# Patient Record
Sex: FEMALE | Race: WHITE | NOT HISPANIC OR LATINO | Employment: OTHER | URBAN - NONMETROPOLITAN AREA
[De-identification: names, ages, dates, MRNs, and addresses within clinical notes are randomized per-mention and may not be internally consistent; named-entity substitution may affect disease eponyms.]

---

## 2022-12-19 ENCOUNTER — DOCUMENTATION (OUTPATIENT)
Dept: FAMILY MEDICINE CLINIC | Facility: CLINIC | Age: 87
End: 2022-12-19

## 2022-12-19 PROBLEM — E83.42 HYPOMAGNESEMIA: Status: ACTIVE | Noted: 2022-12-19

## 2022-12-19 PROBLEM — F41.9 ANXIETY DISORDER: Status: ACTIVE | Noted: 2022-12-19

## 2022-12-19 PROBLEM — D51.0 VITAMIN B12 DEFICIENCY ANEMIA DUE TO INTRINSIC FACTOR DEFICIENCY: Status: ACTIVE | Noted: 2022-12-19

## 2022-12-19 PROBLEM — R14.3 FLATULENCE: Status: ACTIVE | Noted: 2022-12-19

## 2022-12-19 PROBLEM — E11.8 TYPE 2 DIABETES MELLITUS WITH UNSPECIFIED COMPLICATIONS: Status: ACTIVE | Noted: 2022-12-19

## 2022-12-19 PROBLEM — I10 ESSENTIAL (PRIMARY) HYPERTENSION: Status: ACTIVE | Noted: 2022-12-19

## 2022-12-19 PROBLEM — K59.00 CONSTIPATION, UNSPECIFIED: Status: ACTIVE | Noted: 2022-12-19

## 2022-12-19 PROBLEM — G47.00 INSOMNIA, UNSPECIFIED: Status: ACTIVE | Noted: 2022-12-19

## 2022-12-19 PROBLEM — J31.0 CHRONIC RHINITIS: Status: ACTIVE | Noted: 2022-12-19

## 2022-12-19 PROBLEM — E03.8 OTHER SPECIFIED HYPOTHYROIDISM: Status: ACTIVE | Noted: 2022-12-19

## 2022-12-19 PROBLEM — K26.9 DUODENAL ULCER, UNSP AS ACUTE OR CHRONIC, W/O HEMOR OR PERF: Status: ACTIVE | Noted: 2022-12-19

## 2022-12-19 PROBLEM — I21.A9 OTHER MYOCARDIAL INFARCTION TYPE: Status: ACTIVE | Noted: 2022-12-19

## 2022-12-19 PROBLEM — I48.91 UNSPECIFIED ATRIAL FIBRILLATION: Status: ACTIVE | Noted: 2022-12-19

## 2022-12-19 PROBLEM — I20.8 OTHER FORMS OF ANGINA PECTORIS: Status: ACTIVE | Noted: 2022-12-19

## 2022-12-19 PROBLEM — F32.0 MAJOR DEPRESSIVE DISORDER, SINGLE EPISODE, MILD: Status: ACTIVE | Noted: 2022-12-19

## 2022-12-19 PROBLEM — R52 PAIN, UNSPECIFIED: Status: ACTIVE | Noted: 2022-12-19

## 2022-12-19 PROBLEM — K21.9 GERD WITHOUT ESOPHAGITIS: Status: ACTIVE | Noted: 2022-12-19

## 2022-12-19 PROBLEM — E55.9 VITAMIN D DEFICIENCY, UNSPECIFIED: Status: ACTIVE | Noted: 2022-12-19

## 2022-12-19 PROBLEM — B37.9 CANDIDIASIS, UNSPECIFIED: Status: ACTIVE | Noted: 2022-12-19

## 2022-12-19 PROBLEM — J98.01 ACUTE BRONCHOSPASM: Status: ACTIVE | Noted: 2022-12-19

## 2022-12-19 PROBLEM — I20.89 OTHER FORMS OF ANGINA PECTORIS: Status: ACTIVE | Noted: 2022-12-19

## 2022-12-19 RX ORDER — LORATADINE 10 MG/1
10 TABLET ORAL DAILY
COMMUNITY

## 2022-12-19 RX ORDER — POLYETHYLENE GLYCOL 3350 17 G/17G
17 POWDER, FOR SOLUTION ORAL 2 TIMES DAILY
COMMUNITY

## 2022-12-19 RX ORDER — SIMETHICONE 80 MG
80 TABLET,CHEWABLE ORAL EVERY 6 HOURS PRN
COMMUNITY

## 2022-12-19 RX ORDER — LEVOTHYROXINE SODIUM 0.07 MG/1
75 TABLET ORAL DAILY
COMMUNITY

## 2022-12-19 RX ORDER — FLUTICASONE PROPIONATE 50 MCG
1 SPRAY, SUSPENSION (ML) NASAL DAILY
COMMUNITY

## 2022-12-19 RX ORDER — MIRTAZAPINE 15 MG/1
15 TABLET, ORALLY DISINTEGRATING ORAL NIGHTLY
COMMUNITY

## 2022-12-19 RX ORDER — IPRATROPIUM BROMIDE AND ALBUTEROL SULFATE 2.5; .5 MG/3ML; MG/3ML
3 SOLUTION RESPIRATORY (INHALATION) EVERY 6 HOURS PRN
COMMUNITY

## 2022-12-19 RX ORDER — ACETAMINOPHEN 325 MG/1
650 TABLET ORAL EVERY 4 HOURS PRN
COMMUNITY

## 2022-12-19 RX ORDER — NITROGLYCERIN 0.4 MG/1
0.4 TABLET SUBLINGUAL
COMMUNITY

## 2022-12-19 RX ORDER — MELATONIN
1000 DAILY
COMMUNITY

## 2022-12-19 RX ORDER — METOPROLOL SUCCINATE 50 MG/1
50 TABLET, EXTENDED RELEASE ORAL DAILY
COMMUNITY

## 2022-12-19 RX ORDER — HYDROCODONE BITARTRATE AND ACETAMINOPHEN 5; 325 MG/1; MG/1
1 TABLET ORAL EVERY 6 HOURS PRN
COMMUNITY
End: 2022-12-20 | Stop reason: SDUPTHER

## 2022-12-19 RX ORDER — DOCUSATE SODIUM 100 MG/1
100 CAPSULE, LIQUID FILLED ORAL 2 TIMES DAILY
COMMUNITY

## 2022-12-19 RX ORDER — NYSTATIN 100000 U/G
1 OINTMENT TOPICAL 3 TIMES DAILY
COMMUNITY

## 2022-12-19 RX ORDER — PANTOPRAZOLE SODIUM 40 MG/1
40 GRANULE, DELAYED RELEASE ORAL
COMMUNITY

## 2022-12-19 RX ORDER — CHOLECALCIFEROL (VITAMIN D3) 125 MCG
5 CAPSULE ORAL NIGHTLY
COMMUNITY

## 2022-12-19 RX ORDER — ISOSORBIDE MONONITRATE 30 MG/1
60 TABLET, EXTENDED RELEASE ORAL DAILY
COMMUNITY

## 2022-12-19 RX ORDER — CHLORDIAZEPOXIDE HYDROCHLORIDE 5 MG/1
5 CAPSULE, GELATIN COATED ORAL 2 TIMES DAILY
COMMUNITY
End: 2022-12-20 | Stop reason: SDUPTHER

## 2022-12-19 RX ORDER — LORAZEPAM 0.5 MG/1
0.5 TABLET ORAL 4 TIMES DAILY
COMMUNITY
End: 2022-12-20 | Stop reason: SDUPTHER

## 2022-12-19 RX ORDER — MAGNESIUM OXIDE 400 MG/1
400 TABLET ORAL 2 TIMES WEEKLY
COMMUNITY

## 2022-12-19 RX ORDER — INSULIN LISPRO 100 [IU]/ML
INJECTION, SOLUTION INTRAVENOUS; SUBCUTANEOUS
COMMUNITY

## 2022-12-19 RX ORDER — SUCRALFATE 1 G/1
1 TABLET ORAL 4 TIMES DAILY
COMMUNITY

## 2022-12-20 RX ORDER — LORAZEPAM 0.5 MG/1
0.5 TABLET ORAL EVERY 4 HOURS
Qty: 180 TABLET | Refills: 3 | Status: SHIPPED | OUTPATIENT
Start: 2022-12-20 | End: 2023-04-03 | Stop reason: SDUPTHER

## 2022-12-20 RX ORDER — CHLORDIAZEPOXIDE HYDROCHLORIDE 5 MG/1
5 CAPSULE, GELATIN COATED ORAL 2 TIMES DAILY
Qty: 60 CAPSULE | Refills: 3 | Status: SHIPPED | OUTPATIENT
Start: 2022-12-20

## 2022-12-20 RX ORDER — HYDROCODONE BITARTRATE AND ACETAMINOPHEN 5; 325 MG/1; MG/1
1 TABLET ORAL EVERY 6 HOURS PRN
Qty: 120 TABLET | Refills: 0 | Status: SHIPPED | OUTPATIENT
Start: 2022-12-20 | End: 2023-03-03 | Stop reason: SDUPTHER

## 2022-12-20 NOTE — TELEPHONE ENCOUNTER
(NEW ADMIT)    [facility] REQUESTING MED REFILLS FOR LORAZEPAM 0.5 MG, NORCO 5-325 MG AND CHLORIDIAZEPOXIDE 5 MG.    DIRECTIONS: LORAZEPAM 0.5 MG 1 TAB PO Q 4 HRS SCHEDULED.    NORCO 5-325 MG 1 TAB PO Q 6 HRS PRN.    CHLORDIAZEPOXIDE 5 MG T TAB PO BID SCHEDULED.

## 2022-12-21 ENCOUNTER — NURSING HOME (OUTPATIENT)
Dept: INTERNAL MEDICINE | Facility: CLINIC | Age: 87
End: 2022-12-21
Payer: MEDICARE

## 2022-12-21 VITALS
RESPIRATION RATE: 18 BRPM | TEMPERATURE: 98.3 F | DIASTOLIC BLOOD PRESSURE: 72 MMHG | HEART RATE: 64 BPM | OXYGEN SATURATION: 96 % | WEIGHT: 167.19 LBS | SYSTOLIC BLOOD PRESSURE: 140 MMHG

## 2022-12-21 DIAGNOSIS — I21.4 NON-STEMI (NON-ST ELEVATED MYOCARDIAL INFARCTION): Primary | ICD-10-CM

## 2022-12-21 DIAGNOSIS — E03.9 HYPOTHYROIDISM, UNSPECIFIED TYPE: ICD-10-CM

## 2022-12-21 DIAGNOSIS — F32.A ANXIETY AND DEPRESSION: ICD-10-CM

## 2022-12-21 DIAGNOSIS — I48.11 LONGSTANDING PERSISTENT ATRIAL FIBRILLATION: ICD-10-CM

## 2022-12-21 DIAGNOSIS — E11.65 TYPE 2 DIABETES MELLITUS WITH HYPERGLYCEMIA, UNSPECIFIED WHETHER LONG TERM INSULIN USE: ICD-10-CM

## 2022-12-21 DIAGNOSIS — I50.20 SYSTOLIC CONGESTIVE HEART FAILURE, UNSPECIFIED HF CHRONICITY: ICD-10-CM

## 2022-12-21 DIAGNOSIS — F41.9 ANXIETY AND DEPRESSION: ICD-10-CM

## 2022-12-21 DIAGNOSIS — I10 ESSENTIAL (PRIMARY) HYPERTENSION: ICD-10-CM

## 2022-12-21 PROCEDURE — 99305 1ST NF CARE MODERATE MDM 35: CPT | Performed by: INTERNAL MEDICINE

## 2022-12-21 NOTE — LETTER
NURSING HOME HISTORY AND PHYSICAL       Radha Hopper MD  [address] [place] [address]  Phone: [phone]  Fax: [phone]       PATIENT NAME: Breonna Gongora                                                                          YOB: 1935           DATE OF SERVICE: 12/21/2022  FACILITY:  [facility]  ______________________________________________________________________    CHIEF COMPLAINT:  Initial visit, status post non-STEMI    SUBJECTIVE     HISTORY OF PRESENT ILLNESS:   Mrs. Gongora is an 87 years old female with history of type 2 diabetes mellitus, essential hypertension, congestive heart failure, atrial fibrillation and hypothyroidism. She was recently hospitalized at our [facility] with a diagnosis of pneumonia and non-STEMI.  Cardiac catheterization revealed multivessel disease with severe plaquing, as further management options presented in the form of multivessel PCI versus shifting focus to comfort measures via hospice services, family elected to proceed with the latter.  Per available records, patient was subsequently transferred to swing bed placement where she received PT and OT while she enjoyed a stable clinical course.  However, she apparently was unable to participate in recommended therapy, therefore she was subsequently transferred to our facility for consideration of long-term placement.  Since transfer, patient remained in stable condition with no reported fever, chills or other acute systemic issues.  During my visit, patient was calm, comfortable and pleasantly denied complaints.    PAST MEDICAL & SURGICAL HISTORY:   Past Medical History:   Diagnosis Date   • Allergic    • Anemia    • CHF (congestive heart failure) (HCC)    • Depression    • Hypothyroidism    • Stroke (HCC)       Past Surgical History:   Procedure Laterality Date   • APPENDECTOMY     • CATARACT EXTRACTION N/A    • TOTAL ABDOMINAL HYSTERECTOMY WITH SALPINGO  OOPHORECTOMY           MEDICATIONS:  I have reviewed and reconciled the patients medication list in the patients chart at the skilled nursing facility today.      ALLERGIES:  Allergies   Allergen Reactions   • Penicillins Unknown - High Severity   • Sulfa Antibiotics Unknown - High Severity         SOCIAL HISTORY:  Social History     Socioeconomic History   • Marital status: Unknown   Tobacco Use   • Smoking status: Never   • Smokeless tobacco: Never   Substance and Sexual Activity   • Alcohol use: Never   • Drug use: Never   • Sexual activity: Defer       FAMILY HISTORY:  Family History   Problem Relation Age of Onset   • COPD Mother    • Heart disease Father    • Alcohol abuse Father        REVIEW OF SYSTEMS:  Fatigue, limited mobility, deconditioning  All other systems were reviewed and are negative.    OBJECTIVE     PHYSICAL EXAMINATION:   /72   Pulse 64   Temp 98.3 °F (36.8 °C)   Resp 18   Wt 75.8 kg (167 lb 3 oz)   SpO2 96%     Physical Exam    General Appearance:  Patient appears chronically ill, no signs of distress noted.   Lungs:   Breath sounds are equal bilaterally. No crackles or wheezing noted.   Heart:  Normal S1 and S2, no murmur, no gallop, no rub. No JVD.   Abdomen:   Soft, nontender, non distended, no guarding or rebound tenderness.  Bowel sounds are normal, no organomegaly.     Extremities: No edema, cyanosis or clubbing.     Musculoskeletal: Deconditioning noted.     Skin: No wounds or rash noted.     Neurologic:  Psychiatric:                           Grossly non focal.   Calm affect.       RECORDS REVIEW:   I have reviewed available medical records.  December 20, 2022: Glucose 195, sodium 137, potassium 4.4, BUN/creatinine 13/0.7, LFTs within normal, hemoglobin A1c 7.4, WBCs 9.9, H&H 10.2/31, platelets 213, TSH 1.94    ASSESSMENT AND PLAN     ASSESSMENT AND PLAN:  Diagnoses and all orders for this visit:    1. Non-STEMI (non-ST elevated myocardial infarction) (HCC)  (Primary)  Comments:  Stable at present with no signs of ischemia, continue secondary prevention, Plavix, isosorbide and nitroglycerin as needed    2. Type 2 diabetes mellitus with hyperglycemia, unspecified whether long term insulin use (HCC)  Comments:  Blood glucose controlled at present, continue Tradjenta and correction dose coverage    3. Longstanding persistent atrial fibrillation (HCC)  Comments:  Clinically, rate is controlled at present on metoprolol, continue anticoagulation in the form of Eliquis    4. Essential (primary) hypertension  Comments:  Controlled on metoprolol and lisinopril, continue the same and monitor closely    5. Systolic congestive heart failure, unspecified HF chronicity (HCC)  Comments:  Stable at present, no signs of decompensation, of note records are unavailable in this regard    6. Anxiety and depression  Comments:  Continue mirtazapine and lorazepam, recommend psych services follow-up    7. Hypothyroidism, unspecified type  Comments:  Continue Synthroid, TSH is at therapeutic level, monitor and modify accordingly        GENERAL MANAGEMENT:    [x]  Plan of Care Reviewed   [x]  Aspiration and fall precautions  [x]  Nutritional support and hydration  [x]  Bowel hygiene  [x]  Skin and wound care, strict offloading and pressure relief  [x]  PT/OT Reviewed   [x]  Blood glucose monitoring, hypoglycemic precautions, correction dose coverage as ordered if applicable  [x]  GDR if/as clinically indicated  [x]  Consultant follow up as clinically indicated  [x]  Code Status: DO NOT RESUSCITATE, DO NOT INTUBATE  [x]  Discussed in detail with nursing/staff, addressed all needs today              Radha Hopper MD.

## 2023-01-01 ENCOUNTER — NURSING HOME (OUTPATIENT)
Dept: FAMILY MEDICINE CLINIC | Facility: CLINIC | Age: 88
End: 2023-01-01
Payer: MEDICARE

## 2023-01-01 ENCOUNTER — APPOINTMENT (OUTPATIENT)
Dept: GENERAL RADIOLOGY | Facility: HOSPITAL | Age: 88
DRG: 951 | End: 2023-01-01
Payer: MEDICARE

## 2023-01-01 ENCOUNTER — HOSPITAL ENCOUNTER (INPATIENT)
Facility: HOSPITAL | Age: 88
LOS: 1 days | DRG: 951 | End: 2023-11-30
Attending: EMERGENCY MEDICINE | Admitting: INTERNAL MEDICINE
Payer: MEDICARE

## 2023-01-01 VITALS
SYSTOLIC BLOOD PRESSURE: 103 MMHG | HEART RATE: 104 BPM | WEIGHT: 160 LBS | TEMPERATURE: 98 F | BODY MASS INDEX: 31.41 KG/M2 | RESPIRATION RATE: 38 BRPM | OXYGEN SATURATION: 92 % | DIASTOLIC BLOOD PRESSURE: 61 MMHG | HEIGHT: 60 IN

## 2023-01-01 VITALS
BODY MASS INDEX: 31.33 KG/M2 | DIASTOLIC BLOOD PRESSURE: 60 MMHG | TEMPERATURE: 97.5 F | HEART RATE: 82 BPM | SYSTOLIC BLOOD PRESSURE: 164 MMHG | RESPIRATION RATE: 18 BRPM | OXYGEN SATURATION: 97 % | WEIGHT: 160.4 LBS

## 2023-01-01 DIAGNOSIS — R65.20 SEPSIS WITH ACUTE ORGAN DYSFUNCTION, DUE TO UNSPECIFIED ORGANISM, UNSPECIFIED ORGAN DYSFUNCTION TYPE, UNSPECIFIED WHETHER SEPTIC SHOCK PRESENT: ICD-10-CM

## 2023-01-01 DIAGNOSIS — R05.1 ACUTE COUGH: ICD-10-CM

## 2023-01-01 DIAGNOSIS — B33.8 RSV (RESPIRATORY SYNCYTIAL VIRUS INFECTION): ICD-10-CM

## 2023-01-01 DIAGNOSIS — I50.9 ACUTE CONGESTIVE HEART FAILURE, UNSPECIFIED HEART FAILURE TYPE: ICD-10-CM

## 2023-01-01 DIAGNOSIS — Z78.9 IMPAIRED MOBILITY AND ADLS: ICD-10-CM

## 2023-01-01 DIAGNOSIS — J96.02 ACUTE RESPIRATORY FAILURE WITH HYPOXIA AND HYPERCAPNIA: Primary | ICD-10-CM

## 2023-01-01 DIAGNOSIS — I48.91 ATRIAL FIBRILLATION WITH RVR: ICD-10-CM

## 2023-01-01 DIAGNOSIS — J96.01 ACUTE RESPIRATORY FAILURE WITH HYPOXIA AND HYPERCAPNIA: Primary | ICD-10-CM

## 2023-01-01 DIAGNOSIS — R06.2 WHEEZING: ICD-10-CM

## 2023-01-01 DIAGNOSIS — I50.9 CHRONIC CONGESTIVE HEART FAILURE, UNSPECIFIED HEART FAILURE TYPE: ICD-10-CM

## 2023-01-01 DIAGNOSIS — A41.9 SEPSIS WITH ACUTE ORGAN DYSFUNCTION, DUE TO UNSPECIFIED ORGANISM, UNSPECIFIED ORGAN DYSFUNCTION TYPE, UNSPECIFIED WHETHER SEPTIC SHOCK PRESENT: ICD-10-CM

## 2023-01-01 DIAGNOSIS — Z74.09 IMPAIRED MOBILITY AND ADLS: ICD-10-CM

## 2023-01-01 DIAGNOSIS — J42 CHRONIC BRONCHITIS, UNSPECIFIED CHRONIC BRONCHITIS TYPE: ICD-10-CM

## 2023-01-01 LAB
A-A DO2: 176.6 MMHG
ALBUMIN SERPL-MCNC: 3.5 G/DL (ref 3.5–5.2)
ALBUMIN/GLOB SERPL: 0.7 G/DL
ALP SERPL-CCNC: 102 U/L (ref 39–117)
ALT SERPL W P-5'-P-CCNC: 12 U/L (ref 1–33)
ANION GAP SERPL CALCULATED.3IONS-SCNC: 11.1 MMOL/L (ref 5–15)
ARTERIAL PATENCY WRIST A: POSITIVE
AST SERPL-CCNC: 23 U/L (ref 1–32)
ATMOSPHERIC PRESS: 736 MMHG
B PARAPERT DNA SPEC QL NAA+PROBE: NOT DETECTED
B PERT DNA SPEC QL NAA+PROBE: NOT DETECTED
BASE EXCESS BLDA CALC-SCNC: 6.6 MMOL/L (ref 0–2)
BASOPHILS # BLD AUTO: 0.14 10*3/MM3 (ref 0–0.2)
BASOPHILS NFR BLD AUTO: 0.5 % (ref 0–1.5)
BDY SITE: ABNORMAL
BILIRUB SERPL-MCNC: 0.2 MG/DL (ref 0–1.2)
BUN SERPL-MCNC: 38 MG/DL (ref 8–23)
BUN/CREAT SERPL: 36.2 (ref 7–25)
C PNEUM DNA NPH QL NAA+NON-PROBE: NOT DETECTED
CALCIUM SPEC-SCNC: 9.7 MG/DL (ref 8.6–10.5)
CHLORIDE SERPL-SCNC: 94 MMOL/L (ref 98–107)
CO2 SERPL-SCNC: 31.9 MMOL/L (ref 22–29)
COHGB MFR BLD: 1.2 % (ref 0–2)
CREAT SERPL-MCNC: 1.05 MG/DL (ref 0.57–1)
D-LACTATE SERPL-SCNC: 3 MMOL/L (ref 0.5–2)
DEPRECATED RDW RBC AUTO: 47 FL (ref 37–54)
EGFRCR SERPLBLD CKD-EPI 2021: 51.2 ML/MIN/1.73
EOSINOPHIL # BLD AUTO: 0.04 10*3/MM3 (ref 0–0.4)
EOSINOPHIL NFR BLD AUTO: 0.1 % (ref 0.3–6.2)
ERYTHROCYTE [DISTWIDTH] IN BLOOD BY AUTOMATED COUNT: 13.9 % (ref 12.3–15.4)
FLUAV SUBTYP SPEC NAA+PROBE: NOT DETECTED
FLUBV RNA ISLT QL NAA+PROBE: NOT DETECTED
GLOBULIN UR ELPH-MCNC: 5.2 GM/DL
GLUCOSE SERPL-MCNC: 160 MG/DL (ref 65–99)
HADV DNA SPEC NAA+PROBE: NOT DETECTED
HCO3 BLDA-SCNC: 34.9 MMOL/L (ref 22–28)
HCOV 229E RNA SPEC QL NAA+PROBE: NOT DETECTED
HCOV HKU1 RNA SPEC QL NAA+PROBE: NOT DETECTED
HCOV NL63 RNA SPEC QL NAA+PROBE: NOT DETECTED
HCOV OC43 RNA SPEC QL NAA+PROBE: NOT DETECTED
HCT VFR BLD AUTO: 38.2 % (ref 34–46.6)
HCT VFR BLD CALC: 37.8 %
HGB BLD-MCNC: 11.9 G/DL (ref 12–15.9)
HMPV RNA NPH QL NAA+NON-PROBE: NOT DETECTED
HOLD SPECIMEN: NORMAL
HOLD SPECIMEN: NORMAL
HPIV1 RNA ISLT QL NAA+PROBE: NOT DETECTED
HPIV2 RNA SPEC QL NAA+PROBE: NOT DETECTED
HPIV3 RNA NPH QL NAA+PROBE: NOT DETECTED
HPIV4 P GENE NPH QL NAA+PROBE: NOT DETECTED
IMM GRANULOCYTES # BLD AUTO: 0.15 10*3/MM3 (ref 0–0.05)
IMM GRANULOCYTES NFR BLD AUTO: 0.5 % (ref 0–0.5)
INHALED O2 CONCENTRATION: 50 %
LYMPHOCYTES # BLD AUTO: 1.45 10*3/MM3 (ref 0.7–3.1)
LYMPHOCYTES NFR BLD AUTO: 5 % (ref 19.6–45.3)
Lab: ABNORMAL
Lab: ABNORMAL
M PNEUMO IGG SER IA-ACNC: NOT DETECTED
MCH RBC QN AUTO: 28.9 PG (ref 26.6–33)
MCHC RBC AUTO-ENTMCNC: 31.2 G/DL (ref 31.5–35.7)
MCV RBC AUTO: 92.7 FL (ref 79–97)
METHGB BLD QL: 0.2 % (ref 0–1.5)
MODALITY: ABNORMAL
MONOCYTES # BLD AUTO: 1.76 10*3/MM3 (ref 0.1–0.9)
MONOCYTES NFR BLD AUTO: 6.1 % (ref 5–12)
NEUTROPHILS NFR BLD AUTO: 25.2 10*3/MM3 (ref 1.7–7)
NEUTROPHILS NFR BLD AUTO: 87.8 % (ref 42.7–76)
NOTIFIED BY: ABNORMAL
NOTIFIED WHO: ABNORMAL
NRBC BLD AUTO-RTO: 0 /100 WBC (ref 0–0.2)
NT-PROBNP SERPL-MCNC: ABNORMAL PG/ML (ref 0–1800)
OXYHGB MFR BLDV: 96.3 % (ref 94–99)
PCO2 BLDA: 68.3 MM HG (ref 35–45)
PCO2 TEMP ADJ BLD: ABNORMAL MM[HG]
PH BLDA: 7.32 PH UNITS (ref 7.35–7.45)
PH, TEMP CORRECTED: ABNORMAL
PLATELET # BLD AUTO: 413 10*3/MM3 (ref 140–450)
PMV BLD AUTO: 12.2 FL (ref 6–12)
PO2 BLDA: 93.9 MM HG (ref 75–100)
PO2 TEMP ADJ BLD: ABNORMAL MM[HG]
POTASSIUM SERPL-SCNC: 3.9 MMOL/L (ref 3.5–5.2)
PROCALCITONIN SERPL-MCNC: 0.14 NG/ML (ref 0–0.25)
PROT SERPL-MCNC: 8.7 G/DL (ref 6–8.5)
RBC # BLD AUTO: 4.12 10*6/MM3 (ref 3.77–5.28)
RHINOVIRUS RNA SPEC NAA+PROBE: NOT DETECTED
RSV RNA NPH QL NAA+NON-PROBE: DETECTED
SAO2 % BLDCOA: 97.6 % (ref 94–100)
SARS-COV-2 RNA NPH QL NAA+NON-PROBE: NOT DETECTED
SODIUM SERPL-SCNC: 137 MMOL/L (ref 136–145)
TROPONIN T SERPL HS-MCNC: 39 NG/L
VENTILATOR MODE: ABNORMAL
WBC NRBC COR # BLD AUTO: 28.74 10*3/MM3 (ref 3.4–10.8)
WHOLE BLOOD HOLD COAG: NORMAL
WHOLE BLOOD HOLD SPECIMEN: NORMAL

## 2023-01-01 PROCEDURE — 84484 ASSAY OF TROPONIN QUANT: CPT | Performed by: EMERGENCY MEDICINE

## 2023-01-01 PROCEDURE — 84145 PROCALCITONIN (PCT): CPT | Performed by: EMERGENCY MEDICINE

## 2023-01-01 PROCEDURE — 25010000002 LEVOFLOXACIN PER 250 MG: Performed by: EMERGENCY MEDICINE

## 2023-01-01 PROCEDURE — 71045 X-RAY EXAM CHEST 1 VIEW: CPT

## 2023-01-01 PROCEDURE — 25010000002 MORPHINE PER 10 MG: Performed by: EMERGENCY MEDICINE

## 2023-01-01 PROCEDURE — 99238 HOSP IP/OBS DSCHRG MGMT 30/<: CPT | Performed by: NURSE PRACTITIONER

## 2023-01-01 PROCEDURE — 93005 ELECTROCARDIOGRAM TRACING: CPT | Performed by: EMERGENCY MEDICINE

## 2023-01-01 PROCEDURE — 83050 HGB METHEMOGLOBIN QUAN: CPT

## 2023-01-01 PROCEDURE — 83880 ASSAY OF NATRIURETIC PEPTIDE: CPT | Performed by: EMERGENCY MEDICINE

## 2023-01-01 PROCEDURE — 82805 BLOOD GASES W/O2 SATURATION: CPT

## 2023-01-01 PROCEDURE — 80053 COMPREHEN METABOLIC PANEL: CPT | Performed by: EMERGENCY MEDICINE

## 2023-01-01 PROCEDURE — 94660 CPAP INITIATION&MGMT: CPT

## 2023-01-01 PROCEDURE — 0202U NFCT DS 22 TRGT SARS-COV-2: CPT | Performed by: EMERGENCY MEDICINE

## 2023-01-01 PROCEDURE — 83605 ASSAY OF LACTIC ACID: CPT | Performed by: EMERGENCY MEDICINE

## 2023-01-01 PROCEDURE — 36600 WITHDRAWAL OF ARTERIAL BLOOD: CPT

## 2023-01-01 PROCEDURE — 94799 UNLISTED PULMONARY SVC/PX: CPT

## 2023-01-01 PROCEDURE — 5A09357 ASSISTANCE WITH RESPIRATORY VENTILATION, LESS THAN 24 CONSECUTIVE HOURS, CONTINUOUS POSITIVE AIRWAY PRESSURE: ICD-10-PCS | Performed by: FAMILY MEDICINE

## 2023-01-01 PROCEDURE — 85025 COMPLETE CBC W/AUTO DIFF WBC: CPT | Performed by: EMERGENCY MEDICINE

## 2023-01-01 PROCEDURE — 36415 COLL VENOUS BLD VENIPUNCTURE: CPT

## 2023-01-01 PROCEDURE — 99285 EMERGENCY DEPT VISIT HI MDM: CPT

## 2023-01-01 PROCEDURE — 25010000002 FUROSEMIDE PER 20 MG: Performed by: EMERGENCY MEDICINE

## 2023-01-01 PROCEDURE — 82375 ASSAY CARBOXYHB QUANT: CPT

## 2023-01-01 PROCEDURE — 87040 BLOOD CULTURE FOR BACTERIA: CPT | Performed by: EMERGENCY MEDICINE

## 2023-01-01 RX ORDER — LEVOFLOXACIN 5 MG/ML
750 INJECTION, SOLUTION INTRAVENOUS ONCE
Status: COMPLETED | OUTPATIENT
Start: 2023-01-01 | End: 2023-01-01

## 2023-01-01 RX ORDER — SODIUM CHLORIDE 0.9 % (FLUSH) 0.9 %
10 SYRINGE (ML) INJECTION EVERY 12 HOURS SCHEDULED
Status: CANCELLED | OUTPATIENT
Start: 2023-01-01

## 2023-01-01 RX ORDER — SODIUM CHLORIDE 0.9 % (FLUSH) 0.9 %
10 SYRINGE (ML) INJECTION AS NEEDED
Status: CANCELLED | OUTPATIENT
Start: 2023-01-01

## 2023-01-01 RX ORDER — MORPHINE SULFATE 2 MG/ML
2 INJECTION, SOLUTION INTRAMUSCULAR; INTRAVENOUS ONCE
Status: COMPLETED | OUTPATIENT
Start: 2023-01-01 | End: 2023-01-01

## 2023-01-01 RX ORDER — MORPHINE SULFATE 2 MG/ML
2 INJECTION, SOLUTION INTRAMUSCULAR; INTRAVENOUS EVERY 4 HOURS PRN
Status: CANCELLED | OUTPATIENT
Start: 2023-01-01 | End: 2023-12-07

## 2023-01-01 RX ORDER — METOPROLOL SUCCINATE 25 MG/1
50 TABLET, EXTENDED RELEASE ORAL DAILY
Status: CANCELLED | OUTPATIENT
Start: 2023-01-01

## 2023-01-01 RX ORDER — FUROSEMIDE 10 MG/ML
80 INJECTION INTRAMUSCULAR; INTRAVENOUS ONCE
Status: COMPLETED | OUTPATIENT
Start: 2023-01-01 | End: 2023-01-01

## 2023-01-01 RX ORDER — ONDANSETRON 2 MG/ML
4 INJECTION INTRAMUSCULAR; INTRAVENOUS EVERY 6 HOURS PRN
Status: CANCELLED | OUTPATIENT
Start: 2023-01-01

## 2023-01-01 RX ORDER — ONDANSETRON 4 MG/1
4 TABLET, FILM COATED ORAL EVERY 6 HOURS PRN
Status: CANCELLED | OUTPATIENT
Start: 2023-01-01

## 2023-01-01 RX ORDER — SODIUM CHLORIDE 0.9 % (FLUSH) 0.9 %
10 SYRINGE (ML) INJECTION AS NEEDED
Status: DISCONTINUED | OUTPATIENT
Start: 2023-01-01 | End: 2023-01-01 | Stop reason: HOSPADM

## 2023-01-01 RX ORDER — LORAZEPAM 2 MG/ML
0.5 INJECTION INTRAMUSCULAR EVERY 4 HOURS PRN
Status: CANCELLED | OUTPATIENT
Start: 2023-01-01 | End: 2023-12-07

## 2023-01-01 RX ORDER — IPRATROPIUM BROMIDE AND ALBUTEROL SULFATE 2.5; .5 MG/3ML; MG/3ML
3 SOLUTION RESPIRATORY (INHALATION) EVERY 6 HOURS PRN
Status: CANCELLED | OUTPATIENT
Start: 2023-01-01

## 2023-01-01 RX ADMIN — MORPHINE SULFATE 2 MG: 2 INJECTION, SOLUTION INTRAMUSCULAR; INTRAVENOUS at 06:46

## 2023-01-01 RX ADMIN — LEVOFLOXACIN 750 MG: 5 INJECTION, SOLUTION INTRAVENOUS at 02:52

## 2023-01-01 RX ADMIN — MORPHINE SULFATE 2 MG: 2 INJECTION, SOLUTION INTRAMUSCULAR; INTRAVENOUS at 03:23

## 2023-01-01 RX ADMIN — FUROSEMIDE 80 MG: 10 INJECTION, SOLUTION INTRAMUSCULAR; INTRAVENOUS at 03:23

## 2023-01-07 NOTE — PROGRESS NOTES
NURSING HOME HISTORY AND PHYSICAL       Radha Hopper MD  [address] [place] [address]  Phone: [phone]  Fax: [phone]       PATIENT NAME: Breonna Gongora                                                                          YOB: 1935           DATE OF SERVICE: 12/21/2022  FACILITY:  [facility]  ______________________________________________________________________    CHIEF COMPLAINT:  Initial visit, status post non-STEMI    SUBJECTIVE     HISTORY OF PRESENT ILLNESS:   Mrs. Gongora is an 87 years old female with history of type 2 diabetes mellitus, essential hypertension, congestive heart failure, atrial fibrillation and hypothyroidism. She was recently hospitalized at our [facility] with a diagnosis of pneumonia and non-STEMI.  Cardiac catheterization revealed multivessel disease with severe plaquing, as further management options presented in the form of multivessel PCI versus shifting focus to comfort measures via hospice services, family elected to proceed with the latter.  Per available records, patient was subsequently transferred to swing bed placement where she received PT and OT while she enjoyed a stable clinical course.  However, she apparently was unable to participate in recommended therapy, therefore she was subsequently transferred to our facility for consideration of long-term placement.  Since transfer, patient remained in stable condition with no reported fever, chills or other acute systemic issues.  During my visit, patient was calm, comfortable and pleasantly denied complaints.    PAST MEDICAL & SURGICAL HISTORY:   Past Medical History:   Diagnosis Date   • Allergic    • Anemia    • CHF (congestive heart failure) (HCC)    • Depression    • Hypothyroidism    • Stroke (HCC)       Past Surgical History:   Procedure Laterality Date   • APPENDECTOMY     • CATARACT EXTRACTION N/A    • TOTAL ABDOMINAL HYSTERECTOMY WITH SALPINGO  OOPHORECTOMY           MEDICATIONS:  I have reviewed and reconciled the patients medication list in the patients chart at the skilled nursing facility today.      ALLERGIES:  Allergies   Allergen Reactions   • Penicillins Unknown - High Severity   • Sulfa Antibiotics Unknown - High Severity         SOCIAL HISTORY:  Social History     Socioeconomic History   • Marital status: Unknown   Tobacco Use   • Smoking status: Never   • Smokeless tobacco: Never   Substance and Sexual Activity   • Alcohol use: Never   • Drug use: Never   • Sexual activity: Defer       FAMILY HISTORY:  Family History   Problem Relation Age of Onset   • COPD Mother    • Heart disease Father    • Alcohol abuse Father        REVIEW OF SYSTEMS:  Fatigue, limited mobility, deconditioning  All other systems were reviewed and are negative.    OBJECTIVE     PHYSICAL EXAMINATION:   /72   Pulse 64   Temp 98.3 °F (36.8 °C)   Resp 18   Wt 75.8 kg (167 lb 3 oz)   SpO2 96%     Physical Exam    General Appearance:  Patient appears chronically ill, no signs of distress noted.   Lungs:   Breath sounds are equal bilaterally. No crackles or wheezing noted.   Heart:  Normal S1 and S2, no murmur, no gallop, no rub. No JVD.   Abdomen:   Soft, nontender, non distended, no guarding or rebound tenderness.  Bowel sounds are normal, no organomegaly.     Extremities: No edema, cyanosis or clubbing.     Musculoskeletal: Deconditioning noted.     Skin: No wounds or rash noted.     Neurologic:  Psychiatric:                           Grossly non focal.   Calm affect.       RECORDS REVIEW:   I have reviewed available medical records.  December 20, 2022: Glucose 195, sodium 137, potassium 4.4, BUN/creatinine 13/0.7, LFTs within normal, hemoglobin A1c 7.4, WBCs 9.9, H&H 10.2/31, platelets 213, TSH 1.94    ASSESSMENT AND PLAN     ASSESSMENT AND PLAN:  Diagnoses and all orders for this visit:    1. Non-STEMI (non-ST elevated myocardial infarction) (HCC)  (Primary)  Comments:  Stable at present with no signs of ischemia, continue secondary prevention, Plavix, isosorbide and nitroglycerin as needed    2. Type 2 diabetes mellitus with hyperglycemia, unspecified whether long term insulin use (HCC)  Comments:  Blood glucose controlled at present, continue Tradjenta and correction dose coverage    3. Longstanding persistent atrial fibrillation (HCC)  Comments:  Clinically, rate is controlled at present on metoprolol, continue anticoagulation in the form of Eliquis    4. Essential (primary) hypertension  Comments:  Controlled on metoprolol and lisinopril, continue the same and monitor closely    5. Systolic congestive heart failure, unspecified HF chronicity (HCC)  Comments:  Stable at present, no signs of decompensation, of note records are unavailable in this regard    6. Anxiety and depression  Comments:  Continue mirtazapine and lorazepam, recommend psych services follow-up    7. Hypothyroidism, unspecified type  Comments:  Continue Synthroid, TSH is at therapeutic level, monitor and modify accordingly        GENERAL MANAGEMENT:    [x]  Plan of Care Reviewed   [x]  Aspiration and fall precautions  [x]  Nutritional support and hydration  [x]  Bowel hygiene  [x]  Skin and wound care, strict offloading and pressure relief  [x]  PT/OT Reviewed   [x]  Blood glucose monitoring, hypoglycemic precautions, correction dose coverage as ordered if applicable  [x]  GDR if/as clinically indicated  [x]  Consultant follow up as clinically indicated  [x]  Code Status: DO NOT RESUSCITATE, DO NOT INTUBATE  [x]  Discussed in detail with nursing/staff, addressed all needs today              Radha Hopper MD.

## 2023-01-30 ENCOUNTER — NURSING HOME (OUTPATIENT)
Dept: FAMILY MEDICINE CLINIC | Facility: CLINIC | Age: 88
End: 2023-01-30
Payer: MEDICARE

## 2023-01-30 DIAGNOSIS — H91.93 DECREASED HEARING OF BOTH EARS: ICD-10-CM

## 2023-01-30 DIAGNOSIS — G47.00 INSOMNIA, UNSPECIFIED TYPE: Primary | ICD-10-CM

## 2023-01-30 DIAGNOSIS — F41.1 GENERALIZED ANXIETY DISORDER: ICD-10-CM

## 2023-01-30 DIAGNOSIS — F32.0 MAJOR DEPRESSIVE DISORDER, SINGLE EPISODE, MILD: ICD-10-CM

## 2023-01-30 PROCEDURE — 99309 SBSQ NF CARE MODERATE MDM 30: CPT | Performed by: NURSE PRACTITIONER

## 2023-01-31 VITALS
RESPIRATION RATE: 17 BRPM | DIASTOLIC BLOOD PRESSURE: 55 MMHG | SYSTOLIC BLOOD PRESSURE: 170 MMHG | OXYGEN SATURATION: 96 % | TEMPERATURE: 97.6 F | WEIGHT: 159.2 LBS | HEART RATE: 74 BPM

## 2023-02-16 ENCOUNTER — NURSING HOME (OUTPATIENT)
Dept: INTERNAL MEDICINE | Facility: CLINIC | Age: 88
End: 2023-02-16
Payer: MEDICARE

## 2023-02-16 VITALS
TEMPERATURE: 97.9 F | HEART RATE: 68 BPM | DIASTOLIC BLOOD PRESSURE: 83 MMHG | RESPIRATION RATE: 16 BRPM | SYSTOLIC BLOOD PRESSURE: 187 MMHG | OXYGEN SATURATION: 97 %

## 2023-02-16 DIAGNOSIS — E11.65 TYPE 2 DIABETES MELLITUS WITH HYPERGLYCEMIA, WITH LONG-TERM CURRENT USE OF INSULIN: Primary | ICD-10-CM

## 2023-02-16 DIAGNOSIS — Z79.4 TYPE 2 DIABETES MELLITUS WITH HYPERGLYCEMIA, WITH LONG-TERM CURRENT USE OF INSULIN: Primary | ICD-10-CM

## 2023-02-16 DIAGNOSIS — F32.A ANXIETY AND DEPRESSION: ICD-10-CM

## 2023-02-16 DIAGNOSIS — I10 ESSENTIAL HYPERTENSION: ICD-10-CM

## 2023-02-16 DIAGNOSIS — I25.10 CORONARY ARTERY DISEASE INVOLVING NATIVE HEART WITHOUT ANGINA PECTORIS, UNSPECIFIED VESSEL OR LESION TYPE: ICD-10-CM

## 2023-02-16 DIAGNOSIS — F41.9 ANXIETY AND DEPRESSION: ICD-10-CM

## 2023-02-16 PROCEDURE — 99309 SBSQ NF CARE MODERATE MDM 30: CPT | Performed by: INTERNAL MEDICINE

## 2023-02-16 NOTE — LETTER
NURSING HOME PROGRESS NOTE       Radha Hopper MD  [address] [place] [address]  Phone: [phone]  Fax: [phone]       PATIENT NAME: Breonna Gongora                                                                          YOB: 1935           DATE OF SERVICE: 2/16/2023  FACILITY:  [facility]  ______________________________________________________________________    CHIEF COMPLAINT:  Follow-up visit, history of acute MI    SUBJECTIVE     HISTORY OF PRESENT ILLNESS:   Mrs. Gongora is an 87 years old female with history of type 2 diabetes mellitus, essential hypertension, congestive heart failure, atrial fibrillation and hypothyroidism. She was recently hospitalized at our [facility] with a diagnosis of pneumonia and non-STEMI.  Cardiac catheterization revealed multivessel disease with severe plaquing, as further management options presented in the form of multivessel PCI versus shifting focus to comfort measures via hospice services, family elected to proceed with the latter.  Per available records, patient was subsequently transferred to swing bed placement where she received PT and OT while she enjoyed a stable clinical course.  However, she apparently was unable to participate in recommended therapy, therefore she was subsequently transferred to our facility for consideration of long-term placement.      As reported by staff and regular medical oversight, since transfer, patient remained in stable condition with no reported fever, chills or other acute systemic issues; weight has been stable; patient continues to be on Zoloft, lorazepam and Librium for depression and anxiety with no adverse effects, psych services are following.      During my visit, patient was calm, comfortable, also hard of hearing, she communicated to me [feeling miserable] due to dysuria.  Above was communicated to staff with orders for UA and culture and sensitivity, family  will be informed regarding the same.    PAST MEDICAL & SURGICAL HISTORY:   Past Medical History:   Diagnosis Date    Allergic     Anemia     CHF (congestive heart failure)     Depression     Hypothyroidism     Paroxysmal A-fib     Stroke       Past Surgical History:   Procedure Laterality Date    APPENDECTOMY      CATARACT EXTRACTION N/A     TOTAL ABDOMINAL HYSTERECTOMY WITH SALPINGO OOPHORECTOMY           MEDICATIONS:  I have reviewed and reconciled the patients medication list in the patients chart at the skilled nursing facility today.      ALLERGIES:  Allergies   Allergen Reactions    Penicillins Unknown - High Severity    Sulfa Antibiotics Unknown - High Severity         SOCIAL HISTORY:  Social History     Socioeconomic History    Marital status: Unknown   Tobacco Use    Smoking status: Never    Smokeless tobacco: Never   Vaping Use    Vaping Use: Never used   Substance and Sexual Activity    Alcohol use: Never    Drug use: Never    Sexual activity: Defer       FAMILY HISTORY:  Family History   Problem Relation Age of Onset    COPD Mother     Heart disease Father     Alcohol abuse Father        REVIEW OF SYSTEMS:  Fatigue, limited mobility, deconditioning  All other systems were reviewed and are negative.    OBJECTIVE     PHYSICAL EXAMINATION:   BP (!) 187/83   Pulse 68   Temp 97.9 °F (36.6 °C)   Resp 16   SpO2 97%     Physical Exam    General Appearance:  Patient appears chronically ill, no signs of distress noted.   Lungs:   Breath sounds are equal bilaterally. No crackles or wheezing noted.   Heart:  Normal S1 and S2, no murmur, no gallop, no rub. No JVD.   Abdomen:   Soft, nontender, non distended, no guarding or rebound tenderness.  Bowel sounds are normal, no organomegaly.     Extremities: No edema, cyanosis or clubbing.     Musculoskeletal: Deconditioning noted.     Skin: No wounds or rash noted.     Neurologic:  Psychiatric:                           Grossly non focal.   Calm affect.       RECORDS  REVIEW:   I have reviewed available medical records.  January 16, 2023: Glucose 203, sodium 136, potassium 5.0, BUN/creatinine 13/0.7, LFTs within normal, WBCs 8.7, H&H 11.2/35.2, platelets 247  December 20, 2022: Glucose 195, sodium 137, potassium 4.4, BUN/creatinine 13/0.7, LFTs within normal, hemoglobin A1c 7.4, WBCs 9.9, H&H 10.2/31, platelets 213, TSH 1.94    ASSESSMENT AND PLAN     ASSESSMENT AND PLAN:    1. Non-STEMI (non-ST elevated myocardial infarction) (HCC) (Primary)  Comments:  Stable at present with no signs of ischemia, continue secondary prevention, Plavix, isosorbide and nitroglycerin as needed     2. Type 2 diabetes mellitus with hyperglycemia, unspecified whether long term insulin use (HCC)  Comments:  Blood glucose controlled at present, continue Tradjenta and correction dose coverage     3. Longstanding persistent atrial fibrillation (HCC)  Comments:  Clinically, rate is controlled at present on metoprolol, continue anticoagulation in the form of Eliquis     4. Essential (primary) hypertension  Comments:  Controlled on metoprolol and lisinopril, continue the same and monitor closely     5. Systolic congestive heart failure, unspecified HF chronicity ([facility])  Comments:  Stable at present, no signs of decompensation, of note records are unavailable in this regard     6. Anxiety and depression  Comments:  Continue mirtazapine and lorazepam, recommend psych services follow-up     7. Hypothyroidism, unspecified type  Comments:  Continue Synthroid, TSH is at therapeutic level, monitor and modify accordingly       GENERAL MANAGEMENT:    [x]  Plan of Care Reviewed   [x]  Aspiration and fall precautions  [x]  Nutritional support and hydration  [x]  Bowel hygiene  [x]  Skin and wound care, strict offloading and pressure relief  [x]  PT/OT Reviewed   [x]  Blood glucose monitoring, hypoglycemic precautions, correction dose coverage as ordered if applicable  [x]  GDR if/as clinically indicated  [x]  Consultant  follow up as clinically indicated  [x]  Code Status: DO NOT RESUSCITATE, DO NOT INTUBATE  [x]  Discussed in detail with nursing/staff, addressed all needs today              Radha Hopper MD.

## 2023-02-27 ENCOUNTER — NURSING HOME (OUTPATIENT)
Dept: FAMILY MEDICINE CLINIC | Facility: CLINIC | Age: 88
End: 2023-02-27
Payer: MEDICARE

## 2023-02-27 DIAGNOSIS — L72.9 SUBCUTANEOUS CYST: Primary | ICD-10-CM

## 2023-02-28 NOTE — PROGRESS NOTES
"Nursing Home Follow Up Note      Bean Ag DO []   RAINA Koehler []    RAINA Blake [x]   [address] [place] [address]  Phone: [phone]  Fax: [phone] Radha Hopper MD []    Justino Weston DO []   [address] [place] [address]  Phone: [phone]  Fax: [phone]     PATIENT NAME: Breonna Gongora                                                                          YOB: 1935           DATE OF SERVICE: 01/30/2023  FACILITY:  [x][facility]   [] [facility]   [] [facility]   [] [facility]   [] Other ______________________________________________________________________      CHIEF COMPLAINT:  Decreased hearing, cognitive change      HISTORY OF PRESENT ILLNESS:   Breonna Gongora is a 87 y.o. female who is being seen today at the request of the nursing staff. Report that family members have expressed concern of decreased hearing and that patient is \"not acting like herself\". Family is concerned that patient's ears may be \"clogged\" as she reports that she is unable to hear when it has never been an issue in the past. Family also expresses concern that patient may have an infection or possible medication interaction. Patient is not sleeping well at night and is more confused during the day. Urinalysis collected 1/27 was within normal limits. At time of appointment today, patient is sitting in bed, watching television. Patient is alert and appears at baseline. Does report decreased hearing.     PAST MEDICAL & SURGICAL HISTORY:   Past Medical History:   Diagnosis Date   • Allergic    • Anemia    • CHF (congestive heart failure) (HCC)    • Depression    • Hypothyroidism    • Stroke (HCC)       Past Surgical History:   Procedure Laterality Date   • APPENDECTOMY     • CATARACT EXTRACTION N/A    • TOTAL ABDOMINAL HYSTERECTOMY WITH SALPINGO OOPHORECTOMY           MEDICATIONS:  I have reviewed and reconciled the patients medication list in the patients chart at " the skilled nursing facility today.      ALLERGIES:    Allergies   Allergen Reactions   • Penicillins Unknown - High Severity   • Sulfa Antibiotics Unknown - High Severity         SOCIAL HISTORY:    Social History     Socioeconomic History   • Marital status: Unknown   Tobacco Use   • Smoking status: Never   • Smokeless tobacco: Never   Substance and Sexual Activity   • Alcohol use: Never   • Drug use: Never   • Sexual activity: Defer       FAMILY HISTORY:    Family History   Problem Relation Age of Onset   • COPD Mother    • Heart disease Father    • Alcohol abuse Father        REVIEW OF SYSTEMS:    Review of Systems   Constitutional: Negative for fatigue.   HENT: Positive for hearing loss. Negative for ear discharge and ear pain.    Cardiovascular: Negative for chest pain, palpitations and leg swelling.   Neurological: Negative for headache and confusion.   Psychiatric/Behavioral: Positive for sleep disturbance.   ROS supplemented by nursing staff and family.      PHYSICAL EXAMINATION:   VITAL SIGNS:   Vitals:    01/30/23 0840   BP: 170/55   Pulse: 74   Resp: 17   Temp: 97.6 °F (36.4 °C)   SpO2: 96%       Nursing notes and vital signs reviewed.   General Appearance:  Obese female sitting in bed, NAD.    Ears: TM normal, no cerumen impaction noted.   Head: Normocephalic and atraumatic, without obvious abnormality     Eyes: PERRLA.  Conjunctivae and sclerae normal.    Neck: Normal range of motion. Neck supple. No JVD present.   Cardiovascular: Normal rate, regular rhythm.  Pulses palpable equal bilaterally.    Pulmonary/Chest: Effort normal and breath sounds normal. No respiratory distress.   Abdominal: Soft. Bowel sounds are normal. No distention or tenderness.     Musculoskeletal: Without obvious deformity.    Neurological: Awake.  Appears at baseline cognition.    Skin: Skin is warm and dry.   Psychiatric:  Normal mood and affect. Normal behavior        RECORDS REVIEW:   I have reviewed and interpreted the  following nursing progress notes, current medication orders, MAR, Vital signs, recent labs and urinalysis obtained at the time of the visit today.     1/16/23--, BUN 13, Cr. 0.7, AST 9, ALT 3, Hgb 11.2, Hct 35.2    ASSESSMENT   Diagnoses and all orders for this visit:    1. Insomnia, unspecified type (Primary)    2. Decreased hearing of both ears    3. Major depressive disorder, single episode, mild (HCC)    4. Generalized anxiety disorder        PLAN  --Increase Melatonin to 10mg QHS sleep  --VS per facility policy  --assist patient with mobility and ADLs as needed to promote safety and independence  --notify MD or myself of any acute changes in condition    [x]  Discussed Patient in detail with nursing/staff, addressed all needs today.     [x]  Plan of Care Reviewed   []  PT/OT Reviewed   []  Order Changes  []  Discharge Plans Reviewed  [x]  Advance Directive on file with Nursing Home.   [x]  POA on file with Nursing Home.   [x]  Code Status listed: []  Full Code   []  DNR     I spent 35 minutes caring for Breonna on this date of service. This time includes time spent by me in the following activities:preparing for the visit, reviewing tests, obtaining and/or reviewing a separately obtained history, performing a medically appropriate examination and/or evaluation , counseling and educating the patient/family/caregiver, ordering medications, tests, or procedures, referring and communicating with other health care professionals , documenting information in the medical record, independently interpreting results and communicating that information with the patient/family/caregiver and care coordination      Arianne Mcintosh, RAINA  01/30/2023

## 2023-03-03 RX ORDER — HYDROCODONE BITARTRATE AND ACETAMINOPHEN 5; 325 MG/1; MG/1
1 TABLET ORAL EVERY 6 HOURS PRN
Qty: 120 TABLET | Refills: 0 | Status: SHIPPED | OUTPATIENT
Start: 2023-03-03

## 2023-03-03 NOTE — TELEPHONE ENCOUNTER
[facility] REQUESTING MED REFILL FOR NORCO 5-325 MG.    DIRECTIONS: NORCO 5-325 MG 1 TAB PO Q 6 HRS PRN.

## 2023-03-27 VITALS
OXYGEN SATURATION: 96 % | TEMPERATURE: 97.4 F | HEART RATE: 67 BPM | DIASTOLIC BLOOD PRESSURE: 60 MMHG | SYSTOLIC BLOOD PRESSURE: 124 MMHG | RESPIRATION RATE: 18 BRPM | WEIGHT: 157.1 LBS

## 2023-03-27 NOTE — PROGRESS NOTES
Nursing Home Follow Up Note      Bean Ag DO []   RAINA Koehler []    RAINA Blake [x]   [address] [place] [address]  Phone: [phone]  Fax: [phone] Radha Hopper MD []    Justino Weston DO []   [address] [place] [address]  Phone: [phone]  Fax: [phone]     PATIENT NAME: Breonna Gongora                                                                          YOB: 1935           DATE OF SERVICE: 02/27/2023  FACILITY:  [x][facility]   [] [facility]   [] [facility]   [] [facility]   [] Other ______________________________________________________________________      CHIEF COMPLAINT:  Cyst right forehead      HISTORY OF PRESENT ILLNESS:   Breonna Gongora is a 87 y.o. female who is being seen today at the request of the nursing staff. Patient has a round mass on right forehead that appeared shortly after a fall in 2022. Nodule has never resolved. Patient denies pain but due to its unsightly presentation, patient would like to have it removed. At time of visit today, patient is wandering in [place]bin in wheelchair.     PAST MEDICAL & SURGICAL HISTORY:   Past Medical History:   Diagnosis Date   • Allergic    • Anemia    • CHF (congestive heart failure) (HCC)    • Depression    • Hypothyroidism    • Stroke (HCC)       Past Surgical History:   Procedure Laterality Date   • APPENDECTOMY     • CATARACT EXTRACTION N/A    • TOTAL ABDOMINAL HYSTERECTOMY WITH SALPINGO OOPHORECTOMY           MEDICATIONS:  I have reviewed and reconciled the patients medication list in the patients chart at the skilled nursing facility today.      ALLERGIES:    Allergies   Allergen Reactions   • Penicillins Unknown - High Severity   • Sulfa Antibiotics Unknown - High Severity         SOCIAL HISTORY:    Social History     Socioeconomic History   • Marital status: Unknown   Tobacco Use   • Smoking status: Never   • Smokeless tobacco: Never   Substance and Sexual Activity   •  Alcohol use: Never   • Drug use: Never   • Sexual activity: Defer       FAMILY HISTORY:    Family History   Problem Relation Age of Onset   • COPD Mother    • Heart disease Father    • Alcohol abuse Father        REVIEW OF SYSTEMS:    Review of Systems   Constitutional: Negative for fatigue.   HENT: Positive for hearing loss. Negative for ear discharge and ear pain.    Cardiovascular: Negative for chest pain, palpitations and leg swelling.   Skin: Positive for skin lesions (round, nodule right forehead).   Neurological: Negative for headache and confusion.   Psychiatric/Behavioral: Positive for sleep disturbance.   ROS supplemented by nursing staff and family.      PHYSICAL EXAMINATION:   VITAL SIGNS:   Vitals:    02/27/23 0800   BP: 124/60   Pulse: 67   Resp: 18   Temp: 97.4 °F (36.3 °C)   SpO2: 96%       Nursing notes and vital signs reviewed.   General Appearance:  Elderly male, sitting in wheelchair, NAD.    Ears: TM normal, no cerumen impaction noted.   Head: Round, mobile, soft, nontender nodule right forehead--approximately 1.5in in diameter   Eyes: PERRLA.  Conjunctivae and sclerae normal.    Neck: Normal range of motion. Neck supple. No JVD present.   Cardiovascular: Normal rate, regular rhythm.  Pulses palpable equal bilaterally.    Pulmonary/Chest: Effort normal and breath sounds normal. No respiratory distress.   Abdominal: Soft. Bowel sounds are normal. No distention or tenderness.     Musculoskeletal: Without obvious deformity.    Neurological: Awake.  Appears at baseline cognition.    Skin: Skin is warm and dry.    Psychiatric:  Normal mood and affect. Normal behavior        RECORDS REVIEW:    progress notes    1/16/23--, BUN 13, Cr. 0.7, AST 9, ALT 3, Hgb 11.2, Hct 35.2    ASSESSMENT   Diagnoses and all orders for this visit:    1. Subcutaneous cyst (Primary)  -     Ambulatory Referral to Dermatology        PLAN  --Dermatology referral placed  --VS per facility policy  --assist patient with  mobility and ADLs as needed to promote safety and independence  --notify MD or myself of any acute changes in condition    [x]  Discussed Patient in detail with nursing/staff, addressed all needs today.     [x]  Plan of Care Reviewed   []  PT/OT Reviewed   []  Order Changes  []  Discharge Plans Reviewed  [x]  Advance Directive on file with Nursing Home.   [x]  POA on file with Nursing Home.   [x]  Code Status listed: []  Full Code   []  DNR     I spent 35 minutes caring for Breonna on this date of service. This time includes time spent by me in the following activities:preparing for the visit, reviewing tests, obtaining and/or reviewing a separately obtained history, performing a medically appropriate examination and/or evaluation , counseling and educating the patient/family/caregiver, ordering medications, tests, or procedures, referring and communicating with other health care professionals , documenting information in the medical record, independently interpreting results and communicating that information with the patient/family/caregiver and care coordination      Arianne Mcintosh, APRN  02/27/2023

## 2023-04-03 RX ORDER — LORAZEPAM 0.5 MG/1
0.5 TABLET ORAL EVERY 4 HOURS PRN
Qty: 180 TABLET | Refills: 3 | Status: SHIPPED | OUTPATIENT
Start: 2023-04-03

## 2023-04-03 NOTE — TELEPHONE ENCOUNTER
(NEW SCRIPT)    [facility] REQUESTING MED REFILL FOR LORAZEPAM 0.5 MG.    DIRECTIONS: LORAZEPAM 0.5 MG 1 TAB PO Q 4 HRS PRN.

## 2023-04-10 RX ORDER — HYDROCODONE BITARTRATE AND ACETAMINOPHEN 5; 325 MG/1; MG/1
1 TABLET ORAL EVERY 6 HOURS PRN
Qty: 120 TABLET | Refills: 0 | Status: SHIPPED | OUTPATIENT
Start: 2023-04-10

## 2023-04-28 RX ORDER — CHLORDIAZEPOXIDE HYDROCHLORIDE 5 MG/1
5 CAPSULE, GELATIN COATED ORAL 2 TIMES DAILY
Qty: 60 CAPSULE | Refills: 5 | Status: CANCELLED | OUTPATIENT
Start: 2023-04-28

## 2023-04-28 RX ORDER — CHLORDIAZEPOXIDE HYDROCHLORIDE 5 MG/1
5 CAPSULE, GELATIN COATED ORAL 2 TIMES DAILY
Qty: 60 CAPSULE | Refills: 5 | Status: SHIPPED | OUTPATIENT
Start: 2023-04-28

## 2023-04-28 NOTE — TELEPHONE ENCOUNTER
[facility] REQUESTING MED REFILL FOR CHLORDIAZEPOXIDE 5 MG.    DIRECTIONS: CHLORDIAZEPOXIDE 5 MG 1 CAP PO BID.

## 2023-05-10 ENCOUNTER — NURSING HOME (OUTPATIENT)
Dept: INTERNAL MEDICINE | Facility: CLINIC | Age: 88
End: 2023-05-10
Payer: MEDICARE

## 2023-05-10 VITALS
DIASTOLIC BLOOD PRESSURE: 76 MMHG | TEMPERATURE: 97.7 F | OXYGEN SATURATION: 97 % | HEART RATE: 62 BPM | SYSTOLIC BLOOD PRESSURE: 142 MMHG | RESPIRATION RATE: 18 BRPM

## 2023-05-10 DIAGNOSIS — H61.23 IMPACTED CERUMEN OF BOTH EARS: Primary | ICD-10-CM

## 2023-05-10 NOTE — PROGRESS NOTES
Nursing Home Progress Note        Bean Ag DO []  RAINA Koehler []  [address] [place] [address]  Phone: [phone]  Fax: [phone] Radha Hopper MD []  Justino Weston DO []  Emperatriz Viveros PA-C [x]   [address] [place] [address]  Phone: [phone]  Fax: [phone]     PATIENT NAME: Breonna Gongora                                                                          YOB: 1935           DATE OF SERVICE: 05/10/2023  FACILITY:  [x] [place]   [] [place]   [] [facility]   [] [facility]   [] Other ______________________________________________________________________     CHIEF COMPLAINT:  Difficult to hear and her ears feel full      HISTORY OF PRESENT ILLNESS:   Patient is an 87-year-old female seen today at request of nursing staff.  She has been complaining of ear fullness and decrease in hearing bilaterally.  Denies any pain, sinus pressure, sore throat, or ear drainage.    Today at visit patient is lying comfortably in bed.    PAST MEDICAL & SURGICAL HISTORY:   Past Medical History:   Diagnosis Date   • Allergic    • Anemia    • CHF (congestive heart failure)    • Depression    • Hypothyroidism    • Stroke       Past Surgical History:   Procedure Laterality Date   • APPENDECTOMY     • CATARACT EXTRACTION N/A    • TOTAL ABDOMINAL HYSTERECTOMY WITH SALPINGO OOPHORECTOMY           MEDICATIONS:  I have reviewed and reconciled the patients medication list in the patients chart at the skilled nursing facility today.      ALLERGIES:  Allergies   Allergen Reactions   • Penicillins Unknown - High Severity   • Sulfa Antibiotics Unknown - High Severity         SOCIAL HISTORY:  Social History     Socioeconomic History   • Marital status: Unknown   Tobacco Use   • Smoking status: Never   • Smokeless tobacco: Never   Substance and Sexual Activity   • Alcohol use: Never   • Drug use: Never   • Sexual activity: Defer       FAMILY HISTORY:  Family History    Problem Relation Age of Onset   • COPD Mother    • Heart disease Father    • Alcohol abuse Father          PHYSICAL EXAMINATION:     VITAL SIGNS:  /76   Pulse 62   Temp 97.7 °F (36.5 °C)   Resp 18   SpO2 97%     Physical Exam  Constitutional:       Appearance: Normal appearance.   HENT:      Head: Normocephalic and atraumatic.      Right Ear: There is impacted cerumen.      Left Ear: There is impacted cerumen.      Nose: Nose normal.      Mouth/Throat:      Mouth: Mucous membranes are moist.      Pharynx: Oropharynx is clear.   Eyes:      Extraocular Movements: Extraocular movements intact.      Pupils: Pupils are equal, round, and reactive to light.   Cardiovascular:      Rate and Rhythm: Normal rate and regular rhythm.      Pulses: Normal pulses.      Heart sounds: Normal heart sounds.   Pulmonary:      Effort: Pulmonary effort is normal.      Breath sounds: Normal breath sounds.   Skin:     General: Skin is warm.      Findings: No rash.   Neurological:      General: No focal deficit present.      Mental Status: She is alert.   Psychiatric:         Mood and Affect: Mood normal.         Behavior: Behavior normal.         RECORDS REVIEW:   I have reviewed and interpreted the following lab test results  today.     ASSESSMENT     Diagnoses and all orders for this visit:    1. Impacted cerumen of both ears (Primary)      PLAN  - Debrox 4 drops twice a day for 5 days.  - On fifth day lavage at night    [x]  Discussed Patient in detail with nursing/staff, addressed all needs today.     [x]  Plan of Care Reviewed   []  PT/OT Reviewed   []  Order Changes  []  Discharge Plans Reviewed  [x]  Advance Directive on file with Nursing Home.   [x]  POA on file with Nursing Home.    [x]  Code Status listed:  []  Full Code   [x]  DNR    I spent 10 minutes in direct patient care including face to face and floor time.        ROLLY Phan MR  [facility]  [address] [place] [address]  [address] [address] [phone]

## 2023-05-10 NOTE — LETTER
Nursing Home Progress Note        Bean Ag DO []  RAINA Koehler []  [address] [place] [address]  Phone: [phone]  Fax: [phone] Radha Hopper MD []  Justino Weston DO []  Emperatriz Viveros PA-C [x]   [address] [place] [address]  Phone: [phone]  Fax: [phone]     PATIENT NAME: Breonna Gongora                                                                          YOB: 1935           DATE OF SERVICE: 05/10/2023  FACILITY:  [x] [place]   [] [place]   [] [facility]   [] [facility]   [] Other ______________________________________________________________________     CHIEF COMPLAINT:  Difficult to hear and her ears feel full      HISTORY OF PRESENT ILLNESS:   Patient is an 87-year-old female seen today at request of nursing staff.  She has been complaining of ear fullness and decrease in hearing bilaterally.  Denies any pain, sinus pressure, sore throat, or ear drainage.    Today at visit patient is lying comfortably in bed.    PAST MEDICAL & SURGICAL HISTORY:   Past Medical History:   Diagnosis Date   • Allergic    • Anemia    • CHF (congestive heart failure)    • Depression    • Hypothyroidism    • Stroke       Past Surgical History:   Procedure Laterality Date   • APPENDECTOMY     • CATARACT EXTRACTION N/A    • TOTAL ABDOMINAL HYSTERECTOMY WITH SALPINGO OOPHORECTOMY           MEDICATIONS:  I have reviewed and reconciled the patients medication list in the patients chart at the skilled nursing facility today.      ALLERGIES:  Allergies   Allergen Reactions   • Penicillins Unknown - High Severity   • Sulfa Antibiotics Unknown - High Severity         SOCIAL HISTORY:  Social History     Socioeconomic History   • Marital status: Unknown   Tobacco Use   • Smoking status: Never   • Smokeless tobacco: Never   Substance and Sexual Activity   • Alcohol use: Never   • Drug use: Never   • Sexual activity: Defer       FAMILY HISTORY:  Family History    Problem Relation Age of Onset   • COPD Mother    • Heart disease Father    • Alcohol abuse Father          PHYSICAL EXAMINATION:     VITAL SIGNS:  /76   Pulse 62   Temp 97.7 °F (36.5 °C)   Resp 18   SpO2 97%     Physical Exam  Constitutional:       Appearance: Normal appearance.   HENT:      Head: Normocephalic and atraumatic.      Right Ear: There is impacted cerumen.      Left Ear: There is impacted cerumen.      Nose: Nose normal.      Mouth/Throat:      Mouth: Mucous membranes are moist.      Pharynx: Oropharynx is clear.   Eyes:      Extraocular Movements: Extraocular movements intact.      Pupils: Pupils are equal, round, and reactive to light.   Cardiovascular:      Rate and Rhythm: Normal rate and regular rhythm.      Pulses: Normal pulses.      Heart sounds: Normal heart sounds.   Pulmonary:      Effort: Pulmonary effort is normal.      Breath sounds: Normal breath sounds.   Skin:     General: Skin is warm.      Findings: No rash.   Neurological:      General: No focal deficit present.      Mental Status: She is alert.   Psychiatric:         Mood and Affect: Mood normal.         Behavior: Behavior normal.         RECORDS REVIEW:   I have reviewed and interpreted the following lab test results  today.     ASSESSMENT     Diagnoses and all orders for this visit:    1. Impacted cerumen of both ears (Primary)      PLAN  - Debrox 4 drops twice a day for 5 days.  - On fifth day lavage at night    [x]  Discussed Patient in detail with nursing/staff, addressed all needs today.     [x]  Plan of Care Reviewed   []  PT/OT Reviewed   []  Order Changes  []  Discharge Plans Reviewed  [x]  Advance Directive on file with Nursing Home.   [x]  POA on file with Nursing Home.    [x]  Code Status listed:  []  Full Code   [x]  DNR    I spent 10 minutes in direct patient care including face to face and floor time.        ROLLY Phan MR  [facility]  [address] [place] [address]  [address] [address] [phone]

## 2023-05-11 ENCOUNTER — APPOINTMENT (OUTPATIENT)
Dept: ULTRASOUND IMAGING | Facility: HOSPITAL | Age: 88
End: 2023-05-11
Payer: MEDICARE

## 2023-05-11 ENCOUNTER — APPOINTMENT (OUTPATIENT)
Dept: GENERAL RADIOLOGY | Facility: HOSPITAL | Age: 88
End: 2023-05-11
Payer: MEDICARE

## 2023-05-11 ENCOUNTER — HOSPITAL ENCOUNTER (INPATIENT)
Facility: HOSPITAL | Age: 88
LOS: 5 days | Discharge: NURSING, REHAB OR LONG-TERM CARE | End: 2023-05-16
Attending: EMERGENCY MEDICINE
Payer: MEDICARE

## 2023-05-11 ENCOUNTER — APPOINTMENT (OUTPATIENT)
Dept: CT IMAGING | Facility: HOSPITAL | Age: 88
End: 2023-05-11
Payer: MEDICARE

## 2023-05-11 DIAGNOSIS — J90 BILATERAL PLEURAL EFFUSION: ICD-10-CM

## 2023-05-11 DIAGNOSIS — I16.1 HYPERTENSIVE EMERGENCY: ICD-10-CM

## 2023-05-11 DIAGNOSIS — I50.9 ACUTE ON CHRONIC CONGESTIVE HEART FAILURE, UNSPECIFIED HEART FAILURE TYPE: Primary | ICD-10-CM

## 2023-05-11 DIAGNOSIS — R04.0 EPISTAXIS: ICD-10-CM

## 2023-05-11 DIAGNOSIS — D72.829 LEUKOCYTOSIS, UNSPECIFIED TYPE: ICD-10-CM

## 2023-05-11 DIAGNOSIS — F32.0 MAJOR DEPRESSIVE DISORDER, SINGLE EPISODE, MILD: ICD-10-CM

## 2023-05-11 LAB
ALBUMIN SERPL-MCNC: 2.7 G/DL (ref 3.5–5.2)
ALBUMIN/GLOB SERPL: 0.8 G/DL
ALP SERPL-CCNC: 72 U/L (ref 39–117)
ALT SERPL W P-5'-P-CCNC: <5 U/L (ref 1–33)
ANION GAP SERPL CALCULATED.3IONS-SCNC: 11.1 MMOL/L (ref 5–15)
AST SERPL-CCNC: 11 U/L (ref 1–32)
BACTERIA UR QL AUTO: ABNORMAL /HPF
BASOPHILS # BLD AUTO: 0.07 10*3/MM3 (ref 0–0.2)
BASOPHILS NFR BLD AUTO: 0.4 % (ref 0–1.5)
BILIRUB SERPL-MCNC: 0.3 MG/DL (ref 0–1.2)
BILIRUB UR QL STRIP: NEGATIVE
BUN SERPL-MCNC: 16 MG/DL (ref 8–23)
BUN/CREAT SERPL: 22.5 (ref 7–25)
CALCIUM SPEC-SCNC: 8.9 MG/DL (ref 8.6–10.5)
CHLORIDE SERPL-SCNC: 98 MMOL/L (ref 98–107)
CLARITY UR: CLEAR
CO2 SERPL-SCNC: 28.9 MMOL/L (ref 22–29)
COLOR UR: YELLOW
CREAT SERPL-MCNC: 0.71 MG/DL (ref 0.57–1)
D-LACTATE SERPL-SCNC: 1.3 MMOL/L (ref 0.5–2)
DEPRECATED RDW RBC AUTO: 52.3 FL (ref 37–54)
EGFRCR SERPLBLD CKD-EPI 2021: 82.4 ML/MIN/1.73
EOSINOPHIL # BLD AUTO: 0.07 10*3/MM3 (ref 0–0.4)
EOSINOPHIL NFR BLD AUTO: 0.4 % (ref 0.3–6.2)
ERYTHROCYTE [DISTWIDTH] IN BLOOD BY AUTOMATED COUNT: 15.6 % (ref 12.3–15.4)
GLOBULIN UR ELPH-MCNC: 3.6 GM/DL
GLUCOSE BLDC GLUCOMTR-MCNC: 247 MG/DL (ref 70–130)
GLUCOSE SERPL-MCNC: 224 MG/DL (ref 65–99)
GLUCOSE UR STRIP-MCNC: ABNORMAL MG/DL
HCT VFR BLD AUTO: 42.3 % (ref 34–46.6)
HGB BLD-MCNC: 13.3 G/DL (ref 12–15.9)
HGB UR QL STRIP.AUTO: ABNORMAL
HOLD SPECIMEN: NORMAL
HOLD SPECIMEN: NORMAL
HYALINE CASTS UR QL AUTO: ABNORMAL /LPF
IMM GRANULOCYTES # BLD AUTO: 0.07 10*3/MM3 (ref 0–0.05)
IMM GRANULOCYTES NFR BLD AUTO: 0.4 % (ref 0–0.5)
KETONES UR QL STRIP: NEGATIVE
LEUKOCYTE ESTERASE UR QL STRIP.AUTO: NEGATIVE
LIPASE SERPL-CCNC: 16 U/L (ref 13–60)
LYMPHOCYTES # BLD AUTO: 1.99 10*3/MM3 (ref 0.7–3.1)
LYMPHOCYTES NFR BLD AUTO: 12.3 % (ref 19.6–45.3)
MCH RBC QN AUTO: 28.8 PG (ref 26.6–33)
MCHC RBC AUTO-ENTMCNC: 31.4 G/DL (ref 31.5–35.7)
MCV RBC AUTO: 91.6 FL (ref 79–97)
MONOCYTES # BLD AUTO: 1.54 10*3/MM3 (ref 0.1–0.9)
MONOCYTES NFR BLD AUTO: 9.5 % (ref 5–12)
NEUTROPHILS NFR BLD AUTO: 12.43 10*3/MM3 (ref 1.7–7)
NEUTROPHILS NFR BLD AUTO: 77 % (ref 42.7–76)
NITRITE UR QL STRIP: NEGATIVE
NRBC BLD AUTO-RTO: 0 /100 WBC (ref 0–0.2)
NT-PROBNP SERPL-MCNC: ABNORMAL PG/ML (ref 0–1800)
PH UR STRIP.AUTO: 7 [PH] (ref 5–8)
PLATELET # BLD AUTO: 256 10*3/MM3 (ref 140–450)
PMV BLD AUTO: 12.2 FL (ref 6–12)
POTASSIUM SERPL-SCNC: 4.3 MMOL/L (ref 3.5–5.2)
PROCALCITONIN SERPL-MCNC: 0.05 NG/ML (ref 0–0.25)
PROT SERPL-MCNC: 6.3 G/DL (ref 6–8.5)
PROT UR QL STRIP: ABNORMAL
RBC # BLD AUTO: 4.62 10*6/MM3 (ref 3.77–5.28)
RBC # UR STRIP: ABNORMAL /HPF
REF LAB TEST METHOD: ABNORMAL
SODIUM SERPL-SCNC: 138 MMOL/L (ref 136–145)
SP GR UR STRIP: 1.01 (ref 1–1.03)
SQUAMOUS #/AREA URNS HPF: ABNORMAL /HPF
UROBILINOGEN UR QL STRIP: ABNORMAL
WBC # UR STRIP: ABNORMAL /HPF
WBC NRBC COR # BLD: 16.17 10*3/MM3 (ref 3.4–10.8)
WHOLE BLOOD HOLD COAG: NORMAL
WHOLE BLOOD HOLD SPECIMEN: NORMAL

## 2023-05-11 PROCEDURE — 87040 BLOOD CULTURE FOR BACTERIA: CPT | Performed by: PHYSICIAN ASSISTANT

## 2023-05-11 PROCEDURE — 99285 EMERGENCY DEPT VISIT HI MDM: CPT

## 2023-05-11 PROCEDURE — 25010000002 FUROSEMIDE PER 20 MG: Performed by: PHYSICIAN ASSISTANT

## 2023-05-11 PROCEDURE — 74176 CT ABD & PELVIS W/O CONTRAST: CPT

## 2023-05-11 PROCEDURE — 83605 ASSAY OF LACTIC ACID: CPT | Performed by: PHYSICIAN ASSISTANT

## 2023-05-11 PROCEDURE — 25010000002 CEFEPIME-DEXTROSE 2-5 GM-%(50ML) RECONSTITUTED SOLUTION: Performed by: PHYSICIAN ASSISTANT

## 2023-05-11 PROCEDURE — 71250 CT THORAX DX C-: CPT

## 2023-05-11 PROCEDURE — 2Y41X5Z PACKING OF NASAL REGION USING PACKING MATERIAL: ICD-10-PCS | Performed by: EMERGENCY MEDICINE

## 2023-05-11 PROCEDURE — 99222 1ST HOSP IP/OBS MODERATE 55: CPT | Performed by: INTERNAL MEDICINE

## 2023-05-11 PROCEDURE — 84145 PROCALCITONIN (PCT): CPT | Performed by: PHYSICIAN ASSISTANT

## 2023-05-11 PROCEDURE — 83690 ASSAY OF LIPASE: CPT

## 2023-05-11 PROCEDURE — 82948 REAGENT STRIP/BLOOD GLUCOSE: CPT

## 2023-05-11 PROCEDURE — 25010000002 HYDRALAZINE PER 20 MG: Performed by: PHYSICIAN ASSISTANT

## 2023-05-11 PROCEDURE — 36415 COLL VENOUS BLD VENIPUNCTURE: CPT

## 2023-05-11 PROCEDURE — 81001 URINALYSIS AUTO W/SCOPE: CPT

## 2023-05-11 PROCEDURE — 85025 COMPLETE CBC W/AUTO DIFF WBC: CPT

## 2023-05-11 PROCEDURE — 83880 ASSAY OF NATRIURETIC PEPTIDE: CPT | Performed by: PHYSICIAN ASSISTANT

## 2023-05-11 PROCEDURE — 25010000002 VANCOMYCIN 5 G RECONSTITUTED SOLUTION: Performed by: PHYSICIAN ASSISTANT

## 2023-05-11 PROCEDURE — 76705 ECHO EXAM OF ABDOMEN: CPT

## 2023-05-11 PROCEDURE — 71045 X-RAY EXAM CHEST 1 VIEW: CPT

## 2023-05-11 PROCEDURE — 80053 COMPREHEN METABOLIC PANEL: CPT

## 2023-05-11 RX ORDER — DEXTROSE MONOHYDRATE 25 G/50ML
25 INJECTION, SOLUTION INTRAVENOUS
Status: DISCONTINUED | OUTPATIENT
Start: 2023-05-11 | End: 2023-05-16 | Stop reason: HOSPADM

## 2023-05-11 RX ORDER — HYDRALAZINE HYDROCHLORIDE 20 MG/ML
10 INJECTION INTRAMUSCULAR; INTRAVENOUS ONCE
Status: COMPLETED | OUTPATIENT
Start: 2023-05-11 | End: 2023-05-11

## 2023-05-11 RX ORDER — ACETAMINOPHEN 650 MG/1
650 SUPPOSITORY RECTAL EVERY 4 HOURS PRN
Status: DISCONTINUED | OUTPATIENT
Start: 2023-05-11 | End: 2023-05-16 | Stop reason: HOSPADM

## 2023-05-11 RX ORDER — CEFEPIME HYDROCHLORIDE 2 G/50ML
2 INJECTION, SOLUTION INTRAVENOUS ONCE
Status: COMPLETED | OUTPATIENT
Start: 2023-05-11 | End: 2023-05-11

## 2023-05-11 RX ORDER — SODIUM CHLORIDE 0.9 % (FLUSH) 0.9 %
10 SYRINGE (ML) INJECTION AS NEEDED
Status: DISCONTINUED | OUTPATIENT
Start: 2023-05-11 | End: 2023-05-16 | Stop reason: HOSPADM

## 2023-05-11 RX ORDER — ACETAMINOPHEN 160 MG/5ML
650 SOLUTION ORAL EVERY 4 HOURS PRN
Status: DISCONTINUED | OUTPATIENT
Start: 2023-05-11 | End: 2023-05-16 | Stop reason: HOSPADM

## 2023-05-11 RX ORDER — INSULIN ASPART 100 [IU]/ML
0-9 INJECTION, SOLUTION INTRAVENOUS; SUBCUTANEOUS
Status: DISCONTINUED | OUTPATIENT
Start: 2023-05-12 | End: 2023-05-16 | Stop reason: HOSPADM

## 2023-05-11 RX ORDER — POLYETHYLENE GLYCOL 3350 17 G/17G
17 POWDER, FOR SOLUTION ORAL DAILY PRN
Status: DISCONTINUED | OUTPATIENT
Start: 2023-05-11 | End: 2023-05-16 | Stop reason: HOSPADM

## 2023-05-11 RX ORDER — BISACODYL 5 MG/1
5 TABLET, DELAYED RELEASE ORAL DAILY PRN
Status: DISCONTINUED | OUTPATIENT
Start: 2023-05-11 | End: 2023-05-16 | Stop reason: HOSPADM

## 2023-05-11 RX ORDER — SODIUM CHLORIDE 9 MG/ML
40 INJECTION, SOLUTION INTRAVENOUS AS NEEDED
Status: DISCONTINUED | OUTPATIENT
Start: 2023-05-11 | End: 2023-05-16 | Stop reason: HOSPADM

## 2023-05-11 RX ORDER — ACETAMINOPHEN 325 MG/1
650 TABLET ORAL EVERY 4 HOURS PRN
Status: DISCONTINUED | OUTPATIENT
Start: 2023-05-11 | End: 2023-05-16 | Stop reason: HOSPADM

## 2023-05-11 RX ORDER — BISACODYL 10 MG
10 SUPPOSITORY, RECTAL RECTAL DAILY PRN
Status: DISCONTINUED | OUTPATIENT
Start: 2023-05-11 | End: 2023-05-16 | Stop reason: HOSPADM

## 2023-05-11 RX ORDER — ONDANSETRON 2 MG/ML
4 INJECTION INTRAMUSCULAR; INTRAVENOUS EVERY 6 HOURS PRN
Status: DISCONTINUED | OUTPATIENT
Start: 2023-05-11 | End: 2023-05-16 | Stop reason: HOSPADM

## 2023-05-11 RX ORDER — SODIUM CHLORIDE 0.9 % (FLUSH) 0.9 %
10 SYRINGE (ML) INJECTION EVERY 12 HOURS SCHEDULED
Status: DISCONTINUED | OUTPATIENT
Start: 2023-05-11 | End: 2023-05-16 | Stop reason: HOSPADM

## 2023-05-11 RX ORDER — AMOXICILLIN 250 MG
2 CAPSULE ORAL 2 TIMES DAILY
Status: DISCONTINUED | OUTPATIENT
Start: 2023-05-11 | End: 2023-05-16 | Stop reason: HOSPADM

## 2023-05-11 RX ORDER — TRANEXAMIC ACID 100 MG/ML
500 INJECTION, SOLUTION INTRAVENOUS ONCE
Status: COMPLETED | OUTPATIENT
Start: 2023-05-11 | End: 2023-05-11

## 2023-05-11 RX ORDER — NICOTINE POLACRILEX 4 MG
15 LOZENGE BUCCAL
Status: DISCONTINUED | OUTPATIENT
Start: 2023-05-11 | End: 2023-05-16 | Stop reason: HOSPADM

## 2023-05-11 RX ORDER — FUROSEMIDE 10 MG/ML
40 INJECTION INTRAMUSCULAR; INTRAVENOUS ONCE
Status: COMPLETED | OUTPATIENT
Start: 2023-05-11 | End: 2023-05-11

## 2023-05-11 RX ADMIN — ACETAMINOPHEN 650 MG: 325 TABLET, FILM COATED ORAL at 23:01

## 2023-05-11 RX ADMIN — VANCOMYCIN HYDROCHLORIDE 1500 MG: 5 INJECTION, POWDER, LYOPHILIZED, FOR SOLUTION INTRAVENOUS at 19:19

## 2023-05-11 RX ADMIN — CEFEPIME HYDROCHLORIDE 2 G: 2 INJECTION, SOLUTION INTRAVENOUS at 18:12

## 2023-05-11 RX ADMIN — FUROSEMIDE 40 MG: 10 INJECTION, SOLUTION INTRAMUSCULAR; INTRAVENOUS at 17:46

## 2023-05-11 RX ADMIN — NITROGLYCERIN 1 INCH: 20 OINTMENT TOPICAL at 17:46

## 2023-05-11 RX ADMIN — HYDRALAZINE HYDROCHLORIDE 10 MG: 20 INJECTION INTRAMUSCULAR; INTRAVENOUS at 20:48

## 2023-05-11 RX ADMIN — SENNOSIDES AND DOCUSATE SODIUM 2 TABLET: 50; 8.6 TABLET ORAL at 23:01

## 2023-05-11 RX ADMIN — TRANEXAMIC ACID 500 MG: 100 INJECTION, SOLUTION INTRAVENOUS at 20:18

## 2023-05-11 NOTE — ED PROVIDER NOTES
Subjective  History of Present Illness:    Chief Complaint: Abdominal Pain  History of Present Illness: Patient is an 87-year-old female with history of anemia, CHF, depression, hypothyroidism and stroke presenting to the ER for evaluation of abdominal pain.  Patient tells me that she has been having pain in her right lower abdomen that is intermittent in nature that started yesterday.  She came from the [facility], there was reported to her nurse that they felt a lump in her left upper quadrant.  Patient thinks she may be constipated.  She states she is only had a few small bowel movements but no substantial bowel movement over the past few days.  She states she is still passing gas.  She wears oxygen at all times.  She tells me she feels a little bit short of breath today, states that she was told she was holding onto fluids.  She denies any fever, chills, dizziness, chest pain, dysuria, hematuria, melena or hematochezia, or any other symptoms.  Onset: Yesterday  Duration: Intermittent   Exacerbating / Alleviating factors: None  Associated symptoms: None      Nurses Notes reviewed and agree, including vitals, allergies, social history and prior medical history.     REVIEW OF SYSTEMS: All systems reviewed and not pertinent unless noted.  Review of Systems      Positive for: Abdominal pain, shortness of breath    Negative for: Fever, chills, headache, dizziness, syncope, chest pain, hemoptysis, productive cough, diarrhea, emesis, dysuria, hematuria, melena or hematochezia    Past Medical History:   Diagnosis Date   • Allergic    • Anemia    • CHF (congestive heart failure)    • Depression    • Hypothyroidism    • Stroke        Allergies:    Penicillins and Sulfa antibiotics      Past Surgical History:   Procedure Laterality Date   • APPENDECTOMY     • CATARACT EXTRACTION N/A    • TOTAL ABDOMINAL HYSTERECTOMY WITH SALPINGO OOPHORECTOMY           Social History     Socioeconomic History   • Marital status:  "Unknown   Tobacco Use   • Smoking status: Never   • Smokeless tobacco: Never   Substance and Sexual Activity   • Alcohol use: Never   • Drug use: Never   • Sexual activity: Defer         Family History   Problem Relation Age of Onset   • COPD Mother    • Heart disease Father    • Alcohol abuse Father        Objective  Physical Exam:  BP (!) 174/102   Pulse 67   Temp 98 °F (36.7 °C) (Oral)   Resp 19   Ht 152.4 cm (60\")   Wt 71.2 kg (157 lb)   SpO2 95%   BMI 30.66 kg/m²      Physical Exam    CONSTITUTIONAL: Well developed, no acute distress, nontoxic in appearance.  She is awake, alert, speaking in full sentences.  VITAL SIGNS: per nursing, reviewed and noted  SKIN: exposed skin with no rashes, ulcerations or petechiae  EYES: Grossly EOMI, no icterus  ENT: Normal voice.  Nares are patent, no facial asymmetry  RESPIRATORY:  No increased work of breathing. No retractions.  Patient has hypoactive lung sounds bilaterally, no obvious rales or wheezes noted  CARDIOVASCULAR:  regular rate and rhythm.  Patient does have 1+ pitting edema in the bilateral lower extremities  GI: Abdomen soft, patient has some tenderness in the right lower quadrant without significant guarding or rigidity  MUSCULOSKELETAL:  No tenderness. Full ROM. Strength and tone grossly normal.   NEUROLOGIC: Alert, oriented x 3. No gross deficits. GCS 15.   PSYCH: appropriate affect.    Procedures    ED Course:        ED Course as of 05/11/23 2255   u May 11, 2023   1516 WBC(!): 16.17 [LR]   1541 Hemoglobin: 13.3 [LR]   1541 Platelets: 256 [LR]   1602 proBNP(!): 10,958.0 [LR]   1613 Glucose(!): 224 [LR]   1613 BUN: 16 [LR]   1613 Creatinine: 0.71 [LR]   1613 Sodium: 138 [LR]   1613 Potassium: 4.3 [LR]   1613 Chloride: 98 [LR]   1613 CO2: 28.9 [LR]   1613 Calcium: 8.9 [LR]   1613 Total Protein: 6.3 [LR]   1613 Albumin(!): 2.7 [LR]   1613 ALT (SGPT): <5 [LR]   1613 AST (SGOT): 11 [LR]   1613 Alkaline Phosphatase: 72 [LR]   1613 Total Bilirubin: 0.3 " [LR]   1613 Globulin: 3.6 [LR]   1613 A/G Ratio: 0.8 [LR]   1613 BUN/Creatinine Ratio: 22.5 [LR]   1613 Anion Gap: 11.1 [LR]   1613 eGFR: 82.4 [LR]   1633 IMPRESSION:  Cardiomegaly, bilateral pleural effusions, significant  subcutaneous edema and minimal pelvic ascites; consider CHF and  anasarca..     Bilateral lower lobe airspace disease, possible pneumonia.     Cholelithiasis, consider gallbladder ultrasound.     This report was signed and finalized on 5/11/2023 4:27 PM by Deyanira العراقي MD. [LR]   1639 Patient has significant bilateral pleural effusions, proBNP greater than 10,000 and hypertension. Discussed with Dr. Coleman, will give 40 lasix IV and nitro paste.  [LR]   1658 Daughter is at bedside.  She is in contact with her sister who is actually power of .  She states the patient has been on Lasix 40, states that they should have an updated medicine list from [place].  I did not see any kind of diuretic listed on their current medication list. [LR]   1729 Procalcitonin: 0.05 [LR]   1734 Spoke with Dr. Lombardi, he asked that we wait on the US and UA. Can cover with antibiotics for possible underlying pneumonia/ [LR]   1740 Discussed with Dr. Coleman, recommended cefepime and vancomycin for antibiotic coverage. [LR]   1903 Ultrasound was read by radiologist as gallstones, otherwise unremarkable. [LR]   1953 RBC, UA(!): 0-2 [LR]   1953 WBC, UA(!): 0-2 [LR]   1953 Bacteria, UA(!): Trace [LR]   1953 Squamous Epithelial Cells, UA: None Seen [LR]   1953 Hyaline Casts, UA: None Seen [LR]   1953 Methodology:: Manual Light Microscopy [LR]   1953 Color, UA: Yellow [LR]   1953 Appearance, UA: Clear [LR]   1953 pH, UA: 7.0 [LR]   1953 Specific Gravity, UA: 1.010 [LR]   1953 Glucose(!): 100 mg/dL (Trace) [LR]   1953 Ketones, UA: Negative [LR]   1953 Bilirubin, UA: Negative [LR]   1953 Blood, UA(!): Trace [LR]   1953 Protein, UA(!): >=300 mg/dL (3+) [LR]   1953 Leukocytes, UA: Negative [LR]   1953 Nitrite, UA:  Negative [LR]   1953 Urobilinogen, UA: 0.2 E.U./dL [LR]   2007 Patient began having a nose bleed. Discussed with Dr. Rodriguez, recommended TXA and nasal clamp.  [LR]   2008 It appears patient is on lisinopril for her blood pressure.  Blood pressure is very high here, will discuss with Dr. Rodriguez [LR]   2120 Discussed with Dr. Cabrera, she is going to put patient on a nitro drip, asked that we admit to telemetry [LR]      ED Course User Index  [LR] Deyanira Bhatt PA-C       Lab Results (last 24 hours)     Procedure Component Value Units Date/Time    CBC & Differential [[id number]]  (Abnormal) Collected: 05/11/23 1523    Specimen: Blood Updated: 05/11/23 1537    Narrative:      The following orders were created for panel order CBC & Differential.  Procedure                               Abnormality         Status                     ---------                               -----------         ------                     CBC Auto Differential[[id number]]        Abnormal            Final result                 Please view results for these tests on the individual orders.    Comprehensive Metabolic Panel [[id number]]  (Abnormal) Collected: 05/11/23 1523    Specimen: Blood Updated: 05/11/23 1611     Glucose 224 mg/dL      Comment: Glucose >180, Hemoglobin A1C recommended.        BUN 16 mg/dL      Creatinine 0.71 mg/dL      Sodium 138 mmol/L      Potassium 4.3 mmol/L      Chloride 98 mmol/L      CO2 28.9 mmol/L      Calcium 8.9 mg/dL      Total Protein 6.3 g/dL      Albumin 2.7 g/dL      ALT (SGPT) <5 U/L      AST (SGOT) 11 U/L      Alkaline Phosphatase 72 U/L      Total Bilirubin 0.3 mg/dL      Globulin 3.6 gm/dL      A/G Ratio 0.8 g/dL      BUN/Creatinine Ratio 22.5     Anion Gap 11.1 mmol/L      eGFR 82.4 mL/min/1.73     Narrative:      GFR Normal >60  Chronic Kidney Disease <60  Kidney Failure <15    The GFR formula is only valid for adults with stable renal function between ages 18 and 70.    Lipase [[id number]]  (Normal)  Collected: 05/11/23 1523    Specimen: Blood Updated: 05/11/23 1553     Lipase 16 U/L     CBC Auto Differential [[id number]]  (Abnormal) Collected: 05/11/23 1523    Specimen: Blood Updated: 05/11/23 1537     WBC 16.17 10*3/mm3      RBC 4.62 10*6/mm3      Hemoglobin 13.3 g/dL      Hematocrit 42.3 %      MCV 91.6 fL      MCH 28.8 pg      MCHC 31.4 g/dL      RDW 15.6 %      RDW-SD 52.3 fl      MPV 12.2 fL      Platelets 256 10*3/mm3      Neutrophil % 77.0 %      Lymphocyte % 12.3 %      Monocyte % 9.5 %      Eosinophil % 0.4 %      Basophil % 0.4 %      Immature Grans % 0.4 %      Neutrophils, Absolute 12.43 10*3/mm3      Lymphocytes, Absolute 1.99 10*3/mm3      Monocytes, Absolute 1.54 10*3/mm3      Eosinophils, Absolute 0.07 10*3/mm3      Basophils, Absolute 0.07 10*3/mm3      Immature Grans, Absolute 0.07 10*3/mm3      nRBC 0.0 /100 WBC     BNP [[id number]]  (Abnormal) Collected: 05/11/23 1523    Specimen: Blood Updated: 05/11/23 1603     proBNP 10,958.0 pg/mL     Narrative:      Among patients with dyspnea, NT-proBNP is highly sensitive for the detection of acute congestive heart failure. In addition NT-proBNP of <300 pg/ml effectively rules out acute congestive heart failure with 99% negative predictive value.    Results may be falsely decreased if patient taking Biotin.      Procalcitonin [[id number]]  (Normal) Collected: 05/11/23 1523    Specimen: Blood Updated: 05/11/23 1713     Procalcitonin 0.05 ng/mL     Narrative:      As a Marker for Sepsis (Non-Neonates):    1. <0.5 ng/mL represents a low risk of severe sepsis and/or septic shock.  2. >2 ng/mL represents a high risk of severe sepsis and/or septic shock.    As a Marker for Lower Respiratory Tract Infections that require antibiotic therapy:    PCT on Admission    Antibiotic Therapy       6-12 Hrs later    >0.5                Strongly Recommended  >0.25 - <0.5        Recommended   0.1 - 0.25          Discouraged              Remeasure/reassess PCT  <0.1           "      Strongly Discouraged     Remeasure/reassess PCT    As 28 day mortality risk marker: \"Change in Procalcitonin Result\" (>80% or <=80%) if Day 0 (or Day 1) and Day 4 values are available. Refer to http://[email or web address]-pct-calculator.com    Change in PCT <=80%  A decrease of PCT levels below or equal to 80% defines a positive change in PCT test result representing a higher risk for 28-day all-cause mortality of patients diagnosed with severe sepsis for septic shock.    Change in PCT >80%  A decrease of PCT levels of more than 80% defines a negative change in PCT result representing a lower risk for 28-day all-cause mortality of patients diagnosed with severe sepsis or septic shock.       Lactic Acid, Plasma [[id number]]  (Normal) Collected: 05/11/23 1650    Specimen: Blood Updated: 05/11/23 1735     Lactate 1.3 mmol/L     Blood Culture - Blood, Hand, Left [[id number]] Collected: 05/11/23 1650    Specimen: Blood from Hand, Left Updated: 05/11/23 1701    Blood Culture - Blood, Hand, Right [[id number]] Collected: 05/11/23 1654    Specimen: Blood from Hand, Right Updated: 05/11/23 1700    Urinalysis With Microscopic If Indicated (No Culture) - Urine, Clean Catch [[id number]]  (Abnormal) Collected: 05/11/23 1923    Specimen: Urine, Clean Catch Updated: 05/11/23 1946     Color, UA Yellow     Appearance, UA Clear     pH, UA 7.0     Specific Gravity, UA 1.010     Glucose,  mg/dL (Trace)     Ketones, UA Negative     Bilirubin, UA Negative     Blood, UA Trace     Protein, UA >=300 mg/dL (3+)     Leuk Esterase, UA Negative     Nitrite, UA Negative     Urobilinogen, UA 0.2 E.U./dL    Urinalysis, Microscopic Only - Urine, Clean Catch [[id number]]  (Abnormal) Collected: 05/11/23 1923    Specimen: Urine, Clean Catch Updated: 05/11/23 1950     RBC, UA 0-2 /HPF      WBC, UA 0-2 /HPF      Bacteria, UA Trace /HPF      Squamous Epithelial Cells, UA None Seen /HPF      Hyaline Casts, UA None Seen /LPF      Methodology Manual Light " Microscopy    POC Glucose Once [[id number]]  (Abnormal) Collected: 05/11/23 2054    Specimen: Blood Updated: 05/11/23 2057     Glucose 247 mg/dL      Comment: Serial Number: [id number]:  [id number]              CT Abdomen Pelvis Without Contrast    Result Date: 5/11/2023  PROCEDURE: CT CHEST ABDOMEN and PELVIS WO CONTRAST-  HISTORY: Right lower abdominal pain  COMPARISON: None.  PROCEDURE: . Axial images were obtained from the lung apex to the pubic symphysis by computed tomography. This study was performed with techniques to keep radiation doses as low as reasonably achievable, (ALARA). Individualized dose reduction techniques using automated exposure control or adjustment of mA and/or kV according to the patient size were employed.  FINDINGS:  CHEST: There is no axillary adenopathy. There is there is a single precarinal lymph node with short axis measuring 12 mm and measuring 22 mm long axis which is mildly prominent. Recommend 3 month follow-up to document stability or resolution. the heart size is enlarged. There is trace pericardial and bilateral pleural effusions. There is bilateral lower lobe associated airspace disease with component of atelectasis. Pneumonia is in the differential. Calcified granuloma identified. Upper lobes are clear.  ABDOMEN: The liver is homogenous with no focal abnormality. Gallbladder contains at least 2 gallstones. Consider gallbladder ultrasound for further evaluation. Vascular calcifications noted. The spleen is unremarkable. No adrenal mass is present.  The pancreas is unremarkable. The kidneys demonstrate mild, bilateral cortical thinning, otherwise kidneys appear normal. There is bilateral perirenal stranding. The aorta is normal in caliber. There is no free fluid or adenopathy. Significant, subcutaneous edema identified throughout the abdomen and pelvis.  PELVIS: The GI tract demonstrates no obstruction. The appendix is is not identified but no secondary signs of  appendicitis seen. The urinary bladder is mostly collapsed with no abnormality seen. Uterus is diminutive or absent. There is a mild amount of free fluid in the pelvis.      Impression: Cardiomegaly, bilateral pleural effusions, significant subcutaneous edema and minimal pelvic ascites; consider CHF and anasarca..  Bilateral lower lobe airspace disease, possible pneumonia.  Cholelithiasis, consider gallbladder ultrasound.  This report was signed and finalized on 5/11/2023 4:27 PM by Deyanira العراقي MD.    CT Chest Without Contrast Diagnostic    Result Date: 5/11/2023  PROCEDURE: CT CHEST ABDOMEN and PELVIS WO CONTRAST-  HISTORY: Right lower abdominal pain  COMPARISON: None.  PROCEDURE: . Axial images were obtained from the lung apex to the pubic symphysis by computed tomography. This study was performed with techniques to keep radiation doses as low as reasonably achievable, (ALARA). Individualized dose reduction techniques using automated exposure control or adjustment of mA and/or kV according to the patient size were employed.  FINDINGS:  CHEST: There is no axillary adenopathy. There is there is a single precarinal lymph node with short axis measuring 12 mm and measuring 22 mm long axis which is mildly prominent. Recommend 3 month follow-up to document stability or resolution. the heart size is enlarged. There is trace pericardial and bilateral pleural effusions. There is bilateral lower lobe associated airspace disease with component of atelectasis. Pneumonia is in the differential. Calcified granuloma identified. Upper lobes are clear.  ABDOMEN: The liver is homogenous with no focal abnormality. Gallbladder contains at least 2 gallstones. Consider gallbladder ultrasound for further evaluation. Vascular calcifications noted. The spleen is unremarkable. No adrenal mass is present.  The pancreas is unremarkable. The kidneys demonstrate mild, bilateral cortical thinning, otherwise kidneys appear normal. There is  bilateral perirenal stranding. The aorta is normal in caliber. There is no free fluid or adenopathy. Significant, subcutaneous edema identified throughout the abdomen and pelvis.  PELVIS: The GI tract demonstrates no obstruction. The appendix is is not identified but no secondary signs of appendicitis seen. The urinary bladder is mostly collapsed with no abnormality seen. Uterus is diminutive or absent. There is a mild amount of free fluid in the pelvis.      Impression: Cardiomegaly, bilateral pleural effusions, significant subcutaneous edema and minimal pelvic ascites; consider CHF and anasarca..  Bilateral lower lobe airspace disease, possible pneumonia.  Cholelithiasis, consider gallbladder ultrasound.  This report was signed and finalized on 5/11/2023 4:27 PM by Deyanira العراقي MD.    US Gallbladder    Result Date: 5/11/2023  FINAL REPORT TECHNIQUE: Sonographic images of the right upper quadrant were obtained. CLINICAL HISTORY: Cholelithiasis on CT, RUQ tenderness FINDINGS: The gallbladder is contracted and appears stone filled. There is no biliary ductal dilation.  The common bile duct is normal 4 mm in diameter.  The visualized liver and right kidney are normal. There is no ascites.     Impression: Gallstones.  Otherwise unremarkable. Authenticated and Electronically Signed by Pascual Palomo M.D. on 05/11/2023 08:07:31 PM         MDM    Initial impression of presenting illness: Patient is a 89-year-old female presenting to the ER for evaluation of abdominal pain.  On arrival she is stable    DDX: includes but is not limited to: Constipation, colitis, nephrolithiasis, urinary tract infection, mass or neoplasm, ileus, and other concerns.  She does tell me she is a bit short of breath today, denies any chest pain.  Could have some pulmonary edema or fluid overload.    Patient arrives in overall stable condition with vitals interpreted by myself.     Pertinent features from physical exam: Elderly appearing, nasal cannula  in place.  She is hypertensive, she has some tenderness in her lower abdomen without significant guarding, distal pulses are intact, patient does have 1+ pitting edema in bilateral lower extremitates.     Initial diagnostic plan: We will obtain basic labs, lipase, urinalysis, proBNP, chest x-ray, CT of the abdomen pelvis    Results from initial plan were reviewed and interpreted by me revealing leukocytosis of 16.17, hemoglobin 13.3.  Lactic acid and procalcitonin were not elevated.  Glucose was 224, no other significant electrolyte management noted.  proBNP was 10,958.  Urine had protein, blood and glucose but no obvious signs of urinary tract infection.  There is blunting of the costophrenic angle on chest x-ray, obtain CT of the chest and abdomen pelvis.  These were read by radiologist as cardiomegaly, bilateral pleural effusions, significant subcutaneous edema and minimal pelvic ascites, consider CHF and anasarca bilaterally possible pneumonia, cholelithiasis, elevated ultrasound.  Ultrasound was read by radiologist as gallstones without other findings    Diagnostic information from other sources: Chart review, patient's mother    Interventions / Re-evaluation: Patient was given Lasix as well as Nitropaste to help with blood pressure and fluid overload.  Was able to discuss with nursing care facility, patient has not been on any Diuretics since her admission in December although family member states that she used to be on 40 mg daily.  We did cover with antibiotics vancomycin and cefepime in case there is an underlying pneumonia.  Patient did have an episode of epistaxis to the right nare after she was wearing oxygen.  We initially used aerosolized TXA bilaterally with a nasal clamp.  This did help for some time and then patient started having a small amount of bleeding from that right nare again.  Discussed with Dr. Rodriguez and Briano Joellen was placed to help control bleeding.  Patient was also given hydralazine  10 mg IV for persistently elevated blood pressure.    Results/clinical rationale were discussed with the patient and family at bedside    Consultations/Discussion of results with other physicians: Discussed with Dr. Coleman, ER attending.  Discussed with hospitalist Dr. Lombardi who had recommended ultrasound and urinalysis.  Discussed with night hospitalist Dr. Cabrera who accepted patient for admission    Disposition plan: Discharge  -----    Final diagnoses:   Acute on chronic congestive heart failure, unspecified heart failure type   Bilateral pleural effusion   Hypertensive emergency   Epistaxis   Leukocytosis, unspecified type        Deyanira Bhatt PA-C  05/11/23 7830

## 2023-05-12 ENCOUNTER — APPOINTMENT (OUTPATIENT)
Dept: CARDIOLOGY | Facility: HOSPITAL | Age: 88
End: 2023-05-12
Payer: MEDICARE

## 2023-05-12 LAB
ANION GAP SERPL CALCULATED.3IONS-SCNC: 10.9 MMOL/L (ref 5–15)
BASOPHILS # BLD AUTO: 0.08 10*3/MM3 (ref 0–0.2)
BASOPHILS NFR BLD AUTO: 0.6 % (ref 0–1.5)
BH CV ECHO MEAS - AO MAX PG: 6.7 MMHG
BH CV ECHO MEAS - AO MEAN PG: 3 MMHG
BH CV ECHO MEAS - AO ROOT DIAM: 2.7 CM
BH CV ECHO MEAS - AO V2 MAX: 129 CM/SEC
BH CV ECHO MEAS - AO V2 VTI: 21.9 CM
BH CV ECHO MEAS - AVA(I,D): 1.93 CM2
BH CV ECHO MEAS - EDV(CUBED): 37.6 ML
BH CV ECHO MEAS - EDV(MOD-SP2): 47.5 ML
BH CV ECHO MEAS - EDV(MOD-SP4): 50.3 ML
BH CV ECHO MEAS - EF(MOD-BP): 66.5 %
BH CV ECHO MEAS - EF(MOD-SP2): 62.7 %
BH CV ECHO MEAS - EF(MOD-SP4): 69.4 %
BH CV ECHO MEAS - ESV(CUBED): 12 ML
BH CV ECHO MEAS - ESV(MOD-SP2): 17.7 ML
BH CV ECHO MEAS - ESV(MOD-SP4): 15.4 ML
BH CV ECHO MEAS - FS: 31.6 %
BH CV ECHO MEAS - IVS/LVPW: 0.8 CM
BH CV ECHO MEAS - IVSD: 1.7 CM
BH CV ECHO MEAS - LA DIMENSION: 3.9 CM
BH CV ECHO MEAS - LAT PEAK E' VEL: 8.5 CM/SEC
BH CV ECHO MEAS - LV MASS(C)D: 173.4 GRAMS
BH CV ECHO MEAS - LV MAX PG: 1.98 MMHG
BH CV ECHO MEAS - LV MEAN PG: 1 MMHG
BH CV ECHO MEAS - LV V1 MAX: 70.3 CM/SEC
BH CV ECHO MEAS - LV V1 VTI: 13.7 CM
BH CV ECHO MEAS - LVIDD: 3.4 CM
BH CV ECHO MEAS - LVIDS: 2.29 CM
BH CV ECHO MEAS - LVOT AREA: 3.1 CM2
BH CV ECHO MEAS - LVOT DIAM: 1.98 CM
BH CV ECHO MEAS - LVPWD: 1.6 CM
BH CV ECHO MEAS - MED PEAK E' VEL: 3.7 CM/SEC
BH CV ECHO MEAS - MV A MAX VEL: 60.8 CM/SEC
BH CV ECHO MEAS - MV DEC TIME: 0.13 MSEC
BH CV ECHO MEAS - MV E MAX VEL: 145 CM/SEC
BH CV ECHO MEAS - MV E/A: 2.38
BH CV ECHO MEAS - MV MAX PG: 7.2 MMHG
BH CV ECHO MEAS - MV MEAN PG: 3 MMHG
BH CV ECHO MEAS - MV V2 VTI: 29 CM
BH CV ECHO MEAS - MVA(VTI): 1.45 CM2
BH CV ECHO MEAS - PA ACC TIME: 0.11 SEC
BH CV ECHO MEAS - PA PR(ACCEL): 28.2 MMHG
BH CV ECHO MEAS - PA V2 MAX: 87.5 CM/SEC
BH CV ECHO MEAS - RAP SYSTOLE: 8 MMHG
BH CV ECHO MEAS - RV MAX PG: 2.06 MMHG
BH CV ECHO MEAS - RV V1 MAX: 71.8 CM/SEC
BH CV ECHO MEAS - RV V1 VTI: 14 CM
BH CV ECHO MEAS - RVSP: 57.3 MMHG
BH CV ECHO MEAS - SV(LVOT): 42.2 ML
BH CV ECHO MEAS - SV(MOD-SP2): 29.8 ML
BH CV ECHO MEAS - SV(MOD-SP4): 34.9 ML
BH CV ECHO MEAS - TR MAX PG: 49.3 MMHG
BH CV ECHO MEAS - TR MAX VEL: 351 CM/SEC
BH CV ECHO MEASUREMENTS AVERAGE E/E' RATIO: 23.77
BH CV XLRA - RV BASE: 3.4 CM
BH CV XLRA - RV LENGTH: 6.7 CM
BH CV XLRA - RV MID: 3.1 CM
BH CV XLRA - TDI S': 5.8 CM/SEC
BUN SERPL-MCNC: 16 MG/DL (ref 8–23)
BUN/CREAT SERPL: 23.2 (ref 7–25)
CALCIUM SPEC-SCNC: 8.9 MG/DL (ref 8.6–10.5)
CHLORIDE SERPL-SCNC: 94 MMOL/L (ref 98–107)
CO2 SERPL-SCNC: 31.1 MMOL/L (ref 22–29)
CREAT SERPL-MCNC: 0.69 MG/DL (ref 0.57–1)
DEPRECATED RDW RBC AUTO: 50.6 FL (ref 37–54)
EGFRCR SERPLBLD CKD-EPI 2021: 84.1 ML/MIN/1.73
EOSINOPHIL # BLD AUTO: 0.01 10*3/MM3 (ref 0–0.4)
EOSINOPHIL NFR BLD AUTO: 0.1 % (ref 0.3–6.2)
ERYTHROCYTE [DISTWIDTH] IN BLOOD BY AUTOMATED COUNT: 15.7 % (ref 12.3–15.4)
GLUCOSE BLDC GLUCOMTR-MCNC: 302 MG/DL (ref 70–130)
GLUCOSE BLDC GLUCOMTR-MCNC: 308 MG/DL (ref 70–130)
GLUCOSE BLDC GLUCOMTR-MCNC: 313 MG/DL (ref 70–130)
GLUCOSE SERPL-MCNC: 293 MG/DL (ref 65–99)
HBA1C MFR BLD: 9.1 % (ref 4.8–5.6)
HCT VFR BLD AUTO: 40 % (ref 34–46.6)
HGB BLD-MCNC: 12.8 G/DL (ref 12–15.9)
IMM GRANULOCYTES # BLD AUTO: 0.07 10*3/MM3 (ref 0–0.05)
IMM GRANULOCYTES NFR BLD AUTO: 0.5 % (ref 0–0.5)
LV EF 2D ECHO EST: 67 %
LYMPHOCYTES # BLD AUTO: 1.54 10*3/MM3 (ref 0.7–3.1)
LYMPHOCYTES NFR BLD AUTO: 11 % (ref 19.6–45.3)
MAXIMAL PREDICTED HEART RATE: 133 BPM
MCH RBC QN AUTO: 28.2 PG (ref 26.6–33)
MCHC RBC AUTO-ENTMCNC: 32 G/DL (ref 31.5–35.7)
MCV RBC AUTO: 88.1 FL (ref 79–97)
MONOCYTES # BLD AUTO: 0.76 10*3/MM3 (ref 0.1–0.9)
MONOCYTES NFR BLD AUTO: 5.4 % (ref 5–12)
NEUTROPHILS NFR BLD AUTO: 11.52 10*3/MM3 (ref 1.7–7)
NEUTROPHILS NFR BLD AUTO: 82.4 % (ref 42.7–76)
NRBC BLD AUTO-RTO: 0 /100 WBC (ref 0–0.2)
PLATELET # BLD AUTO: 234 10*3/MM3 (ref 140–450)
PMV BLD AUTO: 12.6 FL (ref 6–12)
POTASSIUM SERPL-SCNC: 3.6 MMOL/L (ref 3.5–5.2)
RBC # BLD AUTO: 4.54 10*6/MM3 (ref 3.77–5.28)
SODIUM SERPL-SCNC: 136 MMOL/L (ref 136–145)
STRESS TARGET HR: 113 BPM
WBC NRBC COR # BLD: 13.98 10*3/MM3 (ref 3.4–10.8)

## 2023-05-12 PROCEDURE — 83036 HEMOGLOBIN GLYCOSYLATED A1C: CPT | Performed by: INTERNAL MEDICINE

## 2023-05-12 PROCEDURE — 63710000001 INSULIN ASPART PER 5 UNITS: Performed by: INTERNAL MEDICINE

## 2023-05-12 PROCEDURE — 63710000001 INSULIN DETEMIR PER 5 UNITS: Performed by: INTERNAL MEDICINE

## 2023-05-12 PROCEDURE — 82948 REAGENT STRIP/BLOOD GLUCOSE: CPT

## 2023-05-12 PROCEDURE — 93306 TTE W/DOPPLER COMPLETE: CPT | Performed by: INTERNAL MEDICINE

## 2023-05-12 PROCEDURE — 25010000002 FUROSEMIDE PER 20 MG: Performed by: INTERNAL MEDICINE

## 2023-05-12 PROCEDURE — 99232 SBSQ HOSP IP/OBS MODERATE 35: CPT | Performed by: FAMILY MEDICINE

## 2023-05-12 PROCEDURE — 80048 BASIC METABOLIC PNL TOTAL CA: CPT | Performed by: INTERNAL MEDICINE

## 2023-05-12 PROCEDURE — 93306 TTE W/DOPPLER COMPLETE: CPT

## 2023-05-12 PROCEDURE — 85025 COMPLETE CBC W/AUTO DIFF WBC: CPT | Performed by: INTERNAL MEDICINE

## 2023-05-12 RX ORDER — DOCUSATE SODIUM 100 MG/1
100 CAPSULE, LIQUID FILLED ORAL 2 TIMES DAILY
Status: DISCONTINUED | OUTPATIENT
Start: 2023-05-12 | End: 2023-05-16 | Stop reason: HOSPADM

## 2023-05-12 RX ORDER — CLOPIDOGREL BISULFATE 75 MG/1
75 TABLET ORAL DAILY
COMMUNITY

## 2023-05-12 RX ORDER — CLOPIDOGREL BISULFATE 75 MG/1
75 TABLET ORAL DAILY
Status: DISCONTINUED | OUTPATIENT
Start: 2023-05-12 | End: 2023-05-16 | Stop reason: HOSPADM

## 2023-05-12 RX ORDER — LEVOTHYROXINE SODIUM 0.07 MG/1
75 TABLET ORAL DAILY
Status: DISCONTINUED | OUTPATIENT
Start: 2023-05-12 | End: 2023-05-16 | Stop reason: HOSPADM

## 2023-05-12 RX ORDER — SUCRALFATE 1 G/1
1 TABLET ORAL 4 TIMES DAILY
Status: DISCONTINUED | OUTPATIENT
Start: 2023-05-12 | End: 2023-05-16 | Stop reason: HOSPADM

## 2023-05-12 RX ORDER — HYDROCODONE BITARTRATE AND ACETAMINOPHEN 5; 325 MG/1; MG/1
1 TABLET ORAL EVERY 6 HOURS PRN
Status: DISCONTINUED | OUTPATIENT
Start: 2023-05-12 | End: 2023-05-16 | Stop reason: HOSPADM

## 2023-05-12 RX ORDER — ZINC OXIDE 16 %
OINTMENT (GRAM) TOPICAL
COMMUNITY

## 2023-05-12 RX ORDER — POTASSIUM CHLORIDE 1.5 G/1.77G
40 POWDER, FOR SOLUTION ORAL EVERY 4 HOURS
Status: DISPENSED | OUTPATIENT
Start: 2023-05-12 | End: 2023-05-12

## 2023-05-12 RX ORDER — PANTOPRAZOLE SODIUM 40 MG/1
40 TABLET, DELAYED RELEASE ORAL
Status: DISCONTINUED | OUTPATIENT
Start: 2023-05-12 | End: 2023-05-16 | Stop reason: HOSPADM

## 2023-05-12 RX ORDER — CHOLECALCIFEROL (VITAMIN D3) 125 MCG
5 CAPSULE ORAL NIGHTLY
Status: DISCONTINUED | OUTPATIENT
Start: 2023-05-12 | End: 2023-05-16 | Stop reason: HOSPADM

## 2023-05-12 RX ORDER — POLYETHYLENE GLYCOL 3350 17 G/17G
0.5 POWDER, FOR SOLUTION ORAL 2 TIMES DAILY
Status: DISCONTINUED | OUTPATIENT
Start: 2023-05-12 | End: 2023-05-12

## 2023-05-12 RX ORDER — LISINOPRIL 20 MG/1
20 TABLET ORAL DAILY
Status: DISCONTINUED | OUTPATIENT
Start: 2023-05-12 | End: 2023-05-13

## 2023-05-12 RX ORDER — LISINOPRIL 20 MG/1
20 TABLET ORAL DAILY
COMMUNITY

## 2023-05-12 RX ORDER — FUROSEMIDE 10 MG/ML
40 INJECTION INTRAMUSCULAR; INTRAVENOUS EVERY 12 HOURS
Status: DISCONTINUED | OUTPATIENT
Start: 2023-05-12 | End: 2023-05-16 | Stop reason: HOSPADM

## 2023-05-12 RX ORDER — ISOSORBIDE MONONITRATE 60 MG/1
60 TABLET, EXTENDED RELEASE ORAL DAILY
Status: DISCONTINUED | OUTPATIENT
Start: 2023-05-12 | End: 2023-05-16 | Stop reason: HOSPADM

## 2023-05-12 RX ORDER — IPRATROPIUM BROMIDE AND ALBUTEROL SULFATE 2.5; .5 MG/3ML; MG/3ML
3 SOLUTION RESPIRATORY (INHALATION) EVERY 6 HOURS PRN
Status: DISCONTINUED | OUTPATIENT
Start: 2023-05-12 | End: 2023-05-16 | Stop reason: HOSPADM

## 2023-05-12 RX ORDER — CHLORDIAZEPOXIDE HYDROCHLORIDE 5 MG/1
5 CAPSULE, GELATIN COATED ORAL 2 TIMES DAILY
Status: DISCONTINUED | OUTPATIENT
Start: 2023-05-12 | End: 2023-05-16 | Stop reason: HOSPADM

## 2023-05-12 RX ORDER — POTASSIUM CHLORIDE 20 MEQ/1
20 TABLET, EXTENDED RELEASE ORAL DAILY
COMMUNITY

## 2023-05-12 RX ORDER — NITROGLYCERIN 20 MG/100ML
5-200 INJECTION INTRAVENOUS
Status: DISCONTINUED | OUTPATIENT
Start: 2023-05-12 | End: 2023-05-13

## 2023-05-12 RX ORDER — MIRTAZAPINE 15 MG/1
15 TABLET, ORALLY DISINTEGRATING ORAL NIGHTLY
Status: DISCONTINUED | OUTPATIENT
Start: 2023-05-12 | End: 2023-05-16 | Stop reason: HOSPADM

## 2023-05-12 RX ORDER — INSULIN ASPART 100 [IU]/ML
INJECTION, SOLUTION INTRAVENOUS; SUBCUTANEOUS
COMMUNITY

## 2023-05-12 RX ORDER — LORAZEPAM 0.5 MG/1
0.5 TABLET ORAL EVERY 4 HOURS PRN
Status: DISCONTINUED | OUTPATIENT
Start: 2023-05-12 | End: 2023-05-16 | Stop reason: HOSPADM

## 2023-05-12 RX ADMIN — Medication 5 MG: at 01:41

## 2023-05-12 RX ADMIN — CLOPIDOGREL BISULFATE 75 MG: 75 TABLET ORAL at 09:06

## 2023-05-12 RX ADMIN — SERTRALINE 50 MG: 50 TABLET, FILM COATED ORAL at 09:06

## 2023-05-12 RX ADMIN — PANTOPRAZOLE SODIUM 40 MG: 40 TABLET, DELAYED RELEASE ORAL at 06:10

## 2023-05-12 RX ADMIN — NITROGLYCERIN 5 MCG/MIN: 20 INJECTION INTRAVENOUS at 01:16

## 2023-05-12 RX ADMIN — SENNOSIDES AND DOCUSATE SODIUM 2 TABLET: 50; 8.6 TABLET ORAL at 09:06

## 2023-05-12 RX ADMIN — MIRTAZAPINE 15 MG: 15 TABLET, ORALLY DISINTEGRATING ORAL at 01:41

## 2023-05-12 RX ADMIN — INSULIN ASPART 7 UNITS: 100 INJECTION, SOLUTION INTRAVENOUS; SUBCUTANEOUS at 06:10

## 2023-05-12 RX ADMIN — CHLORDIAZEPOXIDE HYDROCHLORIDE 5 MG: 5 CAPSULE ORAL at 09:59

## 2023-05-12 RX ADMIN — FUROSEMIDE 40 MG: 10 INJECTION, SOLUTION INTRAMUSCULAR; INTRAVENOUS at 15:15

## 2023-05-12 RX ADMIN — HYDROCODONE BITARTRATE AND ACETAMINOPHEN 1 TABLET: 5; 325 TABLET ORAL at 10:38

## 2023-05-12 RX ADMIN — ISOSORBIDE MONONITRATE 60 MG: 60 TABLET, EXTENDED RELEASE ORAL at 09:06

## 2023-05-12 RX ADMIN — NITROGLYCERIN 145 MCG/MIN: 20 INJECTION INTRAVENOUS at 13:39

## 2023-05-12 RX ADMIN — POTASSIUM CHLORIDE 40 MEQ: 1.5 POWDER, FOR SOLUTION ORAL at 15:15

## 2023-05-12 RX ADMIN — SUCRALFATE 1 G: 1 TABLET ORAL at 17:45

## 2023-05-12 RX ADMIN — CHLORDIAZEPOXIDE HYDROCHLORIDE 5 MG: 5 CAPSULE ORAL at 20:55

## 2023-05-12 RX ADMIN — CHLORDIAZEPOXIDE HYDROCHLORIDE 5 MG: 5 CAPSULE ORAL at 02:24

## 2023-05-12 RX ADMIN — Medication 5 MG: at 20:55

## 2023-05-12 RX ADMIN — FUROSEMIDE 40 MG: 10 INJECTION, SOLUTION INTRAMUSCULAR; INTRAVENOUS at 01:41

## 2023-05-12 RX ADMIN — APIXABAN 5 MG: 5 TABLET, FILM COATED ORAL at 01:41

## 2023-05-12 RX ADMIN — SUCRALFATE 1 G: 1 TABLET ORAL at 09:06

## 2023-05-12 RX ADMIN — INSULIN DETEMIR 10 UNITS: 100 INJECTION, SOLUTION SUBCUTANEOUS at 22:34

## 2023-05-12 RX ADMIN — APIXABAN 5 MG: 5 TABLET, FILM COATED ORAL at 20:55

## 2023-05-12 RX ADMIN — LEVOTHYROXINE SODIUM 75 MCG: 75 TABLET ORAL at 09:06

## 2023-05-12 RX ADMIN — DOCUSATE SODIUM 100 MG: 100 CAPSULE, LIQUID FILLED ORAL at 09:06

## 2023-05-12 RX ADMIN — ACETAMINOPHEN 650 MG: 325 TABLET, FILM COATED ORAL at 22:35

## 2023-05-12 RX ADMIN — MIRTAZAPINE 15 MG: 15 TABLET, ORALLY DISINTEGRATING ORAL at 20:55

## 2023-05-12 RX ADMIN — LISINOPRIL 20 MG: 20 TABLET ORAL at 09:06

## 2023-05-12 RX ADMIN — Medication 10 ML: at 01:41

## 2023-05-12 RX ADMIN — APIXABAN 5 MG: 5 TABLET, FILM COATED ORAL at 09:06

## 2023-05-12 RX ADMIN — Medication 10 ML: at 10:00

## 2023-05-12 RX ADMIN — INSULIN DETEMIR 10 UNITS: 100 INJECTION, SOLUTION SUBCUTANEOUS at 01:41

## 2023-05-12 RX ADMIN — INSULIN ASPART 7 UNITS: 100 INJECTION, SOLUTION INTRAVENOUS; SUBCUTANEOUS at 11:45

## 2023-05-12 RX ADMIN — INSULIN ASPART 7 UNITS: 100 INJECTION, SOLUTION INTRAVENOUS; SUBCUTANEOUS at 17:45

## 2023-05-12 RX ADMIN — SUCRALFATE 1 G: 1 TABLET ORAL at 11:59

## 2023-05-12 RX ADMIN — SUCRALFATE 1 G: 1 TABLET ORAL at 20:55

## 2023-05-12 NOTE — PLAN OF CARE
Goal Outcome Evaluation:            Patient on nitro drip, titratable, pain managed well with PO pain meds, rhino rocket removed from right nare by Dr Lombardi at bedside today, no s/s of nosebleed after removal, family at bedside, patient states feels much better today, bed bath today, external catheter in place, bm today x2, wctm

## 2023-05-12 NOTE — H&P
[facility] HOSPITALIST   HISTORY AND PHYSICAL      Name:  Breonna Gongora   Age:  87 y.o.  Sex:  female  :  1935  MRN:  [id number]   Visit Number:  [id number]  Admission Date:  2023  Date Of Service:  23  Primary Care Physician:  Provider, No Known    Chief Complaint:     Shortness of breath, swelling    History Of Presenting Illness:      Patient is a chronically ill 87-year-old female with history significant for type 2 diabetes, hypertension, CHF who presents to the ER from [facility] after complaining of shortness of breath.  Patient is on 2 L as needed and at night but has had to wear it more frequently and has also been complaining of abdominal pain and swelling.  Patient daughter at bedside and states that patient has had approximate 25-30 pound weight gain since December.  Also confirmed that patient was supposed to be on oral Lasix 40 mg daily but this medication was not on current medication list from [facility], suspect patient has not had her daily diuretic since December.  Patient denies chest pain, productive cough, nausea/vomiting or diarrhea.  Upon arrival to the ER patient afebrile hypertensive at 209/111 and on baseline oxygen at 2 L with saturation in the mid 90s.  Significant labs include BNP 10,958.  Glucose 224.  Lactate lipase procalcitonin normal.  WBC 16.  Urinalysis positive for glucose protein trace bacteria.  CT of the chest and A/P shows cardiomegaly bilateral pleural effusions, significant subcutaneous edema, anasarca.  It did show cholelithiasis for which gallbladder ultrasound obtained which confirmed gallstones but was otherwise unremarkable.  Patient did develop epistaxis in the emergency room, mild but Rhino Rocket placed.  Oxygen humidified.   Patient received hydralazine and nitro patch in the ER as well as Lasix 40 mg IV.  Hospitalist service asked to admit.    Review Of Systems:    All systems were reviewed and negative except as mentioned in history  of presenting illness, assessment and plan.    Past Medical History: Patient  has a past medical history of Allergic, Anemia, CHF (congestive heart failure), Depression, Hypothyroidism, and Stroke.    Past Surgical History: Patient  has a past surgical history that includes Total abdominal hysterectomy w/ bilateral salpingoophorectomy; Appendectomy; and Cataract extraction (N/A).    Social History: Patient  reports that she has never smoked. She has never used smokeless tobacco. She reports that she does not drink alcohol and does not use drugs.    Family History:  Patient's family history has been reviewed and found to be noncontributory.     Allergies:      Penicillins and Sulfa antibiotics    Home Medications:    Prior to Admission Medications     Prescriptions Last Dose Informant Patient Reported? Taking?    acetaminophen (Tylenol) 325 MG tablet   Yes No    Take 650 mg by mouth Every 4 (Four) Hours As Needed for Mild Pain.    apixaban (ELIQUIS) 5 MG tablet tablet   Yes No    Take 5 mg by mouth 2 (Two) Times a Day.    chlordiazePOXIDE (LIBRIUM) 5 MG capsule   No No    Take 1 capsule by mouth 2 (Two) Times a Day.    cholecalciferol (VITAMIN D3) 25 MCG (1000 UT) tablet   Yes No    Take 1,000 Units by mouth Daily.    Cyanocobalamin 1000 MCG/ML kit   Yes No    Inject 1 dose as directed Every 30 (Thirty) Days. On the 17th of each month    docusate sodium (COLACE) 100 MG capsule   Yes No    Take 100 mg by mouth 2 (Two) Times a Day.    fluticasone (FLONASE) 50 MCG/ACT nasal spray   Yes No    1 spray into the nostril(s) as directed by provider Daily.    HYDROcodone-acetaminophen (NORCO) 5-325 MG per tablet   No No    Take 1 tablet by mouth Every 6 (Six) Hours As Needed for Moderate Pain.    Insulin Lispro, 1 Unit Dial, (HumaLOG KwikPen) 100 UNIT/ML solution pen-injector   Yes No    Inject  under the skin into the appropriate area as directed. Sliding scale before meals and at hs    ipratropium-albuterol (DUO-NEB) 0.5-2.5  mg/3 ml nebulizer   Yes No    Take 3 mL by nebulization Every 6 (Six) Hours As Needed for Wheezing.    isosorbide mononitrate (IMDUR) 30 MG 24 hr tablet   Yes No    Take 60 mg by mouth Daily.    levothyroxine (SYNTHROID, LEVOTHROID) 75 MCG tablet   Yes No    Take 75 mcg by mouth Daily.    linagliptin (TRADJENTA) 5 MG tablet tablet   Yes No    Take 5 mg by mouth Daily.    loratadine (CLARITIN) 10 MG tablet   Yes No    Take 10 mg by mouth Daily.    LORazepam (ATIVAN) 0.5 MG tablet   No No    Take 1 tablet by mouth Every 4 (Four) Hours As Needed for Anxiety.    magnesium oxide (MAG-OX) 400 MG tablet   Yes No    Take 400 mg by mouth 2 (Two) Times a Week. TUES AND FRID    melatonin 5 MG tablet tablet   Yes No    Take 5 mg by mouth Every Night.    metoprolol succinate XL (TOPROL-XL) 50 MG 24 hr tablet   Yes No    Take 50 mg by mouth Daily.    mirtazapine (REMERON SOL-TAB) 15 MG disintegrating tablet   Yes No    Place 15 mg on the tongue Every Night.    nitroglycerin (Nitrostat) 0.4 MG SL tablet   Yes No    Place 0.4 mg under the tongue Every 5 (Five) Minutes As Needed for Chest Pain. Take no more than 3 doses in 15 minutes.    nystatin (MYCOSTATIN) 576703 UNIT/GM ointment   Yes No    Apply 1 application topically to the appropriate area as directed 3 (Three) Times a Day.    pantoprazole (Protonix) 40 MG pack packet   Yes No    Take 40 mg by mouth Every Morning Before Breakfast.    polyethylene glycol (MiraLax) 17 GM/SCOOP powder   Yes No    Take 17 g by mouth 2 (Two) Times a Day.    simethicone (MYLICON) 80 MG chewable tablet   Yes No    Chew 80 mg Every 6 (Six) Hours As Needed for Flatulence. 2 TABS PO PRN    sucralfate (CARAFATE) 1 g tablet   Yes No    Take 1 g by mouth 4 (Four) Times a Day.        ED Medications:    Medications   sodium chloride 0.9 % flush 10 mL (has no administration in time range)   sodium chloride 0.9 % flush 10 mL (has no administration in time range)   sodium chloride 0.9 % flush 10 mL (has no  administration in time range)   sodium chloride 0.9 % infusion 40 mL (has no administration in time range)   acetaminophen (TYLENOL) tablet 650 mg (has no administration in time range)     Or   acetaminophen (TYLENOL) 160 MG/5ML solution 650 mg (has no administration in time range)     Or   acetaminophen (TYLENOL) suppository 650 mg (has no administration in time range)   ondansetron (ZOFRAN) injection 4 mg (has no administration in time range)   Potassium Replacement - Follow Nurse / BPA Driven Protocol (has no administration in time range)   Magnesium Standard Dose Replacement - Follow Nurse / BPA Driven Protocol (has no administration in time range)   Phosphorus Replacement - Follow Nurse / BPA Driven Protocol (has no administration in time range)   Calcium Replacement - Follow Nurse / BPA Driven Protocol (has no administration in time range)   sennosides-docusate (PERICOLACE) 8.6-50 MG per tablet 2 tablet (has no administration in time range)     And   polyethylene glycol (MIRALAX) packet 17 g (has no administration in time range)     And   bisacodyl (DULCOLAX) EC tablet 5 mg (has no administration in time range)     And   bisacodyl (DULCOLAX) suppository 10 mg (has no administration in time range)   dextrose (GLUTOSE) oral gel 15 g (has no administration in time range)   dextrose (D50W) (25 g/50 mL) IV injection 25 g (has no administration in time range)   glucagon (GLUCAGEN) injection 1 mg (has no administration in time range)   Insulin Aspart (novoLOG) injection 0-9 Units (has no administration in time range)   insulin detemir (LEVEMIR) injection 10 Units (has no administration in time range)   furosemide (LASIX) injection 40 mg (40 mg Intravenous Given 5/11/23 1746)   nitroglycerin (NITROSTAT) ointment 1 inch (1 inch Topical Given 5/11/23 1746)   vancomycin 1500 mg/500 mL 0.9% NS IVPB ([facility]) (0 mg Intravenous Stopped 5/11/23 2154)   cefepime (MAXIPIME) IVPB 2 g/50ml D5W (premix) (0 g Intravenous Stopped  "5/11/23 1919)   tranexamic acid 500 MG/5ML nasal (ED/Crit Care for epistaxis) - VIAL DISPENSE 500 mg (500 mg Nasal Given 5/11/23 2018)   hydrALAZINE (APRESOLINE) injection 10 mg (10 mg Intravenous Given 5/11/23 2048)     Vital Signs:  Temp:  [98 °F (36.7 °C)] 98 °F (36.7 °C)  Heart Rate:  [67] 67  Resp:  [19] 19  BP: (174-209)/(102-114) 174/102        05/11/23  1505   Weight: 71.2 kg (157 lb)     Body mass index is 30.66 kg/m².    Physical Exam:     Most recent vital Signs: BP (!) 174/102   Pulse 67   Temp 98 °F (36.7 °C) (Oral)   Resp 19   Ht 152.4 cm (60\")   Wt 71.2 kg (157 lb)   SpO2 95%   BMI 30.66 kg/m²     Physical Exam  Vitals reviewed.   Constitutional:       General: She is not in acute distress.     Appearance: She is obese.   HENT:      Head: Normocephalic and atraumatic.      Right Ear: External ear normal.      Left Ear: External ear normal.      Nose:      Comments: Rhino Rocket in right nare with dried blood surrounding     Mouth/Throat:      Mouth: Mucous membranes are moist.      Pharynx: Oropharynx is clear.   Eyes:      Extraocular Movements: Extraocular movements intact.      Conjunctiva/sclera: Conjunctivae normal.   Cardiovascular:      Rate and Rhythm: Normal rate and regular rhythm.      Pulses: Normal pulses.      Heart sounds: Normal heart sounds.   Pulmonary:      Effort: No respiratory distress.      Breath sounds: Rales present.   Abdominal:      General: There is distension.      Palpations: Abdomen is soft.      Tenderness: There is no abdominal tenderness.      Comments: Positive anasarca   Musculoskeletal:      Right lower leg: Edema present.      Left lower leg: Edema present.   Skin:     General: Skin is warm and dry.   Neurological:      Mental Status: She is alert and oriented to person, place, and time. Mental status is at baseline.         Laboratory data:    I have reviewed the labs done in the emergency room.    Results from last 7 days   Lab Units 05/11/23  1523 "   SODIUM mmol/L 138   POTASSIUM mmol/L 4.3   CHLORIDE mmol/L 98   CO2 mmol/L 28.9   BUN mg/dL 16   CREATININE mg/dL 0.71   CALCIUM mg/dL 8.9   BILIRUBIN mg/dL 0.3   ALK PHOS U/L 72   ALT (SGPT) U/L <5   AST (SGOT) U/L 11   GLUCOSE mg/dL 224*     Results from last 7 days   Lab Units 05/11/23  1523   WBC 10*3/mm3 16.17*   HEMOGLOBIN g/dL 13.3   HEMATOCRIT % 42.3   PLATELETS 10*3/mm3 256             Results from last 7 days   Lab Units 05/11/23  1523   PROBNP pg/mL 10,958.0*         Results from last 7 days   Lab Units 05/11/23  1523   LIPASE U/L 16         Results from last 7 days   Lab Units 05/11/23  1923   COLOR UA  Yellow   GLUCOSE UA  100 mg/dL (Trace)*   KETONES UA  Negative   LEUKOCYTES UA  Negative   PH, URINE  7.0   BILIRUBIN UA  Negative   UROBILINOGEN UA  0.2 E.U./dL   RBC UA /HPF 0-2*   WBC UA /HPF 0-2*       Pain Management Panel          View : No data to display.                      EKG:          Radiology:    CT Abdomen Pelvis Without Contrast    Result Date: 5/11/2023  PROCEDURE: CT CHEST ABDOMEN and PELVIS WO CONTRAST-  HISTORY: Right lower abdominal pain  COMPARISON: None.  PROCEDURE: . Axial images were obtained from the lung apex to the pubic symphysis by computed tomography. This study was performed with techniques to keep radiation doses as low as reasonably achievable, (ALARA). Individualized dose reduction techniques using automated exposure control or adjustment of mA and/or kV according to the patient size were employed.  FINDINGS:  CHEST: There is no axillary adenopathy. There is there is a single precarinal lymph node with short axis measuring 12 mm and measuring 22 mm long axis which is mildly prominent. Recommend 3 month follow-up to document stability or resolution. the heart size is enlarged. There is trace pericardial and bilateral pleural effusions. There is bilateral lower lobe associated airspace disease with component of atelectasis. Pneumonia is in the differential. Calcified  granuloma identified. Upper lobes are clear.  ABDOMEN: The liver is homogenous with no focal abnormality. Gallbladder contains at least 2 gallstones. Consider gallbladder ultrasound for further evaluation. Vascular calcifications noted. The spleen is unremarkable. No adrenal mass is present.  The pancreas is unremarkable. The kidneys demonstrate mild, bilateral cortical thinning, otherwise kidneys appear normal. There is bilateral perirenal stranding. The aorta is normal in caliber. There is no free fluid or adenopathy. Significant, subcutaneous edema identified throughout the abdomen and pelvis.  PELVIS: The GI tract demonstrates no obstruction. The appendix is is not identified but no secondary signs of appendicitis seen. The urinary bladder is mostly collapsed with no abnormality seen. Uterus is diminutive or absent. There is a mild amount of free fluid in the pelvis.      Cardiomegaly, bilateral pleural effusions, significant subcutaneous edema and minimal pelvic ascites; consider CHF and anasarca..  Bilateral lower lobe airspace disease, possible pneumonia.  Cholelithiasis, consider gallbladder ultrasound.  This report was signed and finalized on 5/11/2023 4:27 PM by Deyanira العراقي MD.    CT Chest Without Contrast Diagnostic    Result Date: 5/11/2023  PROCEDURE: CT CHEST ABDOMEN and PELVIS WO CONTRAST-  HISTORY: Right lower abdominal pain  COMPARISON: None.  PROCEDURE: . Axial images were obtained from the lung apex to the pubic symphysis by computed tomography. This study was performed with techniques to keep radiation doses as low as reasonably achievable, (ALARA). Individualized dose reduction techniques using automated exposure control or adjustment of mA and/or kV according to the patient size were employed.  FINDINGS:  CHEST: There is no axillary adenopathy. There is there is a single precarinal lymph node with short axis measuring 12 mm and measuring 22 mm long axis which is mildly prominent. Recommend 3  month follow-up to document stability or resolution. the heart size is enlarged. There is trace pericardial and bilateral pleural effusions. There is bilateral lower lobe associated airspace disease with component of atelectasis. Pneumonia is in the differential. Calcified granuloma identified. Upper lobes are clear.  ABDOMEN: The liver is homogenous with no focal abnormality. Gallbladder contains at least 2 gallstones. Consider gallbladder ultrasound for further evaluation. Vascular calcifications noted. The spleen is unremarkable. No adrenal mass is present.  The pancreas is unremarkable. The kidneys demonstrate mild, bilateral cortical thinning, otherwise kidneys appear normal. There is bilateral perirenal stranding. The aorta is normal in caliber. There is no free fluid or adenopathy. Significant, subcutaneous edema identified throughout the abdomen and pelvis.  PELVIS: The GI tract demonstrates no obstruction. The appendix is is not identified but no secondary signs of appendicitis seen. The urinary bladder is mostly collapsed with no abnormality seen. Uterus is diminutive or absent. There is a mild amount of free fluid in the pelvis.      Cardiomegaly, bilateral pleural effusions, significant subcutaneous edema and minimal pelvic ascites; consider CHF and anasarca..  Bilateral lower lobe airspace disease, possible pneumonia.  Cholelithiasis, consider gallbladder ultrasound.  This report was signed and finalized on 5/11/2023 4:27 PM by Deyanira العراقي MD.    US Gallbladder    Result Date: 5/11/2023  FINAL REPORT TECHNIQUE: Sonographic images of the right upper quadrant were obtained. CLINICAL HISTORY: Cholelithiasis on CT, RUQ tenderness FINDINGS: The gallbladder is contracted and appears stone filled. There is no biliary ductal dilation.  The common bile duct is normal 4 mm in diameter.  The visualized liver and right kidney are normal. There is no ascites.     Gallstones.  Otherwise unremarkable. Authenticated  and Electronically Signed by Pascual Palomo M.D. on 05/11/2023 08:07:31 PM      Assessment:    1. Acute CHF exacerbation, POA  2. Hypertensive urgency, POA  3. Hyperglycemia, POA  4. Insulin-dependent type 2 diabetes  5. Paroxysmal A-fib on Eliquis  6. Anxiety/depression  7. Hypertension  8. Hypothyroidism  9. Obesity  10. Impaired mobility and ADLs    Plan:    We will admit patient to the hospital, anticipate greater than 2 midnight stay.    Acute CHF exacerbation  Hypertensive urgency  - Heart failure pathway initiated  - 2D echocardiogram pending  - Strict I's and O's, IV diuresis  - Nitro paste discontinued, started nitroglycerin drip  - Fluid restriction    Hyperglycemia/type 2 diabetes  - A1c pending  - Basal Levemir, Accu-Cheks ACHS and sliding scale insulin    Supportive care, further orders as clinical course dictates.  PT/OT.  Confirmed with daughter as well as nursing home paperwork that she is a DNR.    Risk Assessment: High  DVT Prophylaxis: Lovenox  Code Status: DNR  Diet: Cardiac/diabetic, fluid restriction    Advance Care Planning   ACP discussion was declined by the patient. Patient does not have an advance directive, declines further assistance.           Leonidas Cabrera DO  05/11/23  22:35 EDT    Dictated utilizing Dragon dictation.

## 2023-05-12 NOTE — NURSING NOTE
Seen for wound consult. [facility] lady. Chinchilla RN assisted with assessment and care.   Right heel red blanchable. Pillows obtained to assist with elevating heels off the bed. Dry skin and scattered bruising in various stages of healing.   Coccyx/sacral stage 2 pressure injury is red non blanchable, dry, and open.   Standing orders written prior to my assessment include a turning schedule, barrier cream twice daily and prn after cleansing, float heels off bed with pillows or heel lift boots, encourage increase mobility as appropriate and tolerated to reduce pressure, for dry skin apply lotion/cream and a nutrition consult for dietary needs. Staff to contact provider and re-consult wound nurse for new skin issues or lack of improvement with current recommendations. Thank you for the consult. If you have questions or concerns do not hesitate to contact me.

## 2023-05-12 NOTE — ED NOTES
[facility] overflow room 3 assigned. Nurse will call for report after getting the other 2 admits settled.

## 2023-05-12 NOTE — PROGRESS NOTES
[facility]    Name:  Breonna Gongora   Age:  87 y.o.  Sex:  female  :  1935  MRN:  [id number]   Visit Number:  [id number]  Admission Date:  2023  Date Of Service:  23  Primary Care Physician:  Provider, No Known     LOS: 1 day :    Chief Complaint:      Shortness of breath, swelling    Subjective:    Patient was seen and examined today at bedside.  Patient sitting up comfortably in bed with no distress.  Patient wanting her Rhino Rocket nasal packing removed as its bothering her.  Patient otherwise denies any other complaints.  Patient with no shortness of breath or chest pain.  Patient denies any abdominal pain.  No nausea or vomiting.  Nursing at bedside.  No other acute events overnight.  Vitals are stable on 4 L through nasal cannula.  Nasal packing was removed with no active bleeding currently.  We will continue to monitor closely.  Discussed with patient the possibility of needing packing again if she bleeds and would not stop due to being on Eliquis.    Hospital Course:    Patient is a chronically ill 87-year-old female with history significant for type 2 diabetes, hypertension, CHF who presents to the ER from [place] after complaining of shortness of breath.  Patient is on 2 L as needed and at night but has had to wear it more frequently and has also been complaining of abdominal pain and swelling.  Patient daughter at bedside and states that patient has had approximate 25-30 pound weight gain since December.  Also confirmed that patient was supposed to be on oral Lasix 40 mg daily but this medication was not on current medication list from [place], suspect patient has not had her daily diuretic since December.  Patient denies chest pain, productive cough, nausea/vomiting or diarrhea.  Upon arrival to the ER patient afebrile hypertensive at 209/111 and on baseline oxygen at 2 L with saturation in the mid 90s.  Significant labs include BNP  10,958.  Glucose 224.  Lactate lipase procalcitonin normal.  WBC 16.  Urinalysis positive for glucose protein trace bacteria.  CT of the chest and A/P shows cardiomegaly bilateral pleural effusions, significant subcutaneous edema, anasarca.  It did show cholelithiasis for which gallbladder ultrasound obtained which confirmed gallstones but was otherwise unremarkable.  Patient did develop epistaxis in the emergency room, mild but Rhino Rocket placed.  Oxygen humidified.   Patient received hydralazine and nitro patch in the ER as well as Lasix 40 mg IV.  Hospitalist service asked to admit.    Review of Systems:     All systems were reviewed and negative except as mentioned in subjective, assessment and plan.    Vital Signs:    Temp:  [97.4 °F (36.3 °C)-98 °F (36.7 °C)] 97.4 °F (36.3 °C)  Heart Rate:  [62-85] 74  Resp:  [16-18] 16  BP: (123-205)/() 123/78    Intake and output:    I/O last 3 completed shifts:  In: 50 [IV Piggyback:50]  Out: 1900 [Urine:1900]  I/O this shift:  In: 240 [P.O.:240]  Out: -     Physical Examination:    General Appearance:  Alert and cooperative.  Chronically ill-appearing.  Obese.  Patient is hard of hearing.   Head:  Atraumatic and normocephalic. right nare with dried blood.   Eyes: Conjunctivae and sclerae normal, no icterus. No pallor.   Throat: No oral lesions, no thrush, oral mucosa moist.   Neck: Supple, trachea midline, no thyromegaly.   Lungs:   Breath sounds heard bilaterally equally.  No wheezing.  Faint bilateral crackles heard. No Pleural rub or bronchial breathing.   Heart:  Normal S1 and S2, no murmur, no gallop, no rub. No JVD.   Abdomen:   Normal bowel sounds, no masses, no organomegaly. Soft, nontender, nondistended, no rebound tenderness.   Extremities: Supple, 1+ bilateral lower extremity edema, no cyanosis, no clubbing.   Skin: No bleeding or rash.   Neurologic: Alert and oriented x 3. No facial asymmetry. Moves all four limbs. No tremors.      Laboratory  results:    Results from last 7 days   Lab Units 05/12/23  0541 05/11/23  1523   SODIUM mmol/L 136 138   POTASSIUM mmol/L 3.6 4.3   CHLORIDE mmol/L 94* 98   CO2 mmol/L 31.1* 28.9   BUN mg/dL 16 16   CREATININE mg/dL 0.69 0.71   CALCIUM mg/dL 8.9 8.9   BILIRUBIN mg/dL  --  0.3   ALK PHOS U/L  --  72   ALT (SGPT) U/L  --  <5   AST (SGOT) U/L  --  11   GLUCOSE mg/dL 293* 224*     Results from last 7 days   Lab Units 05/12/23  0541 05/11/23  1523   WBC 10*3/mm3 13.98* 16.17*   HEMOGLOBIN g/dL 12.8 13.3   HEMATOCRIT % 40.0 42.3   PLATELETS 10*3/mm3 234 256                 No results for input(s): PHART, MNJ2LTB, PO2ART, ZHC7NKI, BASEEXCESS in the last 8760 hours.   I have reviewed the patient's laboratory results.    Radiology results:    Adult Transthoracic Echo Complete With Contrast if Necessary Per Protocol    Result Date: 5/12/2023  •  Left ventricular ejection fraction appears to be 66 - 70%. •  Left ventricular wall thickness is consistent with moderate concentric hypertrophy. •  Left ventricular diastolic function is consistent with (grade III w/high LAP) reversible restrictive pattern. •  The right ventricular cavity is mild to moderately dilated. •  The right atrial cavity is borderline dilated. •  There is moderate calcification of the aortic valve mainly affecting the non-coronary, left coronary and right coronary cusp(s). •  Estimated right ventricular systolic pressure from tricuspid regurgitation is markedly elevated (>55 mmHg). •  Moderate to severe pulmonary hypertension is present. •  There is a small (<1cm) pericardial effusion adjacent to the right atrium. There is no evidence of cardiac tamponade.     CT Abdomen Pelvis Without Contrast    Result Date: 5/11/2023  PROCEDURE: CT CHEST ABDOMEN and PELVIS WO CONTRAST-  HISTORY: Right lower abdominal pain  COMPARISON: None.  PROCEDURE: . Axial images were obtained from the lung apex to the pubic symphysis by computed tomography. This study was performed  with techniques to keep radiation doses as low as reasonably achievable, (ALARA). Individualized dose reduction techniques using automated exposure control or adjustment of mA and/or kV according to the patient size were employed.  FINDINGS:  CHEST: There is no axillary adenopathy. There is there is a single precarinal lymph node with short axis measuring 12 mm and measuring 22 mm long axis which is mildly prominent. Recommend 3 month follow-up to document stability or resolution. the heart size is enlarged. There is trace pericardial and bilateral pleural effusions. There is bilateral lower lobe associated airspace disease with component of atelectasis. Pneumonia is in the differential. Calcified granuloma identified. Upper lobes are clear.  ABDOMEN: The liver is homogenous with no focal abnormality. Gallbladder contains at least 2 gallstones. Consider gallbladder ultrasound for further evaluation. Vascular calcifications noted. The spleen is unremarkable. No adrenal mass is present.  The pancreas is unremarkable. The kidneys demonstrate mild, bilateral cortical thinning, otherwise kidneys appear normal. There is bilateral perirenal stranding. The aorta is normal in caliber. There is no free fluid or adenopathy. Significant, subcutaneous edema identified throughout the abdomen and pelvis.  PELVIS: The GI tract demonstrates no obstruction. The appendix is is not identified but no secondary signs of appendicitis seen. The urinary bladder is mostly collapsed with no abnormality seen. Uterus is diminutive or absent. There is a mild amount of free fluid in the pelvis.      Impression: Cardiomegaly, bilateral pleural effusions, significant subcutaneous edema and minimal pelvic ascites; consider CHF and anasarca..  Bilateral lower lobe airspace disease, possible pneumonia.  Cholelithiasis, consider gallbladder ultrasound.  This report was signed and finalized on 5/11/2023 4:27 PM by Deyanira العراقي MD.    CT Chest Without  Contrast Diagnostic    Result Date: 5/11/2023  PROCEDURE: CT CHEST ABDOMEN and PELVIS WO CONTRAST-  HISTORY: Right lower abdominal pain  COMPARISON: None.  PROCEDURE: . Axial images were obtained from the lung apex to the pubic symphysis by computed tomography. This study was performed with techniques to keep radiation doses as low as reasonably achievable, (ALARA). Individualized dose reduction techniques using automated exposure control or adjustment of mA and/or kV according to the patient size were employed.  FINDINGS:  CHEST: There is no axillary adenopathy. There is there is a single precarinal lymph node with short axis measuring 12 mm and measuring 22 mm long axis which is mildly prominent. Recommend 3 month follow-up to document stability or resolution. the heart size is enlarged. There is trace pericardial and bilateral pleural effusions. There is bilateral lower lobe associated airspace disease with component of atelectasis. Pneumonia is in the differential. Calcified granuloma identified. Upper lobes are clear.  ABDOMEN: The liver is homogenous with no focal abnormality. Gallbladder contains at least 2 gallstones. Consider gallbladder ultrasound for further evaluation. Vascular calcifications noted. The spleen is unremarkable. No adrenal mass is present.  The pancreas is unremarkable. The kidneys demonstrate mild, bilateral cortical thinning, otherwise kidneys appear normal. There is bilateral perirenal stranding. The aorta is normal in caliber. There is no free fluid or adenopathy. Significant, subcutaneous edema identified throughout the abdomen and pelvis.  PELVIS: The GI tract demonstrates no obstruction. The appendix is is not identified but no secondary signs of appendicitis seen. The urinary bladder is mostly collapsed with no abnormality seen. Uterus is diminutive or absent. There is a mild amount of free fluid in the pelvis.      Impression: Cardiomegaly, bilateral pleural effusions, significant  subcutaneous edema and minimal pelvic ascites; consider CHF and anasarca..  Bilateral lower lobe airspace disease, possible pneumonia.  Cholelithiasis, consider gallbladder ultrasound.  This report was signed and finalized on 5/11/2023 4:27 PM by Deyanira العراقي MD.    US Gallbladder    Result Date: 5/11/2023  FINAL REPORT TECHNIQUE: Sonographic images of the right upper quadrant were obtained. CLINICAL HISTORY: Cholelithiasis on CT, RUQ tenderness FINDINGS: The gallbladder is contracted and appears stone filled. There is no biliary ductal dilation.  The common bile duct is normal 4 mm in diameter.  The visualized liver and right kidney are normal. There is no ascites.     Impression: Gallstones.  Otherwise unremarkable. Authenticated and Electronically Signed by Pascual Palomo M.D. on 05/11/2023 08:07:31 PM    XR Chest 1 View    Result Date: 5/12/2023  PROCEDURE: XR CHEST 1 VW-    HISTORY: Lower extremity edema  COMPARISON: None.  FINDINGS: The heart is mildly enlarged. The mediastinum is unremarkable. There is a small left pleural effusion. There is left basilar atelectasis or infiltrate. Bilateral interstitial edema is consistent with CHF. There is no pneumothorax. There are no acute osseous abnormalities.      Impression: Small left pleural effusion.  Left basilar atelectasis or infiltrate.  Bilateral interstitial edema consistent with CHF. Continued follow-up is recommended.        Images were reviewed, interpreted, and dictated by Dr. Deyanira العراقي MD Transcribed by Farzaneh Calvillo PA-C.  This report was signed and finalized on 5/12/2023 9:18 AM by Deyanira العراقي MD.    I have reviewed the patient's radiology reports.    Medication Review:     I have reviewed the patient's active and prn medications.     Problem List:      Acute on chronic congestive heart failure, unspecified heart failure type      Assessment:    1. Acute CHF exacerbation, POA  2. Hypertensive urgency, POA  3. Hyperglycemia, POA  4. Insulin-dependent  type 2 diabetes  5. Paroxysmal A-fib on Eliquis  6. Anxiety/depression  7. Hypertension  8. Hypothyroidism  9. Obesity  10. Impaired mobility and ADLs    Plan:    Acute CHF exacerbation  Hypertensive urgency  - Heart failure pathway initiated  - 2D echo with EF of 66 to 70% and grade 3 diastolic dysfunction, moderate to severe pulmonary hypertension moderate calcification of aortic valve  - Strict I's and O's, IV diuresis  - Nitro paste discontinued, started nitroglycerin drip  - Fluid restriction  -Pro-Nehemiah negative, no indication for antibiotics, WBC trending down.     Hyperglycemia/type 2 diabetes  - A1c 9.1  - Basal Levemir, Accu-Cheks ACHS and sliding scale insulin     Supportive care, further orders as clinical course dictates.  PT/OT.     DVT Prophylaxis: Lovenox  Code Status: DNR  Diet: Cardiac/diabetic, fluid restriction  Discharge Plan: To be determined, likely back to nursing home in 1 to 2 days.    Jasmeet Lombardi MD  05/12/23  16:35 EDT    Dictated utilizing Dragon dictation.

## 2023-05-12 NOTE — CASE MANAGEMENT/SOCIAL WORK
Discharge Planning Assessment  [facility]     Patient Name: Breonna Gongora  MRN: [id number]  Today's Date: 5/12/2023    Admit Date: 5/11/2023    Plan: DCP   Discharge Needs Assessment     Row Name 05/12/23 1233       Living Environment    People in Home alone    Current Living Arrangements residential facility    Duration at Residence [facility] resident    Potentially Unsafe Housing Conditions none    Primary Care Provided by other (see comments)    Provides Primary Care For no one    Family Caregiver if Needed other (see comments)    Quality of Family Relationships supportive;involved;helpful    Able to Return to Prior Arrangements yes       Resource/Environmental Concerns    Resource/Environmental Concerns none    Transportation Concerns none       Transition Planning    Patient/Family Anticipates Transition to long-term care facility    Patient/Family Anticipated Services at Transition none    Transportation Anticipated family or friend will provide;other (see comments)       Discharge Needs Assessment    Readmission Within the Last 30 Days no previous admission in last 30 days    Equipment Currently Used at Home other (see comments)    Concerns to be Addressed no discharge needs identified    Anticipated Changes Related to Illness none    Equipment Needed After Discharge none    Discharge Facility/Level of Care Needs nursing facility, intermediate    Provided Post Acute Provider List? N/A    Provided Post Acute Provider Quality & Resource List? N/A               Discharge Plan     Row Name 05/12/23 1234       Plan    Plan DCP    Patient/Family in Agreement with Plan yes    Provided Post Acute Provider List? N/A    Provided Post Acute Provider Quality & Resource List? N/A    Plan Comments JEROD spoke with Pt. daughter Laura  on the phone to complete DCP. Pt. is currently a LTC resident at [place]. Pt. will return to LTC at [place] at discharge.    Final Discharge Disposition Code 04 - [facility] care facility               Continued Care and Services - Admitted Since 5/11/2023    Coordination has not been started for this encounter.          Demographic Summary     Row Name 05/12/23 1231       General Information    Admission Type inpatient    Arrived From [facility]    Required Notices Provided Important Message from Medicare    Referral Source admission list    Reason for Consult discharge planning    Preferred Language English       Contact Information    Permission Granted to Share Info With     Contact Information Obtained for                Functional Status     Row Name 05/12/23 1231       Functional Status    Usual Activity Tolerance moderate    Current Activity Tolerance moderate       Physical Activity    On average, how many days per week do you engage in moderate to strenuous exercise (like a brisk walk)? 0 days    On average, how many minutes do you engage in exercise at this level? 0 min    Number of minutes of exercise per week 0       Assessment of Health Literacy    How often do you have someone help you read hospital materials? Always    How often do you have problems learning about your medical condition because of difficulty understanding written information? Always    How often do you have a problem understanding what is told to you about your medical condition? Always    How confident are you filling out medical forms by yourself? A little bit    Health Literacy Fair       Functional Status, IADL    Medications completely dependent    Meal Preparation completely dependent    Housekeeping completely dependent    Laundry completely dependent    Shopping completely dependent       Mental Status Summary    Recent Changes in Mental Status/Cognitive Functioning no changes       Employment/    Employment Status retired    Current or Previous Occupation not applicable               Psychosocial     Row Name 05/12/23 1232       Values/Beliefs    Spiritual, Cultural Beliefs,  Jain Practices, Values that Affect Care no       Coping/Stress    Major Change/Loss/Stressor illness    Patient Personal Strengths resilient    Sources of Support adult child(anand)    Techniques to [place] with Loss/Stress/Change not applicable    Reaction to Health Status realistic    Understanding of Condition and Treatment partial understanding of medical condition       Developmental Stage (Eriksson's)    Developmental Stage Stage 8 (65 years-death/Late Adulthood) Integrity vs. Despair       C-SSRS (Recent)    Q1 Wished to be Dead (Past Month) no    Q2 Suicidal Thoughts (Past Month) no    Q6 Suicide Behavior (Lifetime) no       Violence Risk    Feels Like Hurting Others no    Previous Attempt to Harm Others no               Abuse/Neglect     Row Name 05/12/23 1232       Personal Safety    Feels Unsafe at Home or Work/School no    Feels Threatened by Someone no    Does Anyone Try to Keep You From Having Contact with Others or Doing Things Outside Your Home? no    Physical Signs of Abuse Present no               Legal    No documentation.                Substance Abuse    No documentation.                Patient Forms    No documentation.                   Yehuda Millan

## 2023-05-12 NOTE — PROGRESS NOTES
"Adult Nutrition  Assessment/PES    Patient Name:  Breonna Gongora  YOB: 1935  MRN: [id number]  Admit Date:  5/11/2023    Assessment Date:  5/12/2023    Comments:    Pt with PMHx significant for T2DM, HTN, CHF presented to [facility] 5/11 with c/o SOB, swelling.  lbs, with a reported 25-30 # weight gain since December of last year. Glucose 313 mg/dL, A1c 9.1%. Currently receiving a cardiac, diabetic diet with a 1,500 mL fluid restriction. No PO intakes yet reported. Will order HS snack and will F/U. Available PRN.      Reason for Assessment     Row Name 05/12/23 1220          Reason for Assessment    Reason For Assessment diagnosis/disease state     Diagnosis diabetes diagnosis/complications                  Labs/Tests/Procedures/Meds     Row Name 05/12/23 1220          Labs/Procedures/Meds    Lab Results Reviewed reviewed, pertinent     Lab Results Comments High: Glucose, A1c        Medications    Pertinent Medications Reviewed reviewed, pertinent     Pertinent Medications Comments insulin, lasix, synthroid, remeron                Physical Findings     Row Name 05/12/23 1221          Physical Findings    Overall Physical Appearance obese                Estimated/Assessed Needs - Anthropometrics     Row Name 05/12/23 1222 05/12/23 1000       Anthropometrics    Height -- 152.4 cm (60\")    Weight -- 80.2 kg (176 lb 12.9 oz)    Height for Calculation 1.524 m (5') --    Weight for Calculation 80 kg (176 lb 5.9 oz)  CBW --       Estimated/Assessed Needs    Additional Documentation Fluid Requirements (Group);KCAL/KG (Group);Estimated Calorie Needs (Group);Protein Requirements (Group) --       Estimated Calorie Needs    Estimated Calorie Requirement (kcal/day) 1,600 --    Estimated Calorie Need Method kcal/kg --       KCAL/KG    KCAL/KG 20 Kcal/Kg (kcal) --    20 Kcal/Kg (kcal) 1600 --       Protein Requirements    Weight Used For Protein Calculations 80 kg (176 lb 5.9 oz)  CBW --    Est Protein Requirement " Amount (gms/kg) 1.0 gm protein --    Estimated Protein Requirements (gms/day) 80 --       Fluid Requirements    Fluid Requirements (mL/day) 1600  1mL/kcal --    RDA Method (mL) 1600 --    Row Name 05/12/23 0441          Anthropometrics    Weight 80.2 kg (176 lb 12.9 oz)                Nutrition Prescription Ordered     Row Name 05/12/23 1224          Nutrition Prescription PO    Current PO Diet Regular     Fluid Consistency Thin     Common Modifiers Cardiac;Consistent Carbohydrate;Fluid Restriction     Fluid Restriction mL per Day 1500 mL                Evaluation of Received Nutrient/Fluid Intake     Row Name 05/12/23 1224          PO Evaluation    Number of Days PO Intake Evaluated Insufficient Data                   Problem/Interventions:   Problem 1     Row Name 05/12/23 1224          Nutrition Diagnoses Problem 1    Problem 1 Altered Nutrition Related to Labs     Etiology (related to) Medical Diagnosis     Endocrine DM     Signs/Symptoms (evidenced by) Biochemical     Specific Labs Noted HgbA1C;Glucose                      Intervention Goal     Row Name 05/12/23 1225          Intervention Goal    General Maintain nutrition;Meet nutritional needs for age/condition;Disease management/therapy;Improved nutrition related lab(s)     PO Establish PO;Tolerate PO;Meet estimated needs     Weight Maintain weight                Nutrition Intervention     Row Name 05/12/23 1225          Nutrition Intervention    RD/Tech Action Follow Tx progress;Encourage intake;Care plan reviewd;Recommend/ordered     Recommended/Ordered Snack                Nutrition Prescription     Row Name 05/12/23 1225          Other Orders    Obtain Weight Daily     Obtain Weight Ordered? No, recommended     Supplement Vitamin mineral supplement     Supplement Ordered? No, recommended     Labs K+;Phos;Mg++;Na+     Labs Ordered? No, recommended                Education/Evaluation     Row Name 05/12/23 1226          Education    Education Will Instruct  as appropriate        Monitor/Evaluation    Monitor Per protocol;PO intake;Weight;Skin status;Symptoms;Pertinent labs                 Electronically signed by:  Elicia Cardenas RD  05/12/23 12:26 EDT

## 2023-05-13 LAB
ANION GAP SERPL CALCULATED.3IONS-SCNC: 8.5 MMOL/L (ref 5–15)
BUN SERPL-MCNC: 17 MG/DL (ref 8–23)
BUN/CREAT SERPL: 23.6 (ref 7–25)
CALCIUM SPEC-SCNC: 8.9 MG/DL (ref 8.6–10.5)
CHLORIDE SERPL-SCNC: 93 MMOL/L (ref 98–107)
CO2 SERPL-SCNC: 34.5 MMOL/L (ref 22–29)
CREAT SERPL-MCNC: 0.72 MG/DL (ref 0.57–1)
DEPRECATED RDW RBC AUTO: 50.7 FL (ref 37–54)
EGFRCR SERPLBLD CKD-EPI 2021: 81 ML/MIN/1.73
ERYTHROCYTE [DISTWIDTH] IN BLOOD BY AUTOMATED COUNT: 15.4 % (ref 12.3–15.4)
GLUCOSE BLDC GLUCOMTR-MCNC: 253 MG/DL (ref 70–130)
GLUCOSE BLDC GLUCOMTR-MCNC: 283 MG/DL (ref 70–130)
GLUCOSE BLDC GLUCOMTR-MCNC: 301 MG/DL (ref 70–130)
GLUCOSE SERPL-MCNC: 287 MG/DL (ref 65–99)
HCT VFR BLD AUTO: 35.4 % (ref 34–46.6)
HGB BLD-MCNC: 11.1 G/DL (ref 12–15.9)
MAGNESIUM SERPL-MCNC: 1.3 MG/DL (ref 1.6–2.4)
MCH RBC QN AUTO: 27.8 PG (ref 26.6–33)
MCHC RBC AUTO-ENTMCNC: 31.4 G/DL (ref 31.5–35.7)
MCV RBC AUTO: 88.7 FL (ref 79–97)
PLATELET # BLD AUTO: 224 10*3/MM3 (ref 140–450)
PMV BLD AUTO: 12.3 FL (ref 6–12)
POTASSIUM SERPL-SCNC: 3.2 MMOL/L (ref 3.5–5.2)
POTASSIUM SERPL-SCNC: 4.1 MMOL/L (ref 3.5–5.2)
RBC # BLD AUTO: 3.99 10*6/MM3 (ref 3.77–5.28)
SODIUM SERPL-SCNC: 136 MMOL/L (ref 136–145)
WBC NRBC COR # BLD: 13.03 10*3/MM3 (ref 3.4–10.8)

## 2023-05-13 PROCEDURE — 25010000002 FUROSEMIDE PER 20 MG: Performed by: INTERNAL MEDICINE

## 2023-05-13 PROCEDURE — 25010000002 ONDANSETRON PER 1 MG: Performed by: INTERNAL MEDICINE

## 2023-05-13 PROCEDURE — 25010000002 HYDRALAZINE PER 20 MG: Performed by: FAMILY MEDICINE

## 2023-05-13 PROCEDURE — 80048 BASIC METABOLIC PNL TOTAL CA: CPT | Performed by: FAMILY MEDICINE

## 2023-05-13 PROCEDURE — 99232 SBSQ HOSP IP/OBS MODERATE 35: CPT | Performed by: FAMILY MEDICINE

## 2023-05-13 PROCEDURE — 63710000001 INSULIN ASPART PER 5 UNITS: Performed by: INTERNAL MEDICINE

## 2023-05-13 PROCEDURE — 85027 COMPLETE CBC AUTOMATED: CPT | Performed by: FAMILY MEDICINE

## 2023-05-13 PROCEDURE — 25010000002 MAGNESIUM SULFATE 2 GM/50ML SOLUTION: Performed by: FAMILY MEDICINE

## 2023-05-13 PROCEDURE — 63710000001 INSULIN DETEMIR PER 5 UNITS: Performed by: FAMILY MEDICINE

## 2023-05-13 PROCEDURE — 82948 REAGENT STRIP/BLOOD GLUCOSE: CPT

## 2023-05-13 PROCEDURE — 84132 ASSAY OF SERUM POTASSIUM: CPT | Performed by: FAMILY MEDICINE

## 2023-05-13 PROCEDURE — 83735 ASSAY OF MAGNESIUM: CPT | Performed by: FAMILY MEDICINE

## 2023-05-13 RX ORDER — HYDRALAZINE HYDROCHLORIDE 20 MG/ML
10 INJECTION INTRAMUSCULAR; INTRAVENOUS EVERY 6 HOURS PRN
Status: DISCONTINUED | OUTPATIENT
Start: 2023-05-13 | End: 2023-05-16 | Stop reason: HOSPADM

## 2023-05-13 RX ORDER — AMLODIPINE BESYLATE 5 MG/1
5 TABLET ORAL
Status: DISCONTINUED | OUTPATIENT
Start: 2023-05-13 | End: 2023-05-13

## 2023-05-13 RX ORDER — AMLODIPINE BESYLATE 5 MG/1
5 TABLET ORAL ONCE
Status: COMPLETED | OUTPATIENT
Start: 2023-05-13 | End: 2023-05-13

## 2023-05-13 RX ORDER — LISINOPRIL 20 MG/1
20 TABLET ORAL ONCE
Status: COMPLETED | OUTPATIENT
Start: 2023-05-13 | End: 2023-05-13

## 2023-05-13 RX ORDER — LISINOPRIL 20 MG/1
40 TABLET ORAL DAILY
Status: DISCONTINUED | OUTPATIENT
Start: 2023-05-14 | End: 2023-05-16 | Stop reason: HOSPADM

## 2023-05-13 RX ORDER — AMLODIPINE BESYLATE 5 MG/1
10 TABLET ORAL
Status: DISCONTINUED | OUTPATIENT
Start: 2023-05-14 | End: 2023-05-16 | Stop reason: HOSPADM

## 2023-05-13 RX ORDER — MAGNESIUM SULFATE HEPTAHYDRATE 40 MG/ML
2 INJECTION, SOLUTION INTRAVENOUS
Status: COMPLETED | OUTPATIENT
Start: 2023-05-13 | End: 2023-05-13

## 2023-05-13 RX ORDER — POTASSIUM CHLORIDE 1.5 G/1.77G
40 POWDER, FOR SOLUTION ORAL EVERY 4 HOURS
Status: COMPLETED | OUTPATIENT
Start: 2023-05-13 | End: 2023-05-13

## 2023-05-13 RX ADMIN — SUCRALFATE 1 G: 1 TABLET ORAL at 20:51

## 2023-05-13 RX ADMIN — CHLORDIAZEPOXIDE HYDROCHLORIDE 5 MG: 5 CAPSULE ORAL at 20:51

## 2023-05-13 RX ADMIN — Medication 5 DROP: at 20:52

## 2023-05-13 RX ADMIN — APIXABAN 5 MG: 5 TABLET, FILM COATED ORAL at 08:34

## 2023-05-13 RX ADMIN — SERTRALINE 50 MG: 50 TABLET, FILM COATED ORAL at 08:20

## 2023-05-13 RX ADMIN — SUCRALFATE 1 G: 1 TABLET ORAL at 17:06

## 2023-05-13 RX ADMIN — LISINOPRIL 20 MG: 20 TABLET ORAL at 13:03

## 2023-05-13 RX ADMIN — Medication 10 ML: at 20:52

## 2023-05-13 RX ADMIN — AMLODIPINE BESYLATE 5 MG: 5 TABLET ORAL at 13:33

## 2023-05-13 RX ADMIN — POTASSIUM CHLORIDE 40 MEQ: 1.5 POWDER, FOR SOLUTION ORAL at 12:11

## 2023-05-13 RX ADMIN — HYDRALAZINE HYDROCHLORIDE 10 MG: 20 INJECTION INTRAMUSCULAR; INTRAVENOUS at 18:23

## 2023-05-13 RX ADMIN — MAGNESIUM SULFATE HEPTAHYDRATE 2 G: 40 INJECTION, SOLUTION INTRAVENOUS at 11:02

## 2023-05-13 RX ADMIN — Medication 5 MG: at 20:51

## 2023-05-13 RX ADMIN — CLOPIDOGREL BISULFATE 75 MG: 75 TABLET ORAL at 08:20

## 2023-05-13 RX ADMIN — ACETAMINOPHEN 650 MG: 325 TABLET, FILM COATED ORAL at 21:08

## 2023-05-13 RX ADMIN — FUROSEMIDE 40 MG: 10 INJECTION, SOLUTION INTRAMUSCULAR; INTRAVENOUS at 13:33

## 2023-05-13 RX ADMIN — SENNOSIDES AND DOCUSATE SODIUM 2 TABLET: 50; 8.6 TABLET ORAL at 08:20

## 2023-05-13 RX ADMIN — INSULIN ASPART 6 UNITS: 100 INJECTION, SOLUTION INTRAVENOUS; SUBCUTANEOUS at 12:11

## 2023-05-13 RX ADMIN — INSULIN ASPART 6 UNITS: 100 INJECTION, SOLUTION INTRAVENOUS; SUBCUTANEOUS at 08:21

## 2023-05-13 RX ADMIN — MAGNESIUM SULFATE HEPTAHYDRATE 2 G: 40 INJECTION, SOLUTION INTRAVENOUS at 12:47

## 2023-05-13 RX ADMIN — INSULIN ASPART 7 UNITS: 100 INJECTION, SOLUTION INTRAVENOUS; SUBCUTANEOUS at 17:06

## 2023-05-13 RX ADMIN — CHLORDIAZEPOXIDE HYDROCHLORIDE 5 MG: 5 CAPSULE ORAL at 08:22

## 2023-05-13 RX ADMIN — LEVOTHYROXINE SODIUM 75 MCG: 75 TABLET ORAL at 08:20

## 2023-05-13 RX ADMIN — FUROSEMIDE 40 MG: 10 INJECTION, SOLUTION INTRAMUSCULAR; INTRAVENOUS at 02:54

## 2023-05-13 RX ADMIN — SUCRALFATE 1 G: 1 TABLET ORAL at 12:11

## 2023-05-13 RX ADMIN — ISOSORBIDE MONONITRATE 60 MG: 60 TABLET, EXTENDED RELEASE ORAL at 08:20

## 2023-05-13 RX ADMIN — MAGNESIUM SULFATE HEPTAHYDRATE 2 G: 40 INJECTION, SOLUTION INTRAVENOUS at 09:55

## 2023-05-13 RX ADMIN — LISINOPRIL 20 MG: 20 TABLET ORAL at 08:21

## 2023-05-13 RX ADMIN — ONDANSETRON 4 MG: 2 INJECTION INTRAMUSCULAR; INTRAVENOUS at 21:08

## 2023-05-13 RX ADMIN — LORAZEPAM 0.5 MG: 0.5 TABLET ORAL at 20:52

## 2023-05-13 RX ADMIN — INSULIN DETEMIR 20 UNITS: 100 INJECTION, SOLUTION SUBCUTANEOUS at 22:42

## 2023-05-13 RX ADMIN — POTASSIUM CHLORIDE 40 MEQ: 1.5 POWDER, FOR SOLUTION ORAL at 08:20

## 2023-05-13 RX ADMIN — MIRTAZAPINE 15 MG: 15 TABLET, ORALLY DISINTEGRATING ORAL at 20:52

## 2023-05-13 RX ADMIN — HYDROCODONE BITARTRATE AND ACETAMINOPHEN 1 TABLET: 5; 325 TABLET ORAL at 18:23

## 2023-05-13 RX ADMIN — SUCRALFATE 1 G: 1 TABLET ORAL at 08:20

## 2023-05-13 RX ADMIN — Medication 10 ML: at 08:25

## 2023-05-13 RX ADMIN — APIXABAN 5 MG: 5 TABLET, FILM COATED ORAL at 20:51

## 2023-05-13 RX ADMIN — Medication 5 DROP: at 14:06

## 2023-05-13 RX ADMIN — AMLODIPINE BESYLATE 5 MG: 5 TABLET ORAL at 12:11

## 2023-05-13 NOTE — PROGRESS NOTES
[facility]    PROGRESS NOTE    Name:  Breonna Gongora   Age:  87 y.o.  Sex:  female  :  1935  MRN:  [id number]   Visit Number:  [id number]  Admission Date:  2023  Date Of Service:  23  Primary Care Physician:  Provider, No Known     LOS: 2 days :    Chief Complaint:      Shortness of breath, swelling    Subjective:    Patient was seen and examined today at bedside.  Patient sitting up comfortably in bed with no distress.  Family of daughter and son-in-law at bedside.  Patient denies any pain or discomfort other than feeling clogged in her ears.  Patient was prescribed Debrox at the nursing home prior to arrival however did not continue with due to coming to the hospital.  I examined her ears with otoscope and had cerumen impaction bilaterally.  Will prescribe Debrox.  Patient otherwise denies any acute complaints today.  Patient reports improvement overall and feeling much better.  Discussed with her daughter at bedside about management and plan.  All questions were answered to their satisfaction.  Patient on nitro drip, blood pressure has been little better controlled.  On 5 L nasal cannula otherwise afebrile.  Discussed with nursing at bedside.    Hospital Course:    Patient is a chronically ill 87-year-old female with history significant for type 2 diabetes, hypertension, CHF who presents to the ER from [facility] after complaining of shortness of breath.  Patient is on 2 L as needed and at night but has had to wear it more frequently and has also been complaining of abdominal pain and swelling.  Patient daughter at bedside and states that patient has had approximate 25-30 pound weight gain since December.  Also confirmed that patient was supposed to be on oral Lasix 40 mg daily but this medication was not on current medication list from [facility], suspect patient has not had her daily diuretic since December.  Patient denies chest pain, productive cough, nausea/vomiting  or diarrhea.  Upon arrival to the ER patient afebrile hypertensive at 209/111 and on baseline oxygen at 2 L with saturation in the mid 90s.  Significant labs include BNP 10,958.  Glucose 224.  Lactate lipase procalcitonin normal.  WBC 16.  Urinalysis positive for glucose protein trace bacteria.  CT of the chest and A/P shows cardiomegaly bilateral pleural effusions, significant subcutaneous edema, anasarca.  It did show cholelithiasis for which gallbladder ultrasound obtained which confirmed gallstones but was otherwise unremarkable.  Patient did develop epistaxis in the emergency room, mild but Rhino Rocket placed.  Oxygen humidified.   Patient received hydralazine and nitro patch in the ER as well as Lasix 40 mg IV.  Hospitalist service asked to admit.    Review of Systems:     All systems were reviewed and negative except as mentioned in subjective, assessment and plan.    Vital Signs:    Temp:  [96.2 °F (35.7 °C)-98 °F (36.7 °C)] 98 °F (36.7 °C)  Heart Rate:  [70-92] 91  Resp:  [16-18] 18  BP: (123-192)/() 143/94    Intake and output:    I/O last 3 completed shifts:  In: 2374.6 [P.O.:1200; I.V.:1124.6; IV Piggyback:50]  Out: 3600 [Urine:3600]  I/O this shift:  In: 360 [P.O.:360]  Out: -     Physical Examination:    General Appearance:  Alert and cooperative.  Chronically ill-appearing.  Obese.  Patient is hard of hearing.   Head:  Atraumatic and normocephalic. right nare with dried blood.   Eyes: Conjunctivae and sclerae normal, no icterus. No pallor.   Throat: No oral lesions, no thrush, oral mucosa moist.   Neck: Supple, trachea midline, no thyromegaly.   Lungs:   Breath sounds heard bilaterally equally.  No wheezing.  Faint bilateral crackles heard. No Pleural rub or bronchial breathing.   Heart:  Normal S1 and S2, no murmur, no gallop, no rub. No JVD.   Abdomen:   Normal bowel sounds, no masses, no organomegaly. Soft, nontender, nondistended, no rebound tenderness.   Extremities: Supple, 1+ bilateral  lower extremity edema, no cyanosis, no clubbing.   Skin: No bleeding or rash.   Neurologic: Alert and oriented x 3. No facial asymmetry. Moves all four limbs. No tremors.      Laboratory results:    Results from last 7 days   Lab Units 05/13/23  0559 05/12/23  0541 05/11/23  1523   SODIUM mmol/L 136 136 138   POTASSIUM mmol/L 3.2* 3.6 4.3   CHLORIDE mmol/L 93* 94* 98   CO2 mmol/L 34.5* 31.1* 28.9   BUN mg/dL 17 16 16   CREATININE mg/dL 0.72 0.69 0.71   CALCIUM mg/dL 8.9 8.9 8.9   BILIRUBIN mg/dL  --   --  0.3   ALK PHOS U/L  --   --  72   ALT (SGPT) U/L  --   --  <5   AST (SGOT) U/L  --   --  11   GLUCOSE mg/dL 287* 293* 224*     Results from last 7 days   Lab Units 05/13/23  0559 05/12/23  0541 05/11/23  1523   WBC 10*3/mm3 13.03* 13.98* 16.17*   HEMOGLOBIN g/dL 11.1* 12.8 13.3   HEMATOCRIT % 35.4 40.0 42.3   PLATELETS 10*3/mm3 224 234 256             Results from last 7 days   Lab Units 05/11/23  1654 05/11/23  1650   BLOODCX  No growth at 24 hours No growth at 24 hours     No results for input(s): PHART, MLW0FFW, PO2ART, OBB8XMG, BASEEXCESS in the last 8760 hours.   I have reviewed the patient's laboratory results.    Radiology results:    Adult Transthoracic Echo Complete With Contrast if Necessary Per Protocol    Result Date: 5/12/2023  •  Left ventricular ejection fraction appears to be 66 - 70%. •  Left ventricular wall thickness is consistent with moderate concentric hypertrophy. •  Left ventricular diastolic function is consistent with (grade III w/high LAP) reversible restrictive pattern. •  The right ventricular cavity is mild to moderately dilated. •  The right atrial cavity is borderline dilated. •  There is moderate calcification of the aortic valve mainly affecting the non-coronary, left coronary and right coronary cusp(s). •  Estimated right ventricular systolic pressure from tricuspid regurgitation is markedly elevated (>55 mmHg). •  Moderate to severe pulmonary hypertension is present. •  There is  a small (<1cm) pericardial effusion adjacent to the right atrium. There is no evidence of cardiac tamponade.     CT Abdomen Pelvis Without Contrast    Result Date: 5/11/2023  PROCEDURE: CT CHEST ABDOMEN and PELVIS WO CONTRAST-  HISTORY: Right lower abdominal pain  COMPARISON: None.  PROCEDURE: . Axial images were obtained from the lung apex to the pubic symphysis by computed tomography. This study was performed with techniques to keep radiation doses as low as reasonably achievable, (ALARA). Individualized dose reduction techniques using automated exposure control or adjustment of mA and/or kV according to the patient size were employed.  FINDINGS:  CHEST: There is no axillary adenopathy. There is there is a single precarinal lymph node with short axis measuring 12 mm and measuring 22 mm long axis which is mildly prominent. Recommend 3 month follow-up to document stability or resolution. the heart size is enlarged. There is trace pericardial and bilateral pleural effusions. There is bilateral lower lobe associated airspace disease with component of atelectasis. Pneumonia is in the differential. Calcified granuloma identified. Upper lobes are clear.  ABDOMEN: The liver is homogenous with no focal abnormality. Gallbladder contains at least 2 gallstones. Consider gallbladder ultrasound for further evaluation. Vascular calcifications noted. The spleen is unremarkable. No adrenal mass is present.  The pancreas is unremarkable. The kidneys demonstrate mild, bilateral cortical thinning, otherwise kidneys appear normal. There is bilateral perirenal stranding. The aorta is normal in caliber. There is no free fluid or adenopathy. Significant, subcutaneous edema identified throughout the abdomen and pelvis.  PELVIS: The GI tract demonstrates no obstruction. The appendix is is not identified but no secondary signs of appendicitis seen. The urinary bladder is mostly collapsed with no abnormality seen. Uterus is diminutive or  absent. There is a mild amount of free fluid in the pelvis.      Impression: Cardiomegaly, bilateral pleural effusions, significant subcutaneous edema and minimal pelvic ascites; consider CHF and anasarca..  Bilateral lower lobe airspace disease, possible pneumonia.  Cholelithiasis, consider gallbladder ultrasound.  This report was signed and finalized on 5/11/2023 4:27 PM by Deyanira العراقي MD.    CT Chest Without Contrast Diagnostic    Result Date: 5/11/2023  PROCEDURE: CT CHEST ABDOMEN and PELVIS WO CONTRAST-  HISTORY: Right lower abdominal pain  COMPARISON: None.  PROCEDURE: . Axial images were obtained from the lung apex to the pubic symphysis by computed tomography. This study was performed with techniques to keep radiation doses as low as reasonably achievable, (ALARA). Individualized dose reduction techniques using automated exposure control or adjustment of mA and/or kV according to the patient size were employed.  FINDINGS:  CHEST: There is no axillary adenopathy. There is there is a single precarinal lymph node with short axis measuring 12 mm and measuring 22 mm long axis which is mildly prominent. Recommend 3 month follow-up to document stability or resolution. the heart size is enlarged. There is trace pericardial and bilateral pleural effusions. There is bilateral lower lobe associated airspace disease with component of atelectasis. Pneumonia is in the differential. Calcified granuloma identified. Upper lobes are clear.  ABDOMEN: The liver is homogenous with no focal abnormality. Gallbladder contains at least 2 gallstones. Consider gallbladder ultrasound for further evaluation. Vascular calcifications noted. The spleen is unremarkable. No adrenal mass is present.  The pancreas is unremarkable. The kidneys demonstrate mild, bilateral cortical thinning, otherwise kidneys appear normal. There is bilateral perirenal stranding. The aorta is normal in caliber. There is no free fluid or adenopathy. Significant,  subcutaneous edema identified throughout the abdomen and pelvis.  PELVIS: The GI tract demonstrates no obstruction. The appendix is is not identified but no secondary signs of appendicitis seen. The urinary bladder is mostly collapsed with no abnormality seen. Uterus is diminutive or absent. There is a mild amount of free fluid in the pelvis.      Impression: Cardiomegaly, bilateral pleural effusions, significant subcutaneous edema and minimal pelvic ascites; consider CHF and anasarca..  Bilateral lower lobe airspace disease, possible pneumonia.  Cholelithiasis, consider gallbladder ultrasound.  This report was signed and finalized on 5/11/2023 4:27 PM by Deyanira العراقي MD.    US Gallbladder    Result Date: 5/11/2023  FINAL REPORT TECHNIQUE: Sonographic images of the right upper quadrant were obtained. CLINICAL HISTORY: Cholelithiasis on CT, RUQ tenderness FINDINGS: The gallbladder is contracted and appears stone filled. There is no biliary ductal dilation.  The common bile duct is normal 4 mm in diameter.  The visualized liver and right kidney are normal. There is no ascites.     Impression: Gallstones.  Otherwise unremarkable. Authenticated and Electronically Signed by Pascual Palomo M.D. on 05/11/2023 08:07:31 PM    XR Chest 1 View    Result Date: 5/12/2023  PROCEDURE: XR CHEST 1 VW-    HISTORY: Lower extremity edema  COMPARISON: None.  FINDINGS: The heart is mildly enlarged. The mediastinum is unremarkable. There is a small left pleural effusion. There is left basilar atelectasis or infiltrate. Bilateral interstitial edema is consistent with CHF. There is no pneumothorax. There are no acute osseous abnormalities.      Impression: Small left pleural effusion.  Left basilar atelectasis or infiltrate.  Bilateral interstitial edema consistent with CHF. Continued follow-up is recommended.        Images were reviewed, interpreted, and dictated by Dr. Deyanira العراقي MD Transcribed by Farzaneh Calvillo PA-C.  This report was  signed and finalized on 5/12/2023 9:18 AM by Deyanira العراقي MD.    I have reviewed the patient's radiology reports.    Medication Review:     I have reviewed the patient's active and prn medications.     Problem List:      Acute on chronic congestive heart failure, unspecified heart failure type      Assessment:    1. Acute CHF exacerbation, POA  2. Hypertensive urgency, POA  3. Hyperglycemia, POA  4. Insulin-dependent type 2 diabetes  5. Paroxysmal A-fib on Eliquis  6. Anxiety/depression  7. Hypertension  8. Hypothyroidism  9. Obesity  10. Impaired mobility and ADLs    Plan:    Acute CHF exacerbation  Hypertensive urgency  - Heart failure pathway initiated  - 2D echo with EF of 66 to 70% and grade 3 diastolic dysfunction, moderate to severe pulmonary hypertension moderate calcification of aortic valve  - Strict I's and O's, IV diuresis with Lasix 40 mg twice daily  -Patient at -1.  Balance.  - Nitro paste discontinued, started nitroglycerin drip  -Daughter reports that patient should have been on amlodipine 10 mg which she used to take at home daily, will restart patient on amlodipine and increase her lisinopril to 40 mg daily.  We will add hydralazine as needed trying to titrate her nitro drip off.  - Fluid restriction  -Pro-Nehemiah negative, no indication for antibiotics, WBC trending down.     Hyperglycemia/type 2 diabetes  - A1c 9.1  - Basal Levemir increased to 20 units daily  - Accu-Cheks ACHS and sliding scale insulin    Hypomagnesemia  Hypokalemia  -Replace per protocol    Cerumen impaction  -Debrox ordered    Epistaxis, resolved  -Status post packing, no more bleeding.     Supportive care, further orders as clinical course dictates.  PT/OT.     DVT Prophylaxis: Lovenox  Code Status: DNR  Diet: Cardiac/diabetic, fluid restriction  Discharge Plan: To be determined, likely back to nursing home in 1 to 2 days.    Jasmeet Lombardi MD  05/13/23  10:48 EDT    Dictated utilizing Dragon dictation.

## 2023-05-13 NOTE — PLAN OF CARE
Goal Outcome Evaluation:  Plan of Care Reviewed With: patient        Progress: no change  Outcome Evaluation: no acute events during shift. BP elevated, nitro drip max. afib on tele. no other changes in pt condition. will continue to monitor.

## 2023-05-13 NOTE — THERAPY EVALUATION
PT order received.  Evaluation held, as patient is on a nitro drip.  PT will follow up tomorrow.

## 2023-05-13 NOTE — PLAN OF CARE
Problem: Adjustment to Illness (Heart Failure)  Goal: Optimal Coping  Outcome: Ongoing, Progressing     Problem: Adjustment to Illness (Heart Failure)  Goal: Optimal Coping  Outcome: Ongoing, Progressing     Problem: Adjustment to Illness (Heart Failure)  Goal: Optimal Coping  Outcome: Ongoing, Progressing     Problem: Cardiac Output Decreased (Heart Failure)  Goal: Optimal Cardiac Output  Outcome: Ongoing, Progressing     Problem: Dysrhythmia (Heart Failure)  Goal: Stable Heart Rate and Rhythm  Outcome: Ongoing, Progressing     Problem: Fluid Imbalance (Heart Failure)  Goal: Fluid Balance  Outcome: Ongoing, Progressing     Problem: Functional Ability Impaired (Heart Failure)  Goal: Optimal Functional Ability  Outcome: Ongoing, Progressing  Intervention: Optimize Functional Ability  Recent Flowsheet Documentation  Taken 5/13/2023 1814 by Oxana Naik RN  Activity Management: bedrest     Problem: Oral Intake Inadequate (Heart Failure)  Goal: Optimal Nutrition Intake  Outcome: Ongoing, Progressing     Problem: Respiratory Compromise (Heart Failure)  Goal: Effective Oxygenation and Ventilation  Outcome: Ongoing, Progressing     Problem: Sleep Disordered Breathing (Heart Failure)  Goal: Effective Breathing Pattern During Sleep  Outcome: Ongoing, Progressing     Problem: Adult Inpatient Plan of Care  Goal: Plan of Care Review  Outcome: Ongoing, Progressing  Flowsheets (Taken 5/13/2023 1837)  Progress: no change  Plan of Care Reviewed With: patient  Goal: Patient-Specific Goal (Individualized)  Outcome: Ongoing, Progressing  Goal: Absence of Hospital-Acquired Illness or Injury  Outcome: Ongoing, Progressing  Intervention: Identify and Manage Fall Risk  Recent Flowsheet Documentation  Taken 5/13/2023 1814 by Oxana Naik, RN  Safety Promotion/Fall Prevention:   activity supervised   assistive device/personal items within reach   clutter free environment maintained   safety round/check  completed  Intervention: Prevent Skin Injury  Recent Flowsheet Documentation  Taken 5/13/2023 1814 by Oxana Naik RN  Body Position: weight shifting  Intervention: Prevent and Manage VTE (Venous Thromboembolism) Risk  Recent Flowsheet Documentation  Taken 5/13/2023 1814 by Oxana Naik RN  Activity Management: bedrest  Goal: Optimal Comfort and Wellbeing  Outcome: Ongoing, Progressing  Goal: Readiness for Transition of Care  Outcome: Ongoing, Progressing     Problem: Hypertension Acute  Goal: Blood Pressure Within Desired Range  Outcome: Ongoing, Progressing     Problem: Fluid Volume Excess  Goal: Fluid Balance  Outcome: Ongoing, Progressing     Problem: Heart Failure Comorbidity  Goal: Maintenance of Heart Failure Symptom Control  Outcome: Ongoing, Progressing     Problem: Hypertension Comorbidity  Goal: Blood Pressure in Desired Range  Outcome: Ongoing, Progressing     Problem: Osteoarthritis Comorbidity  Goal: Maintenance of Osteoarthritis Symptom Control  Outcome: Ongoing, Progressing  Intervention: Maintain Osteoarthritis Symptom Control  Recent Flowsheet Documentation  Taken 5/13/2023 1814 by Oxana Naik RN  Activity Management: bedrest     Problem: Skin Injury Risk Increased  Goal: Skin Health and Integrity  Outcome: Ongoing, Progressing  Intervention: Optimize Skin Protection  Recent Flowsheet Documentation  Taken 5/13/2023 1814 by Oxana Naik RN  Head of Bed (HOB) Positioning: HOB elevated     Problem: Fall Injury Risk  Goal: Absence of Fall and Fall-Related Injury  Outcome: Ongoing, Progressing  Intervention: Promote Injury-Free Environment  Recent Flowsheet Documentation  Taken 5/13/2023 1814 by Oxana Naik RN  Safety Promotion/Fall Prevention:   activity supervised   assistive device/personal items within reach   clutter free environment maintained   safety round/check completed     Problem: Pain Acute  Goal: Acceptable Pain Control and Functional Ability  Outcome:  Ongoing, Progressing   Goal Outcome Evaluation:  Plan of Care Reviewed With: patient        Progress: no change          Plan of care discussed with patient. Patient transferred to 3rd floor from [facility], vs noted, prn BP meds given for HTN, prn pain medication given for pain.

## 2023-05-13 NOTE — PLAN OF CARE
Goal Outcome Evaluation:  Plan of Care Reviewed With: patient        Progress: no change  Outcome Evaluation: no acute events during shift. BP elevated, nitro drip max. afib on tele. no other changes in pt condition. will continue to monitor.         Daily Care Plan Summary: Heart Failure    Diuretic in use (IV or PO):   IV        Daily weight (up or down):    loss: 0lbs      Output > Intake (yes/no):Yes      O2 Requirements (current, any change?):  5 liters/min via nasal cannula      Symptoms noted with Activity (Respiratory Tolerance, functional state):     INCREASED PAIN      Anticipated Discharge Plans:     D/C BACK TO [place].

## 2023-05-13 NOTE — PLAN OF CARE
Goal Outcome Evaluation:  Plan of Care Reviewed With: patient, daughter, grandchild(anand)        Progress: improving   No acute events this shift.

## 2023-05-14 LAB
ANION GAP SERPL CALCULATED.3IONS-SCNC: 8.8 MMOL/L (ref 5–15)
BUN SERPL-MCNC: 18 MG/DL (ref 8–23)
BUN/CREAT SERPL: 21.4 (ref 7–25)
CALCIUM SPEC-SCNC: 9 MG/DL (ref 8.6–10.5)
CHLORIDE SERPL-SCNC: 91 MMOL/L (ref 98–107)
CO2 SERPL-SCNC: 33.2 MMOL/L (ref 22–29)
CREAT SERPL-MCNC: 0.84 MG/DL (ref 0.57–1)
DEPRECATED RDW RBC AUTO: 51 FL (ref 37–54)
EGFRCR SERPLBLD CKD-EPI 2021: 67.4 ML/MIN/1.73
ERYTHROCYTE [DISTWIDTH] IN BLOOD BY AUTOMATED COUNT: 15.6 % (ref 12.3–15.4)
GLUCOSE BLDC GLUCOMTR-MCNC: 142 MG/DL (ref 70–130)
GLUCOSE BLDC GLUCOMTR-MCNC: 167 MG/DL (ref 70–130)
GLUCOSE BLDC GLUCOMTR-MCNC: 267 MG/DL (ref 70–130)
GLUCOSE SERPL-MCNC: 167 MG/DL (ref 65–99)
HCT VFR BLD AUTO: 39.1 % (ref 34–46.6)
HGB BLD-MCNC: 12.4 G/DL (ref 12–15.9)
MAGNESIUM SERPL-MCNC: 2.2 MG/DL (ref 1.6–2.4)
MCH RBC QN AUTO: 28.3 PG (ref 26.6–33)
MCHC RBC AUTO-ENTMCNC: 31.7 G/DL (ref 31.5–35.7)
MCV RBC AUTO: 89.3 FL (ref 79–97)
PLATELET # BLD AUTO: 243 10*3/MM3 (ref 140–450)
PMV BLD AUTO: 12.3 FL (ref 6–12)
POTASSIUM SERPL-SCNC: 4.2 MMOL/L (ref 3.5–5.2)
RBC # BLD AUTO: 4.38 10*6/MM3 (ref 3.77–5.28)
SODIUM SERPL-SCNC: 133 MMOL/L (ref 136–145)
WBC NRBC COR # BLD: 13.27 10*3/MM3 (ref 3.4–10.8)

## 2023-05-14 PROCEDURE — 85027 COMPLETE CBC AUTOMATED: CPT | Performed by: FAMILY MEDICINE

## 2023-05-14 PROCEDURE — 83735 ASSAY OF MAGNESIUM: CPT | Performed by: FAMILY MEDICINE

## 2023-05-14 PROCEDURE — 80048 BASIC METABOLIC PNL TOTAL CA: CPT | Performed by: FAMILY MEDICINE

## 2023-05-14 PROCEDURE — 82948 REAGENT STRIP/BLOOD GLUCOSE: CPT

## 2023-05-14 PROCEDURE — 25010000002 FUROSEMIDE PER 20 MG: Performed by: INTERNAL MEDICINE

## 2023-05-14 PROCEDURE — 63710000001 INSULIN ASPART PER 5 UNITS: Performed by: INTERNAL MEDICINE

## 2023-05-14 PROCEDURE — 99232 SBSQ HOSP IP/OBS MODERATE 35: CPT | Performed by: FAMILY MEDICINE

## 2023-05-14 PROCEDURE — 63710000001 INSULIN DETEMIR PER 5 UNITS: Performed by: FAMILY MEDICINE

## 2023-05-14 RX ADMIN — ACETAMINOPHEN 650 MG: 325 TABLET, FILM COATED ORAL at 21:06

## 2023-05-14 RX ADMIN — AMLODIPINE BESYLATE 10 MG: 5 TABLET ORAL at 08:24

## 2023-05-14 RX ADMIN — SERTRALINE 50 MG: 50 TABLET, FILM COATED ORAL at 08:24

## 2023-05-14 RX ADMIN — DOCUSATE SODIUM 100 MG: 100 CAPSULE, LIQUID FILLED ORAL at 08:24

## 2023-05-14 RX ADMIN — SUCRALFATE 1 G: 1 TABLET ORAL at 12:07

## 2023-05-14 RX ADMIN — Medication 10 ML: at 08:58

## 2023-05-14 RX ADMIN — CHLORDIAZEPOXIDE HYDROCHLORIDE 5 MG: 5 CAPSULE ORAL at 21:05

## 2023-05-14 RX ADMIN — SENNOSIDES AND DOCUSATE SODIUM 2 TABLET: 50; 8.6 TABLET ORAL at 08:24

## 2023-05-14 RX ADMIN — HYDROCODONE BITARTRATE AND ACETAMINOPHEN 1 TABLET: 5; 325 TABLET ORAL at 08:32

## 2023-05-14 RX ADMIN — Medication 5 DROP: at 08:25

## 2023-05-14 RX ADMIN — DOCUSATE SODIUM 100 MG: 100 CAPSULE, LIQUID FILLED ORAL at 21:05

## 2023-05-14 RX ADMIN — Medication 5 MG: at 21:06

## 2023-05-14 RX ADMIN — FUROSEMIDE 40 MG: 10 INJECTION, SOLUTION INTRAMUSCULAR; INTRAVENOUS at 13:21

## 2023-05-14 RX ADMIN — SUCRALFATE 1 G: 1 TABLET ORAL at 17:07

## 2023-05-14 RX ADMIN — APIXABAN 5 MG: 5 TABLET, FILM COATED ORAL at 08:24

## 2023-05-14 RX ADMIN — Medication 5 DROP: at 21:07

## 2023-05-14 RX ADMIN — SUCRALFATE 1 G: 1 TABLET ORAL at 21:05

## 2023-05-14 RX ADMIN — INSULIN ASPART 2 UNITS: 100 INJECTION, SOLUTION INTRAVENOUS; SUBCUTANEOUS at 12:07

## 2023-05-14 RX ADMIN — INSULIN DETEMIR 20 UNITS: 100 INJECTION, SOLUTION SUBCUTANEOUS at 23:00

## 2023-05-14 RX ADMIN — CLOPIDOGREL BISULFATE 75 MG: 75 TABLET ORAL at 08:24

## 2023-05-14 RX ADMIN — Medication 10 ML: at 21:05

## 2023-05-14 RX ADMIN — LEVOTHYROXINE SODIUM 75 MCG: 75 TABLET ORAL at 08:24

## 2023-05-14 RX ADMIN — INSULIN ASPART 6 UNITS: 100 INJECTION, SOLUTION INTRAVENOUS; SUBCUTANEOUS at 17:07

## 2023-05-14 RX ADMIN — ISOSORBIDE MONONITRATE 60 MG: 60 TABLET, EXTENDED RELEASE ORAL at 08:24

## 2023-05-14 RX ADMIN — FUROSEMIDE 40 MG: 10 INJECTION, SOLUTION INTRAMUSCULAR; INTRAVENOUS at 02:14

## 2023-05-14 RX ADMIN — APIXABAN 5 MG: 5 TABLET, FILM COATED ORAL at 21:06

## 2023-05-14 RX ADMIN — SUCRALFATE 1 G: 1 TABLET ORAL at 08:24

## 2023-05-14 RX ADMIN — LORAZEPAM 0.5 MG: 0.5 TABLET ORAL at 21:06

## 2023-05-14 RX ADMIN — LISINOPRIL 40 MG: 20 TABLET ORAL at 08:24

## 2023-05-14 RX ADMIN — CHLORDIAZEPOXIDE HYDROCHLORIDE 5 MG: 5 CAPSULE ORAL at 08:24

## 2023-05-14 RX ADMIN — MIRTAZAPINE 15 MG: 15 TABLET, ORALLY DISINTEGRATING ORAL at 21:15

## 2023-05-14 NOTE — PLAN OF CARE
Problem: Adjustment to Illness (Heart Failure)  Goal: Optimal Coping  Outcome: Ongoing, Progressing     Problem: Adjustment to Illness (Heart Failure)  Goal: Optimal Coping  Outcome: Ongoing, Progressing     Problem: Adjustment to Illness (Heart Failure)  Goal: Optimal Coping  Outcome: Ongoing, Progressing     Problem: Cardiac Output Decreased (Heart Failure)  Goal: Optimal Cardiac Output  Outcome: Ongoing, Progressing     Problem: Dysrhythmia (Heart Failure)  Goal: Stable Heart Rate and Rhythm  Outcome: Ongoing, Progressing     Problem: Fluid Imbalance (Heart Failure)  Goal: Fluid Balance  Outcome: Ongoing, Progressing     Problem: Functional Ability Impaired (Heart Failure)  Goal: Optimal Functional Ability  Outcome: Ongoing, Progressing  Intervention: Optimize Functional Ability  Recent Flowsheet Documentation  Taken 5/14/2023 1800 by Oxana Naik RN  Activity Management: activity encouraged  Taken 5/14/2023 1600 by Oxana Naik RN  Activity Management: activity encouraged  Taken 5/14/2023 1400 by Oxana Naik RN  Activity Management: activity encouraged  Taken 5/14/2023 1200 by Oxana Naik RN  Activity Management: activity encouraged  Taken 5/14/2023 1000 by Oxana Naik RN  Activity Management: activity encouraged  Taken 5/14/2023 0800 by Oxana Naik RN  Activity Management: activity encouraged     Problem: Oral Intake Inadequate (Heart Failure)  Goal: Optimal Nutrition Intake  Outcome: Ongoing, Progressing     Problem: Respiratory Compromise (Heart Failure)  Goal: Effective Oxygenation and Ventilation  Outcome: Ongoing, Progressing     Problem: Sleep Disordered Breathing (Heart Failure)  Goal: Effective Breathing Pattern During Sleep  Outcome: Ongoing, Progressing     Problem: Adult Inpatient Plan of Care  Goal: Plan of Care Review  Outcome: Ongoing, Progressing  Flowsheets (Taken 5/14/2023 1834)  Progress: improving  Plan of Care Reviewed With: patient  Goal:  Patient-Specific Goal (Individualized)  Outcome: Ongoing, Progressing  Goal: Absence of Hospital-Acquired Illness or Injury  Outcome: Ongoing, Progressing  Intervention: Identify and Manage Fall Risk  Recent Flowsheet Documentation  Taken 5/14/2023 1800 by Oxana Naik RN  Safety Promotion/Fall Prevention:   activity supervised   assistive device/personal items within reach   clutter free environment maintained   safety round/check completed  Taken 5/14/2023 1600 by Oxana Naik RN  Safety Promotion/Fall Prevention:   activity supervised   assistive device/personal items within reach   clutter free environment maintained   safety round/check completed  Taken 5/14/2023 1400 by Oxana Naik RN  Safety Promotion/Fall Prevention:   activity supervised   clutter free environment maintained   assistive device/personal items within reach   safety round/check completed  Taken 5/14/2023 1200 by Oxana Naik RN  Safety Promotion/Fall Prevention:   activity supervised   assistive device/personal items within reach   clutter free environment maintained   safety round/check completed  Taken 5/14/2023 1000 by Oxana Naik RN  Safety Promotion/Fall Prevention:   activity supervised   assistive device/personal items within reach   clutter free environment maintained   safety round/check completed  Taken 5/14/2023 0800 by Oxana Naik RN  Safety Promotion/Fall Prevention:   activity supervised   assistive device/personal items within reach   clutter free environment maintained   safety round/check completed  Intervention: Prevent Skin Injury  Recent Flowsheet Documentation  Taken 5/14/2023 1800 by Oxana Naik RN  Body Position:   turned   left  Taken 5/14/2023 1600 by Oxana Naik RN  Body Position: weight shifting  Taken 5/14/2023 1400 by Oxana Naik RN  Body Position: weight shifting  Taken 5/14/2023 1200 by Oxana Naik RN  Body Position:   turned    left  Taken 5/14/2023 1000 by Oxana Naik RN  Body Position:   turned   right  Taken 5/14/2023 0800 by Oxana Naik RN  Body Position: weight shifting  Intervention: Prevent and Manage VTE (Venous Thromboembolism) Risk  Recent Flowsheet Documentation  Taken 5/14/2023 1800 by Oxana Naik RN  Activity Management: activity encouraged  Taken 5/14/2023 1600 by Oxana Naik RN  Activity Management: activity encouraged  Taken 5/14/2023 1400 by Oxana Naik RN  Activity Management: activity encouraged  Taken 5/14/2023 1200 by Oxana Naik RN  Activity Management: activity encouraged  Taken 5/14/2023 1000 by Oxana Naik RN  Activity Management: activity encouraged  Taken 5/14/2023 0800 by Oxana Naik RN  Activity Management: activity encouraged  Intervention: Prevent Infection  Recent Flowsheet Documentation  Taken 5/14/2023 1800 by Oxana Naik RN  Infection Prevention:   environmental surveillance performed   equipment surfaces disinfected   hand hygiene promoted   single patient room provided  Taken 5/14/2023 1600 by Oxana Naik RN  Infection Prevention:   environmental surveillance performed   equipment surfaces disinfected   hand hygiene promoted   single patient room provided  Taken 5/14/2023 1400 by Oxana Naik RN  Infection Prevention:   environmental surveillance performed   equipment surfaces disinfected   hand hygiene promoted   single patient room provided  Taken 5/14/2023 1200 by Oxana Naik RN  Infection Prevention:   environmental surveillance performed   equipment surfaces disinfected   hand hygiene promoted   single patient room provided  Taken 5/14/2023 1000 by Oxana Naik RN  Infection Prevention:   environmental surveillance performed   equipment surfaces disinfected   hand hygiene promoted   single patient room provided  Taken 5/14/2023 0800 by Oxana Naik RN  Infection Prevention:   environmental  surveillance performed   equipment surfaces disinfected   hand hygiene promoted   single patient room provided  Goal: Optimal Comfort and Wellbeing  Outcome: Ongoing, Progressing  Goal: Readiness for Transition of Care  Outcome: Ongoing, Progressing     Problem: Adult Inpatient Plan of Care  Goal: Absence of Hospital-Acquired Illness or Injury  Outcome: Ongoing, Progressing  Intervention: Identify and Manage Fall Risk  Recent Flowsheet Documentation  Taken 5/14/2023 1800 by Oxana Naik RN  Safety Promotion/Fall Prevention:   activity supervised   assistive device/personal items within reach   clutter free environment maintained   safety round/check completed  Taken 5/14/2023 1600 by Oxana Naik RN  Safety Promotion/Fall Prevention:   activity supervised   assistive device/personal items within reach   clutter free environment maintained   safety round/check completed  Taken 5/14/2023 1400 by Oxana Naik RN  Safety Promotion/Fall Prevention:   activity supervised   clutter free environment maintained   assistive device/personal items within reach   safety round/check completed  Taken 5/14/2023 1200 by Oxana Naik RN  Safety Promotion/Fall Prevention:   activity supervised   assistive device/personal items within reach   clutter free environment maintained   safety round/check completed  Taken 5/14/2023 1000 by Oxana Naik RN  Safety Promotion/Fall Prevention:   activity supervised   assistive device/personal items within reach   clutter free environment maintained   safety round/check completed  Taken 5/14/2023 0800 by Oxana Naik RN  Safety Promotion/Fall Prevention:   activity supervised   assistive device/personal items within reach   clutter free environment maintained   safety round/check completed  Intervention: Prevent Skin Injury  Recent Flowsheet Documentation  Taken 5/14/2023 1800 by Oxana Naik RN  Body Position:   turned   left  Taken 5/14/2023 1600  by Oxana Naik RN  Body Position: weight shifting  Taken 5/14/2023 1400 by Oxana Naik RN  Body Position: weight shifting  Taken 5/14/2023 1200 by Oxana Naik RN  Body Position:   turned   left  Taken 5/14/2023 1000 by Oxana Naik RN  Body Position:   turned   right  Taken 5/14/2023 0800 by Oxana Naik RN  Body Position: weight shifting  Intervention: Prevent and Manage VTE (Venous Thromboembolism) Risk  Recent Flowsheet Documentation  Taken 5/14/2023 1800 by Oxana Naik RN  Activity Management: activity encouraged  Taken 5/14/2023 1600 by Oxana Naik RN  Activity Management: activity encouraged  Taken 5/14/2023 1400 by Oxana Naik RN  Activity Management: activity encouraged  Taken 5/14/2023 1200 by Oxana Naik RN  Activity Management: activity encouraged  Taken 5/14/2023 1000 by Oxana Naik RN  Activity Management: activity encouraged  Taken 5/14/2023 0800 by Oxana Naik RN  Activity Management: activity encouraged  Intervention: Prevent Infection  Recent Flowsheet Documentation  Taken 5/14/2023 1800 by Oxana Naik RN  Infection Prevention:   environmental surveillance performed   equipment surfaces disinfected   hand hygiene promoted   single patient room provided  Taken 5/14/2023 1600 by Oxana Naik RN  Infection Prevention:   environmental surveillance performed   equipment surfaces disinfected   hand hygiene promoted   single patient room provided  Taken 5/14/2023 1400 by Oxana Naik RN  Infection Prevention:   environmental surveillance performed   equipment surfaces disinfected   hand hygiene promoted   single patient room provided  Taken 5/14/2023 1200 by Oxana Naik RN  Infection Prevention:   environmental surveillance performed   equipment surfaces disinfected   hand hygiene promoted   single patient room provided  Taken 5/14/2023 1000 by Oxana Naik RN  Infection Prevention:    environmental surveillance performed   equipment surfaces disinfected   hand hygiene promoted   single patient room provided  Taken 5/14/2023 0800 by Oxana Naik RN  Infection Prevention:   environmental surveillance performed   equipment surfaces disinfected   hand hygiene promoted   single patient room provided     Problem: Adult Inpatient Plan of Care  Goal: Absence of Hospital-Acquired Illness or Injury  Intervention: Identify and Manage Fall Risk  Recent Flowsheet Documentation  Taken 5/14/2023 1800 by Oxana Naik RN  Safety Promotion/Fall Prevention:   activity supervised   assistive device/personal items within reach   clutter free environment maintained   safety round/check completed  Taken 5/14/2023 1600 by Oxana Naik RN  Safety Promotion/Fall Prevention:   activity supervised   assistive device/personal items within reach   clutter free environment maintained   safety round/check completed  Taken 5/14/2023 1400 by Oxana Naik RN  Safety Promotion/Fall Prevention:   activity supervised   clutter free environment maintained   assistive device/personal items within reach   safety round/check completed  Taken 5/14/2023 1200 by Oxana Naik RN  Safety Promotion/Fall Prevention:   activity supervised   assistive device/personal items within reach   clutter free environment maintained   safety round/check completed  Taken 5/14/2023 1000 by Oxana Naik RN  Safety Promotion/Fall Prevention:   activity supervised   assistive device/personal items within reach   clutter free environment maintained   safety round/check completed  Taken 5/14/2023 0800 by Oxana Naik RN  Safety Promotion/Fall Prevention:   activity supervised   assistive device/personal items within reach   clutter free environment maintained   safety round/check completed     Problem: Adult Inpatient Plan of Care  Goal: Absence of Hospital-Acquired Illness or Injury  Intervention: Prevent Skin  Injury  Recent Flowsheet Documentation  Taken 5/14/2023 1800 by Oxana Naik RN  Body Position:   turned   left  Taken 5/14/2023 1600 by Oxana Naik RN  Body Position: weight shifting  Taken 5/14/2023 1400 by Oxana Naik RN  Body Position: weight shifting  Taken 5/14/2023 1200 by Oxana Naik RN  Body Position:   turned   left  Taken 5/14/2023 1000 by Oxana Naik RN  Body Position:   turned   right  Taken 5/14/2023 0800 by Oxana Naik RN  Body Position: weight shifting     Problem: Adult Inpatient Plan of Care  Goal: Absence of Hospital-Acquired Illness or Injury  Intervention: Prevent and Manage VTE (Venous Thromboembolism) Risk  Recent Flowsheet Documentation  Taken 5/14/2023 1800 by Oxana Naik RN  Activity Management: activity encouraged  Taken 5/14/2023 1600 by Oxana Naik RN  Activity Management: activity encouraged  Taken 5/14/2023 1400 by Oxana Naik RN  Activity Management: activity encouraged  Taken 5/14/2023 1200 by Oxana Naik RN  Activity Management: activity encouraged  Taken 5/14/2023 1000 by Oxana Naik RN  Activity Management: activity encouraged  Taken 5/14/2023 0800 by Oxana Naik RN  Activity Management: activity encouraged     Problem: Adult Inpatient Plan of Care  Goal: Absence of Hospital-Acquired Illness or Injury  Intervention: Prevent Infection  Recent Flowsheet Documentation  Taken 5/14/2023 1800 by Oxana Naik RN  Infection Prevention:   environmental surveillance performed   equipment surfaces disinfected   hand hygiene promoted   single patient room provided  Taken 5/14/2023 1600 by Oxana Naik RN  Infection Prevention:   environmental surveillance performed   equipment surfaces disinfected   hand hygiene promoted   single patient room provided  Taken 5/14/2023 1400 by Oxana Naik RN  Infection Prevention:   environmental surveillance performed   equipment surfaces  disinfected   hand hygiene promoted   single patient room provided  Taken 5/14/2023 1200 by Oxana Naik RN  Infection Prevention:   environmental surveillance performed   equipment surfaces disinfected   hand hygiene promoted   single patient room provided  Taken 5/14/2023 1000 by Oxana Naik RN  Infection Prevention:   environmental surveillance performed   equipment surfaces disinfected   hand hygiene promoted   single patient room provided  Taken 5/14/2023 0800 by Oxana Naik RN  Infection Prevention:   environmental surveillance performed   equipment surfaces disinfected   hand hygiene promoted   single patient room provided     Problem: Adult Inpatient Plan of Care  Goal: Optimal Comfort and Wellbeing  Outcome: Ongoing, Progressing     Problem: Adult Inpatient Plan of Care  Goal: Readiness for Transition of Care  Outcome: Ongoing, Progressing     Problem: Hypertension Acute  Goal: Blood Pressure Within Desired Range  Outcome: Ongoing, Progressing     Problem: Fluid Volume Excess  Goal: Fluid Balance  Outcome: Ongoing, Progressing     Problem: Diabetes Comorbidity  Goal: Blood Glucose Level Within Targeted Range  Outcome: Ongoing, Progressing     Problem: Heart Failure Comorbidity  Goal: Maintenance of Heart Failure Symptom Control  Outcome: Ongoing, Progressing     Problem: Hypertension Comorbidity  Goal: Blood Pressure in Desired Range  Outcome: Ongoing, Progressing     Problem: Osteoarthritis Comorbidity  Goal: Maintenance of Osteoarthritis Symptom Control  Outcome: Ongoing, Progressing  Intervention: Maintain Osteoarthritis Symptom Control  Recent Flowsheet Documentation  Taken 5/14/2023 1800 by Oxana Naik RN  Activity Management: activity encouraged  Taken 5/14/2023 1600 by Oxana Naik RN  Activity Management: activity encouraged  Taken 5/14/2023 1400 by Oxana Naik RN  Activity Management: activity encouraged  Taken 5/14/2023 1200 by Oxana Naik  RN  Activity Management: activity encouraged  Taken 5/14/2023 1000 by Oxana Naik RN  Activity Management: activity encouraged  Taken 5/14/2023 0800 by Oxana Naik RN  Activity Management: activity encouraged     Problem: Skin Injury Risk Increased  Goal: Skin Health and Integrity  Outcome: Ongoing, Progressing  Intervention: Optimize Skin Protection  Recent Flowsheet Documentation  Taken 5/14/2023 1800 by Oxana Naik RN  Head of Bed (HOB) Positioning: HOB elevated  Taken 5/14/2023 1600 by Oxana Niak RN  Head of Bed (HOB) Positioning: HOB elevated  Taken 5/14/2023 1400 by Oxana Naik RN  Head of Bed (HOB) Positioning: HOB elevated  Taken 5/14/2023 1200 by Oxana Naik RN  Head of Bed (HOB) Positioning: HOB elevated  Taken 5/14/2023 1000 by Oxana Naik RN  Head of Bed (HOB) Positioning: HOB elevated  Taken 5/14/2023 0800 by Oxana Naik RN  Head of Bed (HOB) Positioning: HOB elevated     Problem: Fall Injury Risk  Goal: Absence of Fall and Fall-Related Injury  Outcome: Ongoing, Progressing  Intervention: Promote Injury-Free Environment  Recent Flowsheet Documentation  Taken 5/14/2023 1800 by Oxana Naik RN  Safety Promotion/Fall Prevention:   activity supervised   assistive device/personal items within reach   clutter free environment maintained   safety round/check completed  Taken 5/14/2023 1600 by Oxana Naik RN  Safety Promotion/Fall Prevention:   activity supervised   assistive device/personal items within reach   clutter free environment maintained   safety round/check completed  Taken 5/14/2023 1400 by Oxana Naik RN  Safety Promotion/Fall Prevention:   activity supervised   clutter free environment maintained   assistive device/personal items within reach   safety round/check completed  Taken 5/14/2023 1200 by Oxana Naik RN  Safety Promotion/Fall Prevention:   activity supervised   assistive device/personal items  within reach   clutter free environment maintained   safety round/check completed  Taken 5/14/2023 1000 by Oxana Naik, RN  Safety Promotion/Fall Prevention:   activity supervised   assistive device/personal items within reach   clutter free environment maintained   safety round/check completed  Taken 5/14/2023 0800 by Oxana Naik, RN  Safety Promotion/Fall Prevention:   activity supervised   assistive device/personal items within reach   clutter free environment maintained   safety round/check completed     Problem: Pain Acute  Goal: Acceptable Pain Control and Functional Ability  Outcome: Ongoing, Progressing   Goal Outcome Evaluation:  Plan of Care Reviewed With: patient        Progress: improving

## 2023-05-14 NOTE — PLAN OF CARE
Goal Outcome Evaluation:  Plan of Care Reviewed With: patient        Progress: no change  Outcome Evaluation: no acute events during shift. VSS, afib on tele. BP slightly elevated. 2L nc. PRN anxiety meds given. no other changes in pt condition. will continue to monitor.

## 2023-05-14 NOTE — NURSING NOTE
Daily Care Plan Summary: Heart Failure    Diuretic in use (IV or PO):   IV        Daily weight (up or down):    loss: 11lbs      Output > Intake (yes/no):Yes      O2 Requirements (current, any change?): 2 liters/min via nasal cannula      Symptoms noted with Activity (Respiratory Tolerance, functional state):    pain increase      Anticipated Discharge Plans:    [place]

## 2023-05-14 NOTE — THERAPY EVALUATION
PT attempted evaluation; however, patient reports she is feeling good and does not want to risk working with PT, as it might make her start hurting.  She did agree to perform ankle pumps and heel slides.  She requires CGA to perform heel slides with her right leg and JOSEPH Johnston was instructed how to help if the patient requests assistance later today.  Patient reports that if she does not get discharged tomorrow she will try to work with therapy.  PT will follow up tomorrow.

## 2023-05-14 NOTE — PROGRESS NOTES
[facility]    PROGRESS NOTE    Name:  Breonna Gongora   Age:  87 y.o.  Sex:  female  :  1935  MRN:  [id number]   Visit Number:  [id number]  Admission Date:  2023  Date Of Service:  23  Primary Care Physician:  Provider, No Known     LOS: 3 days :    Chief Complaint:      Shortness of breath, swelling    Subjective:    Patient was seen and examined today at bedside.  Patient sitting up comfortably in bed with no distress.  Patient was sleeping when I entered the room but was easily arousable.  Patient at baseline mental status.  Patient reports improvement overall today.  Patient denies pain or discomfort.  Patient with no shortness of breath or chest pain.  Remains on 2 L of nasal cannula.  Otherwise hemodynamically stable and blood pressure has been more controlled today.  No family at bedside.    Hospital Course:    Patient is a chronically ill 87-year-old female with history significant for type 2 diabetes, hypertension, CHF who presents to the ER from [place] after complaining of shortness of breath.  Patient is on 2 L as needed and at night but has had to wear it more frequently and has also been complaining of abdominal pain and swelling. Patient daughter at bedside and states that patient has had approximate 25-30 pound weight gain since December.  Also confirmed that patient was supposed to be on oral Lasix 40 mg daily but this medication was not on current medication list from [place], suspect patient has not had her daily diuretic since December. Patient denies chest pain, productive cough, nausea/vomiting or diarrhea.    Upon arrival to the ER patient afebrile hypertensive at 209/111 and on baseline oxygen at 2 L with saturation in the mid 90s.  Significant labs include BNP 10,958.  Glucose 224.  Lactate lipase procalcitonin normal.  WBC 16.  Urinalysis positive for glucose protein trace bacteria.  CT of the chest and A/P shows cardiomegaly bilateral pleural  effusions, significant subcutaneous edema, anasarca.  It did show cholelithiasis for which gallbladder ultrasound obtained which confirmed gallstones but was otherwise unremarkable.  Patient did develop epistaxis in the emergency room, mild but Rhino Rocket placed.  Oxygen humidified. Patient received hydralazine and nitro patch in the ER as well as Lasix 40 mg IV.  Hospitalist service asked to admit.    Review of Systems:     All systems were reviewed and negative except as mentioned in subjective, assessment and plan.    Vital Signs:    Temp:  [98 °F (36.7 °C)-98.8 °F (37.1 °C)] 98.2 °F (36.8 °C)  Heart Rate:  [] 71  Resp:  [16-22] 16  BP: (108-194)/(68-94) 126/68    Intake and output:    I/O last 3 completed shifts:  In: 2354.6 [P.O.:600; I.V.:1754.6]  Out: 3400 [Urine:3400]  I/O this shift:  In: 600 [P.O.:600]  Out: -     Physical Examination:    General Appearance:  Alert and cooperative.  Chronically ill-appearing.  Obese.  Patient is hard of hearing.   Head:  Atraumatic and normocephalic. right nare with dried blood.   Eyes: Conjunctivae and sclerae normal, no icterus. No pallor.   Throat: No oral lesions, no thrush, oral mucosa moist.   Neck: Supple, trachea midline, no thyromegaly.   Lungs:   Breath sounds heard bilaterally equally.  No wheezing.  Faint bilateral crackles heard. No Pleural rub or bronchial breathing.   Heart:  Normal S1 and S2, no murmur, no gallop, no rub. No JVD.   Abdomen:   Normal bowel sounds, no masses, no organomegaly. Soft, nontender, nondistended, no rebound tenderness.   Extremities: Supple, 1+ bilateral lower extremity edema, no cyanosis, no clubbing.   Skin: No bleeding or rash.   Neurologic: Alert and oriented x 3. No facial asymmetry. Moves all four limbs. No tremors.      Laboratory results:    Results from last 7 days   Lab Units 05/14/23  0651 05/13/23  1626 05/13/23  0559 05/12/23  0541 05/11/23  1523   SODIUM mmol/L 133*  --  136 136 138   POTASSIUM mmol/L 4.2 4.1  3.2* 3.6 4.3   CHLORIDE mmol/L 91*  --  93* 94* 98   CO2 mmol/L 33.2*  --  34.5* 31.1* 28.9   BUN mg/dL 18  --  17 16 16   CREATININE mg/dL 0.84  --  0.72 0.69 0.71   CALCIUM mg/dL 9.0  --  8.9 8.9 8.9   BILIRUBIN mg/dL  --   --   --   --  0.3   ALK PHOS U/L  --   --   --   --  72   ALT (SGPT) U/L  --   --   --   --  <5   AST (SGOT) U/L  --   --   --   --  11   GLUCOSE mg/dL 167*  --  287* 293* 224*     Results from last 7 days   Lab Units 05/14/23  0651 05/13/23  0559 05/12/23  0541   WBC 10*3/mm3 13.27* 13.03* 13.98*   HEMOGLOBIN g/dL 12.4 11.1* 12.8   HEMATOCRIT % 39.1 35.4 40.0   PLATELETS 10*3/mm3 243 224 234             Results from last 7 days   Lab Units 05/11/23  1654 05/11/23  1650   BLOODCX  No growth at 2 days No growth at 2 days     No results for input(s): PHART, HJO4ASF, PO2ART, NLH2LME, BASEEXCESS in the last 8760 hours.   I have reviewed the patient's laboratory results.    Radiology results:    No radiology results from the last 24 hrs  I have reviewed the patient's radiology reports.    Medication Review:     I have reviewed the patient's active and prn medications.     Problem List:      Acute on chronic congestive heart failure, unspecified heart failure type      Assessment:    1. Acute CHF exacerbation, POA  2. Hypertensive urgency, POA  3. Hyperglycemia, POA  4. Insulin-dependent type 2 diabetes  5. Paroxysmal A-fib on Eliquis  6. Anxiety/depression  7. Hypertension  8. Hypothyroidism  9. Obesity  10. Impaired mobility and ADLs    Plan:    Acute CHF exacerbation  Hypertensive urgency, improved  - Heart failure pathway initiated  - 2D echo with EF of 66 to 70% and grade 3 diastolic dysfunction, moderate to severe pulmonary hypertension moderate calcification of aortic valve  - Strict I's and O's, IV diuresis with Lasix 40 mg twice daily  -Patient at -1.  Balance.  - Nitro paste discontinued, started nitroglycerin drip which was discontinued after starting and adjusting her p.o. hypertension  medicine.   -Continue amlodipine 10 mg and lisinopril 40 mg.   -Hydralazine as needed  - Fluid restriction  -Pro-Nehemiah negative, no indication for antibiotics, WBC trending down.     Hyperglycemia/type 2 diabetes  - A1c 9.1  - Basal Levemir increased to 20 units daily  - Accu-Cheks ACHS and sliding scale insulin    Hypomagnesemia  Hypokalemia  -Replace per protocol    Cerumen impaction  -Debrox ordered    Epistaxis, resolved  -Status post packing, no more bleeding.  -Humidify oxygen     Supportive care, further orders as clinical course dictates.  PT/OT.     DVT Prophylaxis: Lovenox  Code Status: DNR  Diet: Cardiac/diabetic, fluid restriction  Discharge Plan: Likely back to nursing home in 1 to 2 days.    Jasmeet Lombardi MD  05/14/23  14:36 EDT    Dictated utilizing Dragon dictation.

## 2023-05-15 ENCOUNTER — TELEPHONE (OUTPATIENT)
Dept: INTERNAL MEDICINE | Facility: CLINIC | Age: 88
End: 2023-05-15
Payer: MEDICARE

## 2023-05-15 LAB
ANION GAP SERPL CALCULATED.3IONS-SCNC: 8.3 MMOL/L (ref 5–15)
BUN SERPL-MCNC: 26 MG/DL (ref 8–23)
BUN/CREAT SERPL: 27.1 (ref 7–25)
CALCIUM SPEC-SCNC: 8.9 MG/DL (ref 8.6–10.5)
CHLORIDE SERPL-SCNC: 94 MMOL/L (ref 98–107)
CO2 SERPL-SCNC: 35.7 MMOL/L (ref 22–29)
CREAT SERPL-MCNC: 0.96 MG/DL (ref 0.57–1)
DEPRECATED RDW RBC AUTO: 53 FL (ref 37–54)
EGFRCR SERPLBLD CKD-EPI 2021: 57.4 ML/MIN/1.73
ERYTHROCYTE [DISTWIDTH] IN BLOOD BY AUTOMATED COUNT: 15.9 % (ref 12.3–15.4)
GLUCOSE BLDC GLUCOMTR-MCNC: 124 MG/DL (ref 70–130)
GLUCOSE BLDC GLUCOMTR-MCNC: 173 MG/DL (ref 70–130)
GLUCOSE BLDC GLUCOMTR-MCNC: 177 MG/DL (ref 70–130)
GLUCOSE BLDC GLUCOMTR-MCNC: 181 MG/DL (ref 70–130)
GLUCOSE SERPL-MCNC: 127 MG/DL (ref 65–99)
HCT VFR BLD AUTO: 38.9 % (ref 34–46.6)
HGB BLD-MCNC: 12 G/DL (ref 12–15.9)
MAGNESIUM SERPL-MCNC: 2.1 MG/DL (ref 1.6–2.4)
MCH RBC QN AUTO: 28 PG (ref 26.6–33)
MCHC RBC AUTO-ENTMCNC: 30.8 G/DL (ref 31.5–35.7)
MCV RBC AUTO: 90.9 FL (ref 79–97)
PLATELET # BLD AUTO: 240 10*3/MM3 (ref 140–450)
PMV BLD AUTO: 12.5 FL (ref 6–12)
POTASSIUM SERPL-SCNC: 4 MMOL/L (ref 3.5–5.2)
RBC # BLD AUTO: 4.28 10*6/MM3 (ref 3.77–5.28)
SODIUM SERPL-SCNC: 138 MMOL/L (ref 136–145)
WBC NRBC COR # BLD: 9.74 10*3/MM3 (ref 3.4–10.8)

## 2023-05-15 PROCEDURE — 63710000001 INSULIN DETEMIR PER 5 UNITS: Performed by: FAMILY MEDICINE

## 2023-05-15 PROCEDURE — 99232 SBSQ HOSP IP/OBS MODERATE 35: CPT | Performed by: FAMILY MEDICINE

## 2023-05-15 PROCEDURE — 85027 COMPLETE CBC AUTOMATED: CPT | Performed by: FAMILY MEDICINE

## 2023-05-15 PROCEDURE — 83735 ASSAY OF MAGNESIUM: CPT | Performed by: FAMILY MEDICINE

## 2023-05-15 PROCEDURE — 63710000001 INSULIN ASPART PER 5 UNITS: Performed by: INTERNAL MEDICINE

## 2023-05-15 PROCEDURE — 80048 BASIC METABOLIC PNL TOTAL CA: CPT | Performed by: FAMILY MEDICINE

## 2023-05-15 PROCEDURE — 82948 REAGENT STRIP/BLOOD GLUCOSE: CPT

## 2023-05-15 PROCEDURE — 25010000002 FUROSEMIDE PER 20 MG: Performed by: INTERNAL MEDICINE

## 2023-05-15 RX ADMIN — SUCRALFATE 1 G: 1 TABLET ORAL at 17:23

## 2023-05-15 RX ADMIN — LORAZEPAM 0.5 MG: 0.5 TABLET ORAL at 22:30

## 2023-05-15 RX ADMIN — INSULIN ASPART 2 UNITS: 100 INJECTION, SOLUTION INTRAVENOUS; SUBCUTANEOUS at 12:01

## 2023-05-15 RX ADMIN — BISACODYL 5 MG: 5 TABLET, COATED ORAL at 09:28

## 2023-05-15 RX ADMIN — Medication 10 ML: at 20:54

## 2023-05-15 RX ADMIN — Medication 10 ML: at 08:27

## 2023-05-15 RX ADMIN — Medication 5 MG: at 20:54

## 2023-05-15 RX ADMIN — Medication 5 DROP: at 08:26

## 2023-05-15 RX ADMIN — SUCRALFATE 1 G: 1 TABLET ORAL at 20:54

## 2023-05-15 RX ADMIN — BISACODYL 10 MG: 10 SUPPOSITORY RECTAL at 16:50

## 2023-05-15 RX ADMIN — CHLORDIAZEPOXIDE HYDROCHLORIDE 5 MG: 5 CAPSULE ORAL at 08:25

## 2023-05-15 RX ADMIN — INSULIN DETEMIR 20 UNITS: 100 INJECTION, SOLUTION SUBCUTANEOUS at 21:38

## 2023-05-15 RX ADMIN — MIRTAZAPINE 15 MG: 15 TABLET, ORALLY DISINTEGRATING ORAL at 21:38

## 2023-05-15 RX ADMIN — SERTRALINE 50 MG: 50 TABLET, FILM COATED ORAL at 08:25

## 2023-05-15 RX ADMIN — FUROSEMIDE 40 MG: 10 INJECTION, SOLUTION INTRAMUSCULAR; INTRAVENOUS at 13:11

## 2023-05-15 RX ADMIN — ISOSORBIDE MONONITRATE 60 MG: 60 TABLET, EXTENDED RELEASE ORAL at 08:26

## 2023-05-15 RX ADMIN — APIXABAN 5 MG: 5 TABLET, FILM COATED ORAL at 20:54

## 2023-05-15 RX ADMIN — DOCUSATE SODIUM 100 MG: 100 CAPSULE, LIQUID FILLED ORAL at 08:26

## 2023-05-15 RX ADMIN — CHLORDIAZEPOXIDE HYDROCHLORIDE 5 MG: 5 CAPSULE ORAL at 20:54

## 2023-05-15 RX ADMIN — SENNOSIDES AND DOCUSATE SODIUM 2 TABLET: 50; 8.6 TABLET ORAL at 08:25

## 2023-05-15 RX ADMIN — HYDROCODONE BITARTRATE AND ACETAMINOPHEN 1 TABLET: 5; 325 TABLET ORAL at 09:28

## 2023-05-15 RX ADMIN — AMLODIPINE BESYLATE 10 MG: 5 TABLET ORAL at 08:26

## 2023-05-15 RX ADMIN — SUCRALFATE 1 G: 1 TABLET ORAL at 08:25

## 2023-05-15 RX ADMIN — LEVOTHYROXINE SODIUM 75 MCG: 75 TABLET ORAL at 08:26

## 2023-05-15 RX ADMIN — SENNOSIDES AND DOCUSATE SODIUM 2 TABLET: 50; 8.6 TABLET ORAL at 20:54

## 2023-05-15 RX ADMIN — FUROSEMIDE 40 MG: 10 INJECTION, SOLUTION INTRAMUSCULAR; INTRAVENOUS at 01:04

## 2023-05-15 RX ADMIN — HYDROCODONE BITARTRATE AND ACETAMINOPHEN 1 TABLET: 5; 325 TABLET ORAL at 20:54

## 2023-05-15 RX ADMIN — CLOPIDOGREL BISULFATE 75 MG: 75 TABLET ORAL at 08:25

## 2023-05-15 RX ADMIN — APIXABAN 5 MG: 5 TABLET, FILM COATED ORAL at 08:26

## 2023-05-15 RX ADMIN — SUCRALFATE 1 G: 1 TABLET ORAL at 12:02

## 2023-05-15 RX ADMIN — DOCUSATE SODIUM 100 MG: 100 CAPSULE, LIQUID FILLED ORAL at 20:54

## 2023-05-15 RX ADMIN — LISINOPRIL 40 MG: 20 TABLET ORAL at 08:25

## 2023-05-15 RX ADMIN — INSULIN ASPART 2 UNITS: 100 INJECTION, SOLUTION INTRAVENOUS; SUBCUTANEOUS at 16:51

## 2023-05-15 RX ADMIN — Medication 5 DROP: at 20:55

## 2023-05-15 NOTE — TELEPHONE ENCOUNTER
I RECEIVED A TELEPHONE CALL FROM SHAHNAZ WATSON (DAUGHTER/POA) OF MARYA WOLF. MARYA IS A RESIDENT OF YOUR'S AT [facility]. HER DAUGHTER WOULD LIKE FOR YOU TO CALL HER ASAP AS SHE HAS MEDICATION CONCERNS AT THE NURSING HOME. MARYA IS CURRENTLY ADMITTED AT [facility] BUT MAY READMIT BACK TO [place] TODAY. SHE SAID SHE HAS HAD TROUBLE TRYING TO GET [facility]) TO TALK ABOUT THE CONCERNS SHE HAS.     PLEASE CALL HER EARLY THIS AFTERNOON IF YOU CAN.    SHAHNAZ [phone]    THANK YOU.

## 2023-05-15 NOTE — THERAPY DISCHARGE NOTE
PT evaluation attempted.  Patient declined participation, stating she is too tired.  PT has attempted several times and patient declined working with therapy.  PT will sign off at this time.

## 2023-05-15 NOTE — PROGRESS NOTES
[facility]    Name:  Breonna Gongora   Age:  87 y.o.  Sex:  female  :  1935  MRN:  [id number]   Visit Number:  [id number]  Admission Date:  2023  Date Of Service:  05/15/23  Primary Care Physician:  Provider, No Known     LOS: 4 days :    Chief Complaint:      Shortness of breath, swelling    Subjective:    Patient was seen and examined today at bedside.  Patient sitting up comfortably in bed with no distress.  Patient still has intermittent on and off right sided abdominal/flank pain, she reports that she has not had a bowel movement since she came into the hospital.  However per nursing patient has had a bowel movement yesterday.  Advised nursing on continuing bowel regimen and monitor bowel movements. Patient had bilateral irrigation which was unsuccessful for cerumen removal.  I performed manual cerumen removal with curette, tolerated well, advised on continuing Debrox. Patient denies pain or discomfort currently. Patient with no shortness of breath or chest pain.  Remains on 2 L of nasal cannula.  Otherwise hemodynamically stable and blood pressure controlled.  Daughter at bedside.  Discussed management plan with her and her daughter.    Hospital Course:    Patient is a chronically ill 87-year-old female with history significant for type 2 diabetes, hypertension, CHF who presents to the ER from [place] after complaining of shortness of breath.  Patient is on 2 L as needed and at night but has had to wear it more frequently and has also been complaining of abdominal pain and swelling. Patient daughter at bedside and states that patient has had approximate 25-30 pound weight gain since December.  Also confirmed that patient was supposed to be on oral Lasix 40 mg daily but this medication was not on current medication list from [place], suspect patient has not had her daily diuretic since December. Patient denies chest pain, productive cough,  nausea/vomiting or diarrhea.    Upon arrival to the ER patient afebrile hypertensive at 209/111 and on baseline oxygen at 2 L with saturation in the mid 90s.  Significant labs include BNP 10,958.  Glucose 224.  Lactate lipase procalcitonin normal.  WBC 16.  Urinalysis positive for glucose protein trace bacteria.  CT of the chest and A/P shows cardiomegaly bilateral pleural effusions, significant subcutaneous edema, anasarca.  It did show cholelithiasis for which gallbladder ultrasound obtained which confirmed gallstones but was otherwise unremarkable.  Patient did develop epistaxis in the emergency room, mild but Rhino Rocket placed.  Oxygen humidified. Patient received hydralazine and nitro patch in the ER as well as Lasix 40 mg IV.  Hospitalist service asked to admit.    2D echo with EF of 66 to 70% and grade 3 diastolic dysfunction, moderate to severe pulmonary hypertension moderate calcification of aortic valve.  Patient diuresed with Lasix 40 mg twice daily.  Patient was initially started on nitroglycerin drip which was discontinued after starting and adjusting her p.o. hypertension medicine.  Lisinopril was increased to 40 mg and amlodipine 10 mg restarted.  Epistaxis improved and nose packing was removed.  Recommended humidified oxygen.  Patient with cerumen impaction, failed to completely remove all cerumen manually or by irrigation.  Recommended continuing Debrox for couple more days and follow-up with PCP for removal.    Review of Systems:     All systems were reviewed and negative except as mentioned in subjective, assessment and plan.    Vital Signs:    Temp:  [97.3 °F (36.3 °C)-98.3 °F (36.8 °C)] 97.3 °F (36.3 °C)  Heart Rate:  [73-92] 76  Resp:  [16-18] 18  BP: (139-160)/(75-94) 151/78    Intake and output:    I/O last 3 completed shifts:  In: 840 [P.O.:840]  Out: 1200 [Urine:1200]  I/O this shift:  In: 600 [P.O.:600]  Out: -     Physical Examination:    General Appearance:  Alert and cooperative.   Chronically ill-appearing.  Obese.  Patient is hard of hearing.   Head:  Atraumatic and normocephalic. right nare with dried blood.   Eyes: Conjunctivae and sclerae normal, no icterus. No pallor.   Throat: No oral lesions, no thrush, oral mucosa moist.   Neck: Supple, trachea midline, no thyromegaly.   Lungs:   Breath sounds heard bilaterally equally.  No wheezing.  Faint bilateral crackles heard. No Pleural rub or bronchial breathing.   Heart:  Normal S1 and S2, no murmur, no gallop, no rub. No JVD.   Abdomen:   Normal bowel sounds, no masses, no organomegaly. Soft, nontender, nondistended, no rebound tenderness.   Extremities: Supple, 1+ bilateral lower extremity edema, no cyanosis, no clubbing.   Skin: No bleeding or rash.   Neurologic: Alert and oriented x 3. No facial asymmetry. Moves all four limbs. No tremors.      Laboratory results:    Results from last 7 days   Lab Units 05/15/23  0608 05/14/23  0651 05/13/23  1626 05/13/23  0559 05/12/23  0541 05/11/23  1523   SODIUM mmol/L 138 133*  --  136   < > 138   POTASSIUM mmol/L 4.0 4.2 4.1 3.2*   < > 4.3   CHLORIDE mmol/L 94* 91*  --  93*   < > 98   CO2 mmol/L 35.7* 33.2*  --  34.5*   < > 28.9   BUN mg/dL 26* 18  --  17   < > 16   CREATININE mg/dL 0.96 0.84  --  0.72   < > 0.71   CALCIUM mg/dL 8.9 9.0  --  8.9   < > 8.9   BILIRUBIN mg/dL  --   --   --   --   --  0.3   ALK PHOS U/L  --   --   --   --   --  72   ALT (SGPT) U/L  --   --   --   --   --  <5   AST (SGOT) U/L  --   --   --   --   --  11   GLUCOSE mg/dL 127* 167*  --  287*   < > 224*    < > = values in this interval not displayed.     Results from last 7 days   Lab Units 05/15/23  0608 05/14/23  0651 05/13/23  0559   WBC 10*3/mm3 9.74 13.27* 13.03*   HEMOGLOBIN g/dL 12.0 12.4 11.1*   HEMATOCRIT % 38.9 39.1 35.4   PLATELETS 10*3/mm3 240 243 224             Results from last 7 days   Lab Units 05/11/23  1654 05/11/23  1650   BLOODCX  No growth at 3 days No growth at 3 days     No results for input(s):  PHART, IRO2LKF, PO2ART, AYX0OXA, BASEEXCESS in the last 8760 hours.   I have reviewed the patient's laboratory results.    Radiology results:    No radiology results from the last 24 hrs  I have reviewed the patient's radiology reports.    Medication Review:     I have reviewed the patient's active and prn medications.     Problem List:      Acute on chronic congestive heart failure, unspecified heart failure type      Assessment:    1. Acute CHF exacerbation, POA  2. Hypertensive urgency, POA  3. Hyperglycemia, POA  4. Insulin-dependent type 2 diabetes  5. Paroxysmal A-fib on Eliquis  6. Anxiety/depression  7. Hypertension  8. Hypothyroidism  9. Obesity  10. Impaired mobility and ADLs    Plan:    Acute CHF exacerbation  Hypertensive urgency, improved  - Heart failure pathway initiated  - 2D echo with EF of 66 to 70% and grade 3 diastolic dysfunction, moderate to severe pulmonary hypertension moderate calcification of aortic valve  - Strict I's and O's, IV diuresis with Lasix 40 mg twice daily  -Patient at -1.  Balance.  - Nitro paste discontinued, started nitroglycerin drip which was discontinued after starting and adjusting her p.o. hypertension medicine.   -Continue amlodipine 10 mg and lisinopril 40 mg.   -Hydralazine as needed  - Fluid restriction  -Pro-Nehemiah negative, no indication for antibiotics, WBC trending down.     Hyperglycemia/type 2 diabetes  - A1c 9.1  - Basal Levemir increased to 20 units daily  - Accu-Cheks ACHS and sliding scale insulin    Hypomagnesemia  Hypokalemia  -Replace per protocol    Cerumen impaction  -Irrigation and minimal cerumen removal was minimally helpful.  -Continue Debrox for couple more days and follow-up with PCP    Epistaxis, resolved  -Status post packing, no more bleeding.  -Humidify oxygen     Supportive care, further orders as clinical course dictates.  PT/OT.     DVT Prophylaxis: Lovenox  Code Status: DNR  Diet: Cardiac/diabetic, fluid restriction  Discharge Plan: Likely  back to nursing home in a.Lev Lombardi MD  05/15/23  15:19 EDT    Dictated utilizing Dragon dictation.

## 2023-05-15 NOTE — PLAN OF CARE
Goal Outcome Evaluation:  Plan of Care Reviewed With: patient        Progress: improving  Outcome Evaluation: no acute events during shift. VSS, BP controlled, afib on tele. 2L nc. pt expresses concern about not being ready for d/c. pt condition is improving. no other change in pt condition. will continue to monitor.

## 2023-05-15 NOTE — PLAN OF CARE
Goal Outcome Evaluation:  Plan of Care Reviewed With: patient        Progress: improving  Outcome Evaluation: no acute events during shift. VSS, BP controlled, afib on tele. 2L nc. pt expresses concern about not being ready for d/c. pt condition is improving. no other change in pt condition. will continue to monitor.         Daily Care Plan Summary: Heart Failure    Diuretic in use (IV or PO):   IV        Daily weight (up or down):    gain: 0.44lbs      Output > Intake (yes/no):Yes      O2 Requirements (current, any change?):  2 liters/min via nasal cannula      Symptoms noted with Activity (Respiratory Tolerance, functional state):     INCREASED PAIN      Anticipated Discharge Plans:     [facility]

## 2023-05-15 NOTE — THERAPY DISCHARGE NOTE
Patient sleeping soundly upon therapist entering room. Daughter is present at bedside and reports patient is total care for ADLs and transfers at [facility], including self feeding. OT order to be discontinued d/t patient at baseline functional level.

## 2023-05-15 NOTE — CASE MANAGEMENT/SOCIAL WORK
Case Management/Social Work    Patient Name:  Breonna Gongora  YOB: 1935  MRN: [id number]  Admit Date:  5/11/2023      SW spoke with Gloria at [facility]. Pt. is able to discharge there tomorrow 5/16. JEROD/BLAYNE will continue to follow for discharge planning.       Electronically signed by:  Yehuda Millan  05/15/23 14:51 EDT

## 2023-05-15 NOTE — PLAN OF CARE
Problem: Adjustment to Illness (Heart Failure)  Goal: Optimal Coping  Outcome: Ongoing, Progressing     Problem: Cardiac Output Decreased (Heart Failure)  Goal: Optimal Cardiac Output  Outcome: Ongoing, Progressing     Problem: Dysrhythmia (Heart Failure)  Goal: Stable Heart Rate and Rhythm  Outcome: Ongoing, Progressing     Problem: Fluid Imbalance (Heart Failure)  Goal: Fluid Balance  Outcome: Ongoing, Progressing     Problem: Functional Ability Impaired (Heart Failure)  Goal: Optimal Functional Ability  Outcome: Ongoing, Progressing  Intervention: Optimize Functional Ability  Recent Flowsheet Documentation  Taken 5/15/2023 1800 by Oxana Naik RN  Activity Management: bedrest  Taken 5/15/2023 1600 by Oxana Naik RN  Activity Management: activity encouraged  Taken 5/15/2023 1400 by Oxana Naik RN  Activity Management: activity encouraged  Taken 5/15/2023 1200 by Oxana Naik RN  Activity Management: activity encouraged  Taken 5/15/2023 1000 by Oxana Naik RN  Activity Management: activity encouraged  Taken 5/15/2023 0800 by Oxana Naik RN  Activity Management: activity encouraged     Problem: Oral Intake Inadequate (Heart Failure)  Goal: Optimal Nutrition Intake  Outcome: Ongoing, Progressing     Problem: Respiratory Compromise (Heart Failure)  Goal: Effective Oxygenation and Ventilation  Outcome: Ongoing, Progressing     Problem: Sleep Disordered Breathing (Heart Failure)  Goal: Effective Breathing Pattern During Sleep  Outcome: Ongoing, Progressing     Problem: Adult Inpatient Plan of Care  Goal: Plan of Care Review  Outcome: Ongoing, Progressing  Flowsheets (Taken 5/15/2023 1849)  Progress: improving  Plan of Care Reviewed With: patient  Goal: Patient-Specific Goal (Individualized)  Outcome: Ongoing, Progressing  Goal: Absence of Hospital-Acquired Illness or Injury  Outcome: Ongoing, Progressing  Intervention: Identify and Manage Fall Risk  Recent Flowsheet  Documentation  Taken 5/15/2023 1800 by Oxana Naik RN  Safety Promotion/Fall Prevention:   assistive device/personal items within reach   activity supervised   clutter free environment maintained   safety round/check completed  Taken 5/15/2023 1600 by Oxana Naik RN  Safety Promotion/Fall Prevention:   activity supervised   assistive device/personal items within reach   clutter free environment maintained   safety round/check completed  Taken 5/15/2023 1400 by Oxana Naik RN  Safety Promotion/Fall Prevention:   activity supervised   assistive device/personal items within reach   clutter free environment maintained   safety round/check completed  Taken 5/15/2023 1200 by Oxana Naik RN  Safety Promotion/Fall Prevention:   activity supervised   assistive device/personal items within reach   clutter free environment maintained   safety round/check completed  Taken 5/15/2023 1000 by Oxana Naik RN  Safety Promotion/Fall Prevention:   activity supervised   assistive device/personal items within reach   clutter free environment maintained   safety round/check completed  Taken 5/15/2023 0800 by Oxana Naik RN  Safety Promotion/Fall Prevention:   activity supervised   assistive device/personal items within reach   clutter free environment maintained   safety round/check completed  Intervention: Prevent Skin Injury  Recent Flowsheet Documentation  Taken 5/15/2023 1800 by Oxana Naik RN  Body Position: patient/family refused  Taken 5/15/2023 1600 by Oxana Naik RN  Body Position:   turned   left  Taken 5/15/2023 1400 by Oxana Naik RN  Body Position: weight shifting  Taken 5/15/2023 1200 by Oxana Naik RN  Body Position:   supine   patient/family refused  Taken 5/15/2023 1000 by Oxana Naik RN  Body Position: weight shifting  Taken 5/15/2023 0800 by Oxana Naik RN  Body Position:   turned   left  Intervention: Prevent and Manage VTE  (Venous Thromboembolism) Risk  Recent Flowsheet Documentation  Taken 5/15/2023 1800 by Oxana Naik RN  Activity Management: bedrest  Taken 5/15/2023 1600 by Oxana Naik RN  Activity Management: activity encouraged  Taken 5/15/2023 1400 by Oxana Naik RN  Activity Management: activity encouraged  Taken 5/15/2023 1200 by Oxana Naik RN  Activity Management: activity encouraged  Taken 5/15/2023 1000 by Oxana Naik RN  Activity Management: activity encouraged  Taken 5/15/2023 0800 by Oxana Naik RN  Activity Management: activity encouraged  Intervention: Prevent Infection  Recent Flowsheet Documentation  Taken 5/15/2023 1800 by Oxana Naik RN  Infection Prevention:   environmental surveillance performed   equipment surfaces disinfected   hand hygiene promoted   single patient room provided  Taken 5/15/2023 1600 by Oxana Naik RN  Infection Prevention:   environmental surveillance performed   equipment surfaces disinfected   hand hygiene promoted   single patient room provided  Taken 5/15/2023 1400 by Oxana Naik RN  Infection Prevention:   environmental surveillance performed   equipment surfaces disinfected   hand hygiene promoted   single patient room provided  Taken 5/15/2023 1200 by Oxana Naik RN  Infection Prevention:   environmental surveillance performed   equipment surfaces disinfected   hand hygiene promoted   single patient room provided  Taken 5/15/2023 1000 by Oxana Naik RN  Infection Prevention:   environmental surveillance performed   equipment surfaces disinfected   hand hygiene promoted   single patient room provided  Taken 5/15/2023 0800 by Oxana Naik RN  Infection Prevention:   environmental surveillance performed   equipment surfaces disinfected   hand hygiene promoted   single patient room provided  Goal: Optimal Comfort and Wellbeing  Outcome: Ongoing, Progressing  Goal: Readiness for Transition of  Care  Outcome: Ongoing, Progressing     Problem: Hypertension Acute  Goal: Blood Pressure Within Desired Range  Outcome: Ongoing, Progressing     Problem: Fluid Volume Excess  Goal: Fluid Balance  Outcome: Ongoing, Progressing     Problem: Diabetes Comorbidity  Goal: Blood Glucose Level Within Targeted Range  Outcome: Ongoing, Progressing     Problem: Heart Failure Comorbidity  Goal: Maintenance of Heart Failure Symptom Control  Outcome: Ongoing, Progressing     Problem: Hypertension Comorbidity  Goal: Blood Pressure in Desired Range  Outcome: Ongoing, Progressing     Problem: Osteoarthritis Comorbidity  Goal: Maintenance of Osteoarthritis Symptom Control  Outcome: Ongoing, Progressing  Intervention: Maintain Osteoarthritis Symptom Control  Recent Flowsheet Documentation  Taken 5/15/2023 1800 by Oxana Naik RN  Activity Management: bedrest  Taken 5/15/2023 1600 by Oxana Naik RN  Activity Management: activity encouraged  Taken 5/15/2023 1400 by Oxana Naik RN  Activity Management: activity encouraged  Taken 5/15/2023 1200 by Oxana Naik RN  Activity Management: activity encouraged  Taken 5/15/2023 1000 by Oxana Naik RN  Activity Management: activity encouraged  Taken 5/15/2023 0800 by Oxana Naik RN  Activity Management: activity encouraged     Problem: Skin Injury Risk Increased  Goal: Skin Health and Integrity  Outcome: Ongoing, Progressing  Intervention: Optimize Skin Protection  Recent Flowsheet Documentation  Taken 5/15/2023 1800 by Oxana Naik RN  Head of Bed (HOB) Positioning: HOB elevated  Taken 5/15/2023 1600 by Oxana Naik RN  Head of Bed (HOB) Positioning: HOB elevated  Taken 5/15/2023 1400 by Oxana Naik RN  Head of Bed (HOB) Positioning: HOB elevated  Taken 5/15/2023 1200 by Oxana Naik RN  Head of Bed (HOB) Positioning: HOB elevated  Taken 5/15/2023 1000 by Oxana Naik RN  Head of Bed (HOB) Positioning: HOB  elevated  Taken 5/15/2023 0800 by Oxana Naik RN  Head of Bed (HOB) Positioning: HOB elevated     Problem: Fall Injury Risk  Goal: Absence of Fall and Fall-Related Injury  Outcome: Ongoing, Progressing  Intervention: Promote Injury-Free Environment  Recent Flowsheet Documentation  Taken 5/15/2023 1800 by Oxana Naik RN  Safety Promotion/Fall Prevention:   assistive device/personal items within reach   activity supervised   clutter free environment maintained   safety round/check completed  Taken 5/15/2023 1600 by Oxana Naik RN  Safety Promotion/Fall Prevention:   activity supervised   assistive device/personal items within reach   clutter free environment maintained   safety round/check completed  Taken 5/15/2023 1400 by Oxana Naik RN  Safety Promotion/Fall Prevention:   activity supervised   assistive device/personal items within reach   clutter free environment maintained   safety round/check completed  Taken 5/15/2023 1200 by Oxana Naik RN  Safety Promotion/Fall Prevention:   activity supervised   assistive device/personal items within reach   clutter free environment maintained   safety round/check completed  Taken 5/15/2023 1000 by Oxana Naik RN  Safety Promotion/Fall Prevention:   activity supervised   assistive device/personal items within reach   clutter free environment maintained   safety round/check completed  Taken 5/15/2023 0800 by Oxana Naik RN  Safety Promotion/Fall Prevention:   activity supervised   assistive device/personal items within reach   clutter free environment maintained   safety round/check completed     Problem: Pain Acute  Goal: Acceptable Pain Control and Functional Ability  Outcome: Ongoing, Progressing   Goal Outcome Evaluation:  Plan of Care Reviewed With: patient        Progress: improving       Plan of care discussed with patient and daughter, vs noted. Patient had BM today, plan for DC to long term care tomorrow.

## 2023-05-16 VITALS
SYSTOLIC BLOOD PRESSURE: 156 MMHG | RESPIRATION RATE: 16 BRPM | HEIGHT: 60 IN | WEIGHT: 168.21 LBS | OXYGEN SATURATION: 93 % | BODY MASS INDEX: 33.02 KG/M2 | TEMPERATURE: 97.6 F | HEART RATE: 78 BPM | DIASTOLIC BLOOD PRESSURE: 92 MMHG

## 2023-05-16 PROBLEM — I50.33 ACUTE ON CHRONIC HEART FAILURE WITH PRESERVED EJECTION FRACTION (HFPEF): Status: ACTIVE | Noted: 2023-05-16

## 2023-05-16 LAB
BACTERIA SPEC AEROBE CULT: NORMAL
BACTERIA SPEC AEROBE CULT: NORMAL
SARS-COV-2 RNA RESP QL NAA+PROBE: NOT DETECTED

## 2023-05-16 PROCEDURE — 25010000002 FUROSEMIDE PER 20 MG: Performed by: INTERNAL MEDICINE

## 2023-05-16 PROCEDURE — 87635 SARS-COV-2 COVID-19 AMP PRB: CPT | Performed by: INTERNAL MEDICINE

## 2023-05-16 PROCEDURE — 99239 HOSP IP/OBS DSCHRG MGMT >30: CPT | Performed by: INTERNAL MEDICINE

## 2023-05-16 PROCEDURE — 63710000001 INSULIN ASPART PER 5 UNITS: Performed by: INTERNAL MEDICINE

## 2023-05-16 RX ORDER — AMLODIPINE BESYLATE 10 MG/1
10 TABLET ORAL
Qty: 30 TABLET | Refills: 0 | Status: SHIPPED | OUTPATIENT
Start: 2023-05-17

## 2023-05-16 RX ORDER — CHLORDIAZEPOXIDE HYDROCHLORIDE 5 MG/1
5 CAPSULE, GELATIN COATED ORAL 2 TIMES DAILY
Qty: 8 CAPSULE | Refills: 0 | Status: SHIPPED | OUTPATIENT
Start: 2023-05-16 | End: 2023-05-23 | Stop reason: SDUPTHER

## 2023-05-16 RX ORDER — FUROSEMIDE 20 MG/1
20 TABLET ORAL 2 TIMES DAILY
Qty: 60 TABLET | Refills: 0 | Status: SHIPPED | OUTPATIENT
Start: 2023-05-16 | End: 2023-06-15

## 2023-05-16 RX ORDER — POLYETHYLENE GLYCOL 3350 17 G/17G
17 POWDER, FOR SOLUTION ORAL DAILY
Qty: 30 PACKET | Refills: 0 | Status: SHIPPED | OUTPATIENT
Start: 2023-05-16 | End: 2023-05-16 | Stop reason: HOSPADM

## 2023-05-16 RX ORDER — NALOXONE HYDROCHLORIDE 4 MG/.1ML
SPRAY NASAL
Qty: 2 EACH | Refills: 0 | Status: SHIPPED | OUTPATIENT
Start: 2023-05-16

## 2023-05-16 RX ADMIN — FUROSEMIDE 40 MG: 10 INJECTION, SOLUTION INTRAMUSCULAR; INTRAVENOUS at 01:33

## 2023-05-16 RX ADMIN — APIXABAN 5 MG: 5 TABLET, FILM COATED ORAL at 08:18

## 2023-05-16 RX ADMIN — CLOPIDOGREL BISULFATE 75 MG: 75 TABLET ORAL at 08:18

## 2023-05-16 RX ADMIN — DOCUSATE SODIUM 100 MG: 100 CAPSULE, LIQUID FILLED ORAL at 08:18

## 2023-05-16 RX ADMIN — PANTOPRAZOLE SODIUM 40 MG: 40 TABLET, DELAYED RELEASE ORAL at 06:40

## 2023-05-16 RX ADMIN — ACETAMINOPHEN 650 MG: 325 TABLET, FILM COATED ORAL at 01:33

## 2023-05-16 RX ADMIN — LISINOPRIL 40 MG: 20 TABLET ORAL at 08:18

## 2023-05-16 RX ADMIN — SENNOSIDES AND DOCUSATE SODIUM 2 TABLET: 50; 8.6 TABLET ORAL at 08:18

## 2023-05-16 RX ADMIN — ISOSORBIDE MONONITRATE 60 MG: 60 TABLET, EXTENDED RELEASE ORAL at 08:18

## 2023-05-16 RX ADMIN — CHLORDIAZEPOXIDE HYDROCHLORIDE 5 MG: 5 CAPSULE ORAL at 08:18

## 2023-05-16 RX ADMIN — AMLODIPINE BESYLATE 10 MG: 5 TABLET ORAL at 08:18

## 2023-05-16 RX ADMIN — LEVOTHYROXINE SODIUM 75 MCG: 75 TABLET ORAL at 08:18

## 2023-05-16 RX ADMIN — SERTRALINE 50 MG: 50 TABLET, FILM COATED ORAL at 08:17

## 2023-05-16 RX ADMIN — Medication 5 DROP: at 08:18

## 2023-05-16 RX ADMIN — SUCRALFATE 1 G: 1 TABLET ORAL at 08:18

## 2023-05-16 RX ADMIN — Medication 10 ML: at 08:21

## 2023-05-16 RX ADMIN — INSULIN ASPART 4 UNITS: 100 INJECTION, SOLUTION INTRAVENOUS; SUBCUTANEOUS at 08:18

## 2023-05-16 NOTE — DISCHARGE SUMMARY
[facility]   [facility]      Name:  Breonna Gongora   Age:  87 y.o.  Sex:  female  :  1935  MRN:  [id number]   Visit Number:  [id number]    Admission Date:  2023  Date of Discharge:  2023  Primary Care Physician:  Provider, No Known    Important issues to note:    Start: norvasc, debrox, furosemide  Stop: nothing  Follow up: PCP and cardiology  Brief Summary: Presented with soa due to aechf. Initially had volume overload which improved to her baseline by the time of discharge.    Discharge Diagnoses:       Acute on chronic congestive heart failure, unspecified heart failure type    Acute on chronic heart failure with preserved ejection fraction (HFpEF)        Problem List:     Active Hospital Problems    Diagnosis  POA   • **Acute on chronic congestive heart failure, unspecified heart failure type [I50.9]  Yes   • Acute on chronic heart failure with preserved ejection fraction (HFpEF) [I50.33]  Yes      Resolved Hospital Problems   No resolved problems to display.     Presenting Problem:    Chief Complaint   Patient presents with   • Abdominal Pain      Consults:     Consulting Physician(s)             None          Procedures Performed:            23: patient had a bm last night after a suppository. Has lost 11 lbs of fluid. Is reluctant to dc is fearful.    Hospital Course:     Patient is a chronically ill 87-year-old female with history significant for type 2 diabetes, hypertension, CHF who presents to the ER from [facility] after complaining of shortness of breath.  Patient is on 2 L as needed and at night but has had to wear it more frequently and has also been complaining of abdominal pain and swelling. Patient daughter at bedside and states that patient has had approximate 25-30 pound weight gain since December.  Also confirmed that patient was supposed to be on oral Lasix 40 mg daily but this medication was not on current medication list from [facility],  suspect patient has not had her daily diuretic since December. Patient denies chest pain, productive cough, nausea/vomiting or diarrhea.     Upon arrival to the ER patient afebrile hypertensive at 209/111 and on baseline oxygen at 2 L with saturation in the mid 90s.  Significant labs include BNP 10,958.  Glucose 224.  Lactate lipase procalcitonin normal.  WBC 16.  Urinalysis positive for glucose protein trace bacteria.  CT of the chest and A/P shows cardiomegaly bilateral pleural effusions, significant subcutaneous edema, anasarca.  It did show cholelithiasis for which gallbladder ultrasound obtained which confirmed gallstones but was otherwise unremarkable.  Patient did develop epistaxis in the emergency room, mild but Rhino Rocket placed.  Oxygen humidified. Patient received hydralazine and nitro patch in the ER as well as Lasix 40 mg IV.  Hospitalist service asked to admit.     2D echo with EF of 66 to 70% and grade 3 diastolic dysfunction, moderate to severe pulmonary hypertension moderate calcification of aortic valve.  Patient diuresed with Lasix 40 mg twice daily.  Patient was initially started on nitroglycerin drip which was discontinued after starting and adjusting her p.o. hypertension medicine.  Lisinopril was increased to 40 mg and amlodipine 10 mg restarted.  Epistaxis improved and nose packing was removed.  Recommended humidified oxygen.  Patient with cerumen impaction, failed to completely remove all cerumen manually or by irrigation.  Recommended continuing Debrox for couple more days and follow-up with PCP for removal.  Edited by: Jarvis Hampton,  at 5/16/2023 0846  Acute CHF exacerbation  Hypertensive urgency, improved  - Heart failure pathway initiated, stable for dc  - 2D echo with EF of 66 to 70% and grade 3 diastolic dysfunction, moderate to severe pulmonary hypertension moderate calcification of aortic valve  - Strict I's and O's, IV diuresis with Lasix 40 mg twice daily  -Patient at  -1.  Balance.  - Nitro paste discontinued, started nitroglycerin drip which was discontinued after starting and adjusting her p.o. hypertension medicine.   -Continue amlodipine 10 mg and lisinopril 40 mg.   -Hydralazine as needed  - Fluid restriction  -Pro-Nehemiah negative, no indication for antibiotics, WBC trending down.  --will prescribe oral lasix 20 mg bid for dc. She may need more to keep her euvolemic. Please monitor for any increase in fluid or shortness of breath.     Hyperglycemia/type 2 diabetes  - A1c 9.1  - Basal Levemir increased to 20 units daily  - Accu-Cheks ACHS and sliding scale insulin, transition back to previous home regimen with increase in basal insulin.     Hypomagnesemia  Hypokalemia  -Replaced per protocol     Cerumen impaction  -Irrigation and minimal cerumen removal was minimally helpful.  -Continue Debrox for couple more days and follow-up with PCP     Epistaxis, resolved  -Status post packing, no more bleeding.  -Humidify oxygen  Edited by: Jarvis Hampton,  at 5/16/2023 0846      Vital Signs:    Temp:  [97.3 °F (36.3 °C)-98.5 °F (36.9 °C)] 97.6 °F (36.4 °C)  Heart Rate:  [76-97] 78  Resp:  [16-18] 16  BP: (136-158)/(67-92) 156/92    Physical Exam:    General Appearance:  Alert and cooperative.  Chronically ill-appearing.  Obese.  Patient is hard of hearing.   Head:  Atraumatic and normocephalic. right nare with dried blood.   Eyes: Conjunctivae and sclerae normal, no icterus. No pallor.   Throat: No oral lesions, no thrush, oral mucosa moist.   Neck: Supple, trachea midline, no thyromegaly.   Lungs:   Breath sounds heard bilaterally equally.  No wheezing.  Faint bilateral crackles heard. No Pleural rub or bronchial breathing.   Heart:  Normal S1 and S2, no murmur, no gallop, no rub. No JVD.   Abdomen:   Normal bowel sounds, no masses, no organomegaly. Soft, nontender, nondistended, no rebound tenderness.   Extremities: Supple, 1+ bilateral lower extremity edema, no cyanosis, no  clubbing.   Skin: No bleeding or rash.   Neurologic: Alert and oriented x 3. No facial asymmetry. Moves all four limbs. No tremors.      Edited by: Jarvis Hampton DO at 5/16/2023 0846    Pertinent Lab Results:     Results from last 7 days   Lab Units 05/15/23  0608 05/14/23  0651 05/13/23  1626 05/13/23  0559 05/12/23  0541 05/11/23  1523   SODIUM mmol/L 138 133*  --  136   < > 138   POTASSIUM mmol/L 4.0 4.2 4.1 3.2*   < > 4.3   CHLORIDE mmol/L 94* 91*  --  93*   < > 98   CO2 mmol/L 35.7* 33.2*  --  34.5*   < > 28.9   BUN mg/dL 26* 18  --  17   < > 16   CREATININE mg/dL 0.96 0.84  --  0.72   < > 0.71   CALCIUM mg/dL 8.9 9.0  --  8.9   < > 8.9   BILIRUBIN mg/dL  --   --   --   --   --  0.3   ALK PHOS U/L  --   --   --   --   --  72   ALT (SGPT) U/L  --   --   --   --   --  <5   AST (SGOT) U/L  --   --   --   --   --  11   GLUCOSE mg/dL 127* 167*  --  287*   < > 224*    < > = values in this interval not displayed.     Results from last 7 days   Lab Units 05/15/23  0608 05/14/23  0651 05/13/23  0559   WBC 10*3/mm3 9.74 13.27* 13.03*   HEMOGLOBIN g/dL 12.0 12.4 11.1*   HEMATOCRIT % 38.9 39.1 35.4   PLATELETS 10*3/mm3 240 243 224             Results from last 7 days   Lab Units 05/11/23  1523   PROBNP pg/mL 10,958.0*         Results from last 7 days   Lab Units 05/11/23  1523   LIPASE U/L 16         Results from last 7 days   Lab Units 05/11/23  1654 05/11/23  1650   BLOODCX  No growth at 4 days No growth at 4 days       Pertinent Radiology Results:    Imaging Results (All)     Procedure Component Value Units Date/Time    XR Chest 1 View [[id number]] Collected: 05/12/23 0904     Updated: 05/12/23 0920    Narrative:      PROCEDURE: XR CHEST 1 VW-        HISTORY: Lower extremity edema     COMPARISON: None.     FINDINGS: The heart is mildly enlarged. The mediastinum is unremarkable.  There is a small left pleural effusion. There is left basilar  atelectasis or infiltrate. Bilateral interstitial edema is  consistent  with CHF. There is no pneumothorax. There are no acute osseous  abnormalities.       Impression:      Small left pleural effusion.     Left basilar atelectasis or infiltrate.     Bilateral interstitial edema consistent with CHF. Continued follow-up is  recommended.                       Images were reviewed, interpreted, and dictated by Dr. Deyanira العراقي MD  Transcribed by Farzaneh Calvillo PA-C.     This report was signed and finalized on 5/12/2023 9:18 AM by Deyanira العراقي MD.    US Gallbladder [[id number]] Collected: 05/11/23 2007     Updated: 05/11/23 2008    Narrative:      FINAL REPORT    TECHNIQUE:  Sonographic images of the right upper quadrant were obtained.    CLINICAL HISTORY:  Cholelithiasis on CT, RUQ tenderness    FINDINGS:  The gallbladder is contracted and appears stone filled. There is  no biliary ductal dilation.  The common bile duct is normal 4 mm  in diameter.  The visualized liver and right kidney are normal.  There is no ascites.      Impression:      Gallstones.  Otherwise unremarkable.    Authenticated and Electronically Signed by Pascual Palomo M.D. on  05/11/2023 08:07:31 PM    CT Chest Without Contrast Diagnostic [[id number]] Collected: 05/11/23 1627     Updated: 05/11/23 1629    Narrative:      PROCEDURE: CT CHEST ABDOMEN and PELVIS WO CONTRAST-     HISTORY: Right lower abdominal pain     COMPARISON: None.     PROCEDURE: . Axial images were obtained from the lung apex to the pubic  symphysis by computed tomography. This study was performed with  techniques to keep radiation doses as low as reasonably achievable,  (ALARA). Individualized dose reduction techniques using automated  exposure control or adjustment of mA and/or kV according to the patient  size were employed.     FINDINGS:      CHEST: There is no axillary adenopathy. There is there is a single  precarinal lymph node with short axis measuring 12 mm and measuring 22  mm long axis which is mildly prominent. Recommend 3 month  follow-up to  document stability or resolution. the heart size is enlarged. There is  trace pericardial and bilateral pleural effusions. There is bilateral  lower lobe associated airspace disease with component of atelectasis.  Pneumonia is in the differential. Calcified granuloma identified. Upper  lobes are clear.     ABDOMEN: The liver is homogenous with no focal abnormality. Gallbladder  contains at least 2 gallstones. Consider gallbladder ultrasound for  further evaluation. Vascular calcifications noted. The spleen is  unremarkable. No adrenal mass is present.  The pancreas is unremarkable.  The kidneys demonstrate mild, bilateral cortical thinning, otherwise  kidneys appear normal. There is bilateral perirenal stranding. The aorta  is normal in caliber. There is no free fluid or adenopathy. Significant,  subcutaneous edema identified throughout the abdomen and pelvis.     PELVIS: The GI tract demonstrates no obstruction. The appendix is is not  identified but no secondary signs of appendicitis seen. The urinary  bladder is mostly collapsed with no abnormality seen. Uterus is  diminutive or absent. There is a mild amount of free fluid in the  pelvis.       Impression:      Cardiomegaly, bilateral pleural effusions, significant  subcutaneous edema and minimal pelvic ascites; consider CHF and  anasarca..     Bilateral lower lobe airspace disease, possible pneumonia.     Cholelithiasis, consider gallbladder ultrasound.     This report was signed and finalized on 5/11/2023 4:27 PM by Deyanira العراقي MD.    CT Abdomen Pelvis Without Contrast [[id number]] Collected: 05/11/23 1617     Updated: 05/11/23 1629    Narrative:      PROCEDURE: CT CHEST ABDOMEN and PELVIS WO CONTRAST-     HISTORY: Right lower abdominal pain     COMPARISON: None.     PROCEDURE: . Axial images were obtained from the lung apex to the pubic  symphysis by computed tomography. This study was performed with  techniques to keep radiation doses as low as  reasonably achievable,  (ALARA). Individualized dose reduction techniques using automated  exposure control or adjustment of mA and/or kV according to the patient  size were employed.     FINDINGS:      CHEST: There is no axillary adenopathy. There is there is a single  precarinal lymph node with short axis measuring 12 mm and measuring 22  mm long axis which is mildly prominent. Recommend 3 month follow-up to  document stability or resolution. the heart size is enlarged. There is  trace pericardial and bilateral pleural effusions. There is bilateral  lower lobe associated airspace disease with component of atelectasis.  Pneumonia is in the differential. Calcified granuloma identified. Upper  lobes are clear.     ABDOMEN: The liver is homogenous with no focal abnormality. Gallbladder  contains at least 2 gallstones. Consider gallbladder ultrasound for  further evaluation. Vascular calcifications noted. The spleen is  unremarkable. No adrenal mass is present.  The pancreas is unremarkable.  The kidneys demonstrate mild, bilateral cortical thinning, otherwise  kidneys appear normal. There is bilateral perirenal stranding. The aorta  is normal in caliber. There is no free fluid or adenopathy. Significant,  subcutaneous edema identified throughout the abdomen and pelvis.     PELVIS: The GI tract demonstrates no obstruction. The appendix is is not  identified but no secondary signs of appendicitis seen. The urinary  bladder is mostly collapsed with no abnormality seen. Uterus is  diminutive or absent. There is a mild amount of free fluid in the  pelvis.       Impression:      Cardiomegaly, bilateral pleural effusions, significant  subcutaneous edema and minimal pelvic ascites; consider CHF and  anasarca..     Bilateral lower lobe airspace disease, possible pneumonia.     Cholelithiasis, consider gallbladder ultrasound.     This report was signed and finalized on 5/11/2023 4:27 PM by Deyanira العراقي MD.           Echo:    Results for orders placed during the hospital encounter of 05/11/23    Adult Transthoracic Echo Complete With Contrast if Necessary Per Protocol    Interpretation Summary  •  Left ventricular ejection fraction appears to be 66 - 70%.  •  Left ventricular wall thickness is consistent with moderate concentric hypertrophy.  •  Left ventricular diastolic function is consistent with (grade III w/high LAP) reversible restrictive pattern.  •  The right ventricular cavity is mild to moderately dilated.  •  The right atrial cavity is borderline dilated.  •  There is moderate calcification of the aortic valve mainly affecting the non-coronary, left coronary and right coronary cusp(s).  •  Estimated right ventricular systolic pressure from tricuspid regurgitation is markedly elevated (>55 mmHg).  •  Moderate to severe pulmonary hypertension is present.  •  There is a small (<1cm) pericardial effusion adjacent to the right atrium. There is no evidence of cardiac tamponade.    Condition on Discharge:      Stable.    Code status during the hospital stay:    Code Status and Medical Interventions:   Ordered at: 05/12/23 0101     Medical Intervention Limits:    NO intubation (DNI)     Code Status (Patient has no pulse and is not breathing):    No CPR (Do Not Attempt to Resuscitate)     Medical Interventions (Patient has pulse or is breathing):    Limited Support     Discharge Disposition:    Long Term Care (DC - External)    Discharge Medications:       Discharge Medications      New Medications      Instructions Start Date   amLODIPine 10 MG tablet  Commonly known as: NORVASC   10 mg, Oral, Every 24 Hours Scheduled   Start Date: May 17, 2023     carbamide peroxide 6.5 % otic solution  Commonly known as: DEBROX   5 drops, Both Ears, 2 Times Daily      furosemide 20 MG tablet  Commonly known as: Lasix   20 mg, Oral, 2 Times Daily         Changes to Medications      Instructions Start Date   insulin detemir 100 UNIT/ML  injection  Commonly known as: LEVEMIR  What changed: how much to take   20 Units, Subcutaneous, Nightly      polyethylene glycol 17 GM/SCOOP powder  Commonly known as: MIRALAX  What changed: Another medication with the same name was added. Make sure you understand how and when to take each.   17 g, Oral, 2 Times Daily      polyethylene glycol 17 g packet  Commonly known as: MIRALAX  What changed: You were already taking a medication with the same name, and this prescription was added. Make sure you understand how and when to take each.   17 g, Oral, Daily         Continue These Medications      Instructions Start Date   acetaminophen 325 MG tablet  Commonly known as: TYLENOL   650 mg, Oral, Every 4 Hours PRN      apixaban 5 MG tablet tablet  Commonly known as: ELIQUIS   5 mg, Oral, 2 Times Daily      Boudreauxs Butt Paste 16 % ointment  Generic drug: Zinc Oxide   Apply externally, Applied every shift       chlordiazePOXIDE 5 MG capsule  Commonly known as: LIBRIUM   5 mg, Oral, 2 Times Daily      cholecalciferol 25 MCG (1000 UT) tablet  Commonly known as: VITAMIN D3   1,000 Units, Oral, Daily      clopidogrel 75 MG tablet  Commonly known as: PLAVIX   75 mg, Oral, Daily      Cyanocobalamin 1000 MCG/ML kit   1 dose, Injection, Every 30 Days, On the 17th of each month      docusate sodium 100 MG capsule  Commonly known as: COLACE   100 mg, Oral, Nightly      fluticasone 50 MCG/ACT nasal spray  Commonly known as: FLONASE   1 spray, Nasal, Daily      HYDROcodone-acetaminophen 5-325 MG per tablet  Commonly known as: NORCO   1 tablet, Oral, Every 6 Hours PRN      Insulin Aspart 100 UNIT/ML injection  Commonly known as: novoLOG   Subcutaneous, 3 Times Daily Before Meals      Insulin Lispro (1 Unit Dial) 100 UNIT/ML solution pen-injector  Commonly known as: HUMALOG   Inject  under the skin into the appropriate area as directed. Sliding scale before meals and at hs      ipratropium-albuterol 0.5-2.5 mg/3 ml nebulizer  Commonly  known as: DUO-NEB   3 mL, Nebulization, Every 6 Hours PRN      isosorbide mononitrate 30 MG 24 hr tablet  Commonly known as: IMDUR   60 mg, Oral, Daily      levothyroxine 75 MCG tablet  Commonly known as: SYNTHROID, LEVOTHROID   75 mcg, Oral, Daily      linagliptin 5 MG tablet tablet  Commonly known as: TRADJENTA   5 mg, Daily      lisinopril 20 MG tablet  Commonly known as: PRINIVIL,ZESTRIL   20 mg, Oral, Daily      loratadine 10 MG tablet  Commonly known as: CLARITIN   10 mg, Oral, Daily      LORazepam 0.5 MG tablet  Commonly known as: ATIVAN   0.5 mg, Oral, Every 4 Hours PRN      magnesium oxide 400 MG tablet  Commonly known as: MAG-OX   400 mg, Oral, 2 Times Weekly, TUES AND FRID      Melatonin 10 MG tablet   10 mg, Oral, Nightly      metoprolol succinate XL 50 MG 24 hr tablet  Commonly known as: TOPROL-XL   50 mg, Oral, Daily      mirtazapine 15 MG disintegrating tablet  Commonly known as: REMERON SOL-TAB   15 mg, Translingual, Nightly      nitroglycerin 0.4 MG SL tablet  Commonly known as: NITROSTAT   0.4 mg, Sublingual, Every 5 Minutes PRN, Take no more than 3 doses in 15 minutes.      nystatin [id number] UNIT/GM ointment  Commonly known as: MYCOSTATIN   1 application, Topical, 3 Times Daily      pantoprazole 40 MG pack packet  Commonly known as: PROTONIX   40 mg, Oral, Every Morning Before Breakfast      potassium chloride 20 MEQ CR tablet  Commonly known as: K-DUR,KLOR-CON   20 mEq, Oral, Daily      sertraline 50 MG tablet  Commonly known as: ZOLOFT   50 mg, Oral, Daily      simethicone 80 MG chewable tablet  Commonly known as: MYLICON   80 mg, Oral, Every 6 Hours PRN, 2 TABS PO PRN      SITagliptin 100 MG tablet  Commonly known as: JANUVIA   100 mg, Oral, Daily      sucralfate 1 g tablet  Commonly known as: CARAFATE   1 g, Oral, 4 Times Daily           Discharge Diet:     Diet Instructions     Diet: Cardiac Diets, Diabetic Diets; Low Sodium (2g); Consistent Carbohydrate      Discharge Diet:  Cardiac  Diets  Diabetic Diets       Cardiac Diet: Low Sodium (2g)    Diabetic Diet: Consistent Carbohydrate    Texture: Regular Texture (IDDSI 7)    Fluid Consistency: Thin (IDDSI 0)        Activity at Discharge:     Activity Instructions     Measure Weight Daily      Instruct patient on daily weights        Follow-up Appointments:    Additional Instructions for the Follow-ups that You Need to Schedule     Discharge Follow-up with PCP   As directed       Currently Documented PCP:    Provider, No Known    PCP Phone Number:    None     Follow Up Details: 1 week    Follow Up: 1 Week         Discharge Follow-up with Specialty: Cardiology; 1 Month   As directed      Specialty: Cardiology    Follow Up: 1 Month            Follow-up Information     Provider, No Known .    Why: 1 week  Contact information:  [facility] [id number]                       Future Appointments   Date Time Provider Department Center   5/27/2023  8:00 AM Radha Hopper MD [facility]     Test Results Pending at Discharge:    Pending Labs     Order Current Status    Blood Culture - Blood, Hand, Left Preliminary result    Blood Culture - Blood, Hand, Right Preliminary result             Jarvis Hampton DO  05/16/23  08:57 EDT    Time: I spent 45 minutes on this discharge activity which included: face-to-face encounter with the patient, reviewing the data in the system, coordination of the care with the nursing staff as well as consultants, documentation, and entering orders.     Dictated utilizing Dragon dictation.

## 2023-05-16 NOTE — CASE MANAGEMENT/SOCIAL WORK
Case Management Discharge Note      Final Note: Pt. will be discharging back to [facility] for LTC. Pt. will need EMS transport.    Provided Post Acute Provider List?: N/A  Provided Post Acute Provider Quality & Resource List?: N/A    Selected Continued Care - Admitted Since 5/11/2023     Destination    No services have been selected for the patient.              Durable Medical Equipment    No services have been selected for the patient.              Dialysis/Infusion    No services have been selected for the patient.              Home Medical Care    No services have been selected for the patient.              Therapy    No services have been selected for the patient.              Community Resources    No services have been selected for the patient.              Community & DME    No services have been selected for the patient.                  Transportation Services  Ambulance: [facility]    Final Discharge Disposition Code: 04 - intermediate care facility

## 2023-05-16 NOTE — PLAN OF CARE
Goal Outcome Evaluation:  Plan of Care Reviewed With: patient        Progress: no change  Outcome Evaluation: Patient very restless overnight. Pain was rough but resolved with tylenol. Breathing improved after Lasix. Still expressing concern over discharge plan at this time. Continue to monitor.

## 2023-05-17 NOTE — PROGRESS NOTES
Enter Query Response Below      Query Response: hypertension  Electronically signed by Jarvis Hampton DO, 23, 7:15 PM EDT.               If applicable, please update the problem list.         Patient: Breonna Gongora        : 1935  Account: [id number]           Admit Date: 2023        How to Respond to this query:       a. Click New Note     b. Answer query within the yellow box.                c. Update the Problem List, if applicable.      If you have any questions about this query contact me at: [email or web address]    Dr. Hampton:        Patient with noted acute on chronic heart failure with preserved ejection fraction, hypertension, moderate calcification of aortic valve, paroxysmal atrial fibrillation.  Treatment included IV lasix, nitroglycerin drip, lisinopril increased, amlodipine restarted, strict I's and O's.     After study, can the etiology of the congestive heart failure be specified as:    >hypertension  >paroxysmal atrial fibrillation  >valvular disorder  >combination of hypertension, paroxysmal atrial fibrillation, and valvular disorder  >other, please specify____________  >unable to determine    By submitting this query, we are merely seeking further clarification of documentation to accurately reflect all conditions that you are monitoring, evaluating, treating or that extend the hospitalization or utilize additional resources of care. Please utilize your independent clinical judgment when addressing the question(s) above.     This query and your response, once completed, will be entered into the legal medical record.    Sincerely,  Devi Ozuna  Clinical Documentation Integrity Program

## 2023-05-23 DIAGNOSIS — F32.0 MAJOR DEPRESSIVE DISORDER, SINGLE EPISODE, MILD: ICD-10-CM

## 2023-05-23 RX ORDER — CHLORDIAZEPOXIDE HYDROCHLORIDE 5 MG/1
5 CAPSULE, GELATIN COATED ORAL 2 TIMES DAILY
Qty: 60 CAPSULE | Refills: 3 | Status: SHIPPED | OUTPATIENT
Start: 2023-05-23

## 2023-05-23 NOTE — TELEPHONE ENCOUNTER
(READMIT)    [facility] REQUESTING MED REFILL FOR CHLORDIAZEPOXIDE HCL 5 MG.    DIRECTIONS:CHLORDIAZEPOXIDE HCL 5 MG 1 CAP PO BID.

## 2023-05-26 ENCOUNTER — OFFICE VISIT (OUTPATIENT)
Dept: CARDIOLOGY | Facility: HOSPITAL | Age: 88
End: 2023-05-26
Payer: MEDICARE

## 2023-05-26 VITALS — SYSTOLIC BLOOD PRESSURE: 137 MMHG | DIASTOLIC BLOOD PRESSURE: 81 MMHG | HEART RATE: 72 BPM

## 2023-05-26 DIAGNOSIS — I50.33 ACUTE ON CHRONIC HEART FAILURE WITH PRESERVED EJECTION FRACTION (HFPEF): Primary | ICD-10-CM

## 2023-05-26 DIAGNOSIS — I10 ESSENTIAL (PRIMARY) HYPERTENSION: ICD-10-CM

## 2023-05-26 DIAGNOSIS — I48.0 PAROXYSMAL ATRIAL FIBRILLATION: ICD-10-CM

## 2023-05-26 NOTE — PROGRESS NOTES
Mode of visit: Telephone  Location of patient: Home   You have chosen to receive care through the use of telemedicine. Telemedicine enables health care providers at different locations to provide safe, effective, and convenient care through the use of technology. As with any health care service, there are risks associated with the use of telemedicine, including equipment failure, poor connections, and  issues.  • Do you understand the risks and benefits of telemedicine as I have explained them to you? Yes  • Have your questions regarding telemedicine been answered? Yes  • Do you consent to the use of telemedicine in your medical care today? Yes      Chief Complaint  Hospital Follow Up Visit    [facility]  Telemedicine note    This was a telephone enabled telemedicine encounter.    Subjective    History of Present Illness {CC  Problem List  Visit  Diagnosis   Encounters  Notes  Medications  Labs  Result Review Imaging  Media :23}     Breonna Gongora, 87 y.o. female with HFpEF, HTN, T2DM presents to [facility] telemedicine visit for Hospital Follow Up Visit.    Patient recently hospitalized at [facility] for hypervolemia, diuresed during recent hospitalization. TTE with LVEF 66-70% during hospitalization. Discharged to rehab facility after hospitalization.     Presents today with assistance from nursing staff. Overall volume status is stable. No increased shortness of breath or edema reported by nursing staff. Patient very hard of hearing, so visit completed with assistance from facility nurse. Plan for facility MD to see patient today. Blood pressure and heart rate controlled on vitals this morning, medication list reviewed.       Objective     Vital Signs:   Vitals:    05/26/23 0934   BP: 137/81   Pulse: 72     There is no height or weight on file to calculate BMI.      Data Reviewed:{ Labs  Result Review  Imaging  Med Tab  Media  :23}     Magnesium (05/15/2023 06:08)  Basic Metabolic Panel (05/15/2023 06:08)  CBC (No Diff) (05/15/2023 06:08)  Adult Transthoracic Echo Complete With Contrast if Necessary Per Protocol (05/12/2023 09:30)      Assessment and Plan {CC Problem List  Visit Diagnosis  ROS  Review (Popup)  Health Maintenance  Quality  BestPractice  Medications  SmartSets  SnapShot Encounters  Media :23}     This visit has been scheduled as a telephone visit to comply with patient safety concerns in accordance with CDC recommendations. Time of discussion: 12 minutes.    1. Acute on chronic heart failure with preserved ejection fraction (HFpEF)  -Recent hospitalization with hypervolemia, TTE with preserved LVEF  -Volume status stable since discharge, no increased dyspnea/edema reported by nursing staff  -Continue furosemide as prescribed  -Continue afterload control  -Discussed contacting [facility] if assistance is needed with diuretic management, if further needs arise    2. Paroxysmal atrial fibrillation  -Rate controlled, denies tachypalpitation  -[id number]  -Continue apixaban, metoprolol as prescribed.     3. Essential (primary) hypertension  -Reasonably controlled  -Continue afterload control with imdur, metoprolol, lisinopril, amlodipine as prescribed.       Follow Up {Instructions Charge Capture  Follow-up Communications :23}   Return if symptoms worsen or fail to improve.      Patient was given instructions and counseling regarding her condition or for health maintenance advice. Please see specific information pulled into the AVS if appropriate.  Patient was instructed to call the [facility] Center with any questions, concerns, or worsening symptoms.    *Please note that portions of this note were completed with a voice recognition program. Efforts were made to edit the dictations, but occasionally words are mistranscribed.

## 2023-06-08 RX ORDER — HYDROCODONE BITARTRATE AND ACETAMINOPHEN 5; 325 MG/1; MG/1
1 TABLET ORAL EVERY 6 HOURS PRN
Qty: 120 TABLET | Refills: 0 | Status: SHIPPED | OUTPATIENT
Start: 2023-06-08

## 2023-06-08 RX ORDER — LORAZEPAM 0.5 MG/1
0.5 TABLET ORAL EVERY 4 HOURS PRN
Qty: 180 TABLET | Refills: 3 | Status: SHIPPED | OUTPATIENT
Start: 2023-06-08

## 2023-06-08 NOTE — TELEPHONE ENCOUNTER
[facility] REQUESTING MED REFILL FOR NORCO 5-325 MG AND LORAZEPAM 0.5 MG.    DIRECTIONS: NORCO 5-325 MG 1 TAB PO Q 6 HRS PRN.    LORAZEPAM 0.5 MG 1 TAB PO Q 4 HRS PRN.

## 2023-06-13 NOTE — PROGRESS NOTES
NURSING HOME PROGRESS NOTE       Radha Hopper MD  [address] [place] [address]  Phone: [phone]  Fax: [phone]       PATIENT NAME: Breonna Gongora                                                                          YOB: 1935           DATE OF SERVICE: 2/16/2023  FACILITY:  [facility]  ______________________________________________________________________    CHIEF COMPLAINT:  Follow-up visit, history of acute MI    SUBJECTIVE     HISTORY OF PRESENT ILLNESS:   Mrs. Gongora is an 87 years old female with history of type 2 diabetes mellitus, essential hypertension, congestive heart failure, atrial fibrillation and hypothyroidism. She was recently hospitalized at our [facility] with a diagnosis of pneumonia and non-STEMI.  Cardiac catheterization revealed multivessel disease with severe plaquing, as further management options presented in the form of multivessel PCI versus shifting focus to comfort measures via hospice services, family elected to proceed with the latter.  Per available records, patient was subsequently transferred to swing bed placement where she received PT and OT while she enjoyed a stable clinical course.  However, she apparently was unable to participate in recommended therapy, therefore she was subsequently transferred to our facility for consideration of long-term placement.      As reported by staff and regular medical oversight, since transfer, patient remained in stable condition with no reported fever, chills or other acute systemic issues; weight has been stable; patient continues to be on Zoloft, lorazepam and Librium for depression and anxiety with no adverse effects, psych services are following.      During my visit, patient was calm, comfortable, also hard of hearing, she communicated to me [feeling miserable] due to dysuria.  Above was communicated to staff with orders for UA and culture and sensitivity, family  will be informed regarding the same.    PAST MEDICAL & SURGICAL HISTORY:   Past Medical History:   Diagnosis Date    Allergic     Anemia     CHF (congestive heart failure)     Depression     Hypothyroidism     Paroxysmal A-fib     Stroke       Past Surgical History:   Procedure Laterality Date    APPENDECTOMY      CATARACT EXTRACTION N/A     TOTAL ABDOMINAL HYSTERECTOMY WITH SALPINGO OOPHORECTOMY           MEDICATIONS:  I have reviewed and reconciled the patients medication list in the patients chart at the skilled nursing facility today.      ALLERGIES:  Allergies   Allergen Reactions    Penicillins Unknown - High Severity    Sulfa Antibiotics Unknown - High Severity         SOCIAL HISTORY:  Social History     Socioeconomic History    Marital status: Unknown   Tobacco Use    Smoking status: Never    Smokeless tobacco: Never   Vaping Use    Vaping Use: Never used   Substance and Sexual Activity    Alcohol use: Never    Drug use: Never    Sexual activity: Defer       FAMILY HISTORY:  Family History   Problem Relation Age of Onset    COPD Mother     Heart disease Father     Alcohol abuse Father        REVIEW OF SYSTEMS:  Fatigue, limited mobility, deconditioning  All other systems were reviewed and are negative.    OBJECTIVE     PHYSICAL EXAMINATION:   BP (!) 187/83   Pulse 68   Temp 97.9 °F (36.6 °C)   Resp 16   SpO2 97%     Physical Exam    General Appearance:  Patient appears chronically ill, no signs of distress noted.   Lungs:   Breath sounds are equal bilaterally. No crackles or wheezing noted.   Heart:  Normal S1 and S2, no murmur, no gallop, no rub. No JVD.   Abdomen:   Soft, nontender, non distended, no guarding or rebound tenderness.  Bowel sounds are normal, no organomegaly.     Extremities: No edema, cyanosis or clubbing.     Musculoskeletal: Deconditioning noted.     Skin: No wounds or rash noted.     Neurologic:  Psychiatric:                           Grossly non focal.   Calm affect.       RECORDS  REVIEW:   I have reviewed available medical records.  January 16, 2023: Glucose 203, sodium 136, potassium 5.0, BUN/creatinine 13/0.7, LFTs within normal, WBCs 8.7, H&H 11.2/35.2, platelets 247  December 20, 2022: Glucose 195, sodium 137, potassium 4.4, BUN/creatinine 13/0.7, LFTs within normal, hemoglobin A1c 7.4, WBCs 9.9, H&H 10.2/31, platelets 213, TSH 1.94    ASSESSMENT AND PLAN     ASSESSMENT AND PLAN:    1. Non-STEMI (non-ST elevated myocardial infarction) (HCC) (Primary)  Comments:  Stable at present with no signs of ischemia, continue secondary prevention, Plavix, isosorbide and nitroglycerin as needed     2. Type 2 diabetes mellitus with hyperglycemia, unspecified whether long term insulin use (HCC)  Comments:  Blood glucose controlled at present, continue Tradjenta and correction dose coverage     3. Longstanding persistent atrial fibrillation (HCC)  Comments:  Clinically, rate is controlled at present on metoprolol, continue anticoagulation in the form of Eliquis     4. Essential (primary) hypertension  Comments:  Controlled on metoprolol and lisinopril, continue the same and monitor closely     5. Systolic congestive heart failure, unspecified HF chronicity ([facility])  Comments:  Stable at present, no signs of decompensation, of note records are unavailable in this regard     6. Anxiety and depression  Comments:  Continue mirtazapine and lorazepam, recommend psych services follow-up     7. Hypothyroidism, unspecified type  Comments:  Continue Synthroid, TSH is at therapeutic level, monitor and modify accordingly       GENERAL MANAGEMENT:    [x]  Plan of Care Reviewed   [x]  Aspiration and fall precautions  [x]  Nutritional support and hydration  [x]  Bowel hygiene  [x]  Skin and wound care, strict offloading and pressure relief  [x]  PT/OT Reviewed   [x]  Blood glucose monitoring, hypoglycemic precautions, correction dose coverage as ordered if applicable  [x]  GDR if/as clinically indicated  [x]  Consultant  follow up as clinically indicated  [x]  Code Status: DO NOT RESUSCITATE, DO NOT INTUBATE  [x]  Discussed in detail with nursing/staff, addressed all needs today              Radha Hopper MD.

## 2023-07-10 ENCOUNTER — NURSING HOME (OUTPATIENT)
Dept: FAMILY MEDICINE CLINIC | Facility: CLINIC | Age: 88
End: 2023-07-10
Payer: MEDICARE

## 2023-07-10 DIAGNOSIS — J06.9 ACUTE URI: Primary | ICD-10-CM

## 2023-07-10 DIAGNOSIS — R05.8 PRODUCTIVE COUGH: ICD-10-CM

## 2023-07-10 NOTE — LETTER
"Nursing Home Follow Up Note      Bean Ag DO []   RAINA Koehler []    RAINA Blake [x]   [address] [place] [address]  Phone: [phone]  Fax: [phone] Radha Hopper MD []    Justino Weston DO []   [address] [place] [address]  Phone: [phone]  Fax: [phone]     PATIENT NAME: Breonna Gongora                                                                          YOB: 1935           DATE OF SERVICE: 07/10/2023  FACILITY:  [x][facility]   [] [place]   [] [facility]   [] [facility]   [] Other ______________________________________________________________________      CHIEF COMPLAINT:  Productive cough, hoarseness      HISTORY OF PRESENT ILLNESS:   Breonna Gongora is a 88 y.o. female who is being seen today at the request of the nursing staff for productive cough and hoarseness. Patient reports onset \"several days\" ago. Reports production of thick, green sputum and mucous as well as associated headache and congestion. Denies fever, dizziness, N/V/D. At the time of visit today, patient is sitting in chair in her room.     PAST MEDICAL & SURGICAL HISTORY:   Past Medical History:   Diagnosis Date    Allergic     Anemia     CHF (congestive heart failure)     Depression     Hypothyroidism     Paroxysmal A-fib     Stroke       Past Surgical History:   Procedure Laterality Date    APPENDECTOMY      CATARACT EXTRACTION N/A     TOTAL ABDOMINAL HYSTERECTOMY WITH SALPINGO OOPHORECTOMY           MEDICATIONS:  I have reviewed and reconciled the patients medication list in the patients chart at the skilled nursing facility today.      ALLERGIES:    Allergies   Allergen Reactions    Penicillins Unknown - High Severity    Sulfa Antibiotics Unknown - High Severity         SOCIAL HISTORY:    Social History     Socioeconomic History    Marital status: Unknown   Tobacco Use    Smoking status: Never    Smokeless tobacco: Never   Vaping Use    Vaping Use: Never used "   Substance and Sexual Activity    Alcohol use: Never    Drug use: Never    Sexual activity: Defer       FAMILY HISTORY:    Family History   Problem Relation Age of Onset    COPD Mother     Heart disease Father     Alcohol abuse Father        REVIEW OF SYSTEMS:    Review of Systems   Constitutional:  Negative for fatigue.   HENT:  Positive for congestion, hearing loss, sinus pressure and voice change. Negative for ear discharge, ear pain and sore throat.    Respiratory:  Positive for cough. Negative for shortness of breath.    Cardiovascular:  Negative for chest pain, palpitations and leg swelling.   Gastrointestinal:  Negative for diarrhea, nausea and vomiting.   Skin:  Positive for skin lesions (round, nodule right forehead).   Neurological:  Negative for headache and confusion.   Psychiatric/Behavioral:  Positive for sleep disturbance.    ROS supplemented by nursing staff.      PHYSICAL EXAMINATION:   VITAL SIGNS:   Vitals:    07/10/23 0800   BP: 142/52   Pulse: 58   Resp: 18   Temp: 97 °F (36.1 °C)   SpO2: 97%         Nursing notes and vital signs reviewed.   General Appearance:  Elderly female, sitting in wheelchair, NAD.    Ears: External ears normal.   Head: Normal, atraumatic.   Eyes: PERRLA.  Conjunctivae and sclerae normal.    Neck: Normal range of motion. Neck supple. No JVD present.   Cardiovascular: Normal rate, regular rhythm.  Pulses palpable equal bilaterally.    Pulmonary/Chest: Effort normal and breath sounds normal. No respiratory distress.   Abdominal: Soft. Bowel sounds are normal. No distention or tenderness.     Musculoskeletal: Without obvious deformity.    Neurological: Awake.  Appears at baseline cognition.    Skin: Skin is warm and dry.    Psychiatric:  Normal mood and affect. Normal behavior        RECORDS REVIEW:    progress notes, VS    ASSESSMENT   Diagnoses and all orders for this visit:    1. Acute URI (Primary)    2. Productive cough          PLAN  --azithromycin 500mg by mouth once  daily on day 1, then 250mg by mouth once daily days 2-5  --Mucinex-DM 1200mg by mouth twice daily x 7 days  --VS per facility policy  --assist patient with mobility and ADLs as needed to promote safety and independence  --notify MD or myself of any acute changes in condition    [x]  Discussed Patient in detail with nursing/staff, addressed all needs today.     [x]  Plan of Care Reviewed   []  PT/OT Reviewed   []  Order Changes  []  Discharge Plans Reviewed  [x]  Advance Directive on file with Nursing Home.   [x]  POA on file with Nursing Home.   [x]  Code Status listed: []  Full Code   []  DNR         Arianne Mcintosh, RAINA  07/10/2023

## 2023-07-11 ENCOUNTER — APPOINTMENT (OUTPATIENT)
Dept: GENERAL RADIOLOGY | Facility: HOSPITAL | Age: 88
DRG: 291 | End: 2023-07-11
Payer: MEDICARE

## 2023-07-11 ENCOUNTER — HOSPITAL ENCOUNTER (INPATIENT)
Facility: HOSPITAL | Age: 88
LOS: 2 days | Discharge: NURSING, REHAB OR LONG-TERM CARE | DRG: 291 | End: 2023-07-15
Attending: EMERGENCY MEDICINE
Payer: MEDICARE

## 2023-07-11 DIAGNOSIS — I50.9 ACUTE ON CHRONIC CONGESTIVE HEART FAILURE, UNSPECIFIED HEART FAILURE TYPE: Primary | ICD-10-CM

## 2023-07-11 DIAGNOSIS — G47.00 INSOMNIA, UNSPECIFIED TYPE: ICD-10-CM

## 2023-07-11 DIAGNOSIS — J44.1 COPD EXACERBATION: ICD-10-CM

## 2023-07-11 LAB
ALBUMIN SERPL-MCNC: 3.4 G/DL (ref 3.5–5.2)
ALBUMIN/GLOB SERPL: 0.9 G/DL
ALP SERPL-CCNC: 81 U/L (ref 39–117)
ALT SERPL W P-5'-P-CCNC: 6 U/L (ref 1–33)
ANION GAP SERPL CALCULATED.3IONS-SCNC: 11.3 MMOL/L (ref 5–15)
AST SERPL-CCNC: 12 U/L (ref 1–32)
BASOPHILS # BLD AUTO: 0.05 10*3/MM3 (ref 0–0.2)
BASOPHILS NFR BLD AUTO: 0.3 % (ref 0–1.5)
BILIRUB SERPL-MCNC: 0.3 MG/DL (ref 0–1.2)
BUN SERPL-MCNC: 20 MG/DL (ref 8–23)
BUN/CREAT SERPL: 24.4 (ref 7–25)
CALCIUM SPEC-SCNC: 9 MG/DL (ref 8.6–10.5)
CHLORIDE SERPL-SCNC: 92 MMOL/L (ref 98–107)
CO2 SERPL-SCNC: 26.7 MMOL/L (ref 22–29)
CREAT SERPL-MCNC: 0.82 MG/DL (ref 0.57–1)
D-LACTATE SERPL-SCNC: 1.5 MMOL/L (ref 0.5–2)
DEPRECATED RDW RBC AUTO: 45.8 FL (ref 37–54)
EGFRCR SERPLBLD CKD-EPI 2021: 69.3 ML/MIN/1.73
EOSINOPHIL # BLD AUTO: 0.01 10*3/MM3 (ref 0–0.4)
EOSINOPHIL NFR BLD AUTO: 0.1 % (ref 0.3–6.2)
ERYTHROCYTE [DISTWIDTH] IN BLOOD BY AUTOMATED COUNT: 14.2 % (ref 12.3–15.4)
FLUAV RNA RESP QL NAA+PROBE: NOT DETECTED
FLUBV RNA RESP QL NAA+PROBE: NOT DETECTED
GLOBULIN UR ELPH-MCNC: 3.8 GM/DL
GLUCOSE SERPL-MCNC: 248 MG/DL (ref 65–99)
HCT VFR BLD AUTO: 35.6 % (ref 34–46.6)
HGB BLD-MCNC: 11.3 G/DL (ref 12–15.9)
HOLD SPECIMEN: NORMAL
HOLD SPECIMEN: NORMAL
IMM GRANULOCYTES # BLD AUTO: 0.08 10*3/MM3 (ref 0–0.05)
IMM GRANULOCYTES NFR BLD AUTO: 0.5 % (ref 0–0.5)
LYMPHOCYTES # BLD AUTO: 0.89 10*3/MM3 (ref 0.7–3.1)
LYMPHOCYTES NFR BLD AUTO: 6 % (ref 19.6–45.3)
MCH RBC QN AUTO: 28.1 PG (ref 26.6–33)
MCHC RBC AUTO-ENTMCNC: 31.7 G/DL (ref 31.5–35.7)
MCV RBC AUTO: 88.6 FL (ref 79–97)
MONOCYTES # BLD AUTO: 0.99 10*3/MM3 (ref 0.1–0.9)
MONOCYTES NFR BLD AUTO: 6.6 % (ref 5–12)
NEUTROPHILS NFR BLD AUTO: 12.9 10*3/MM3 (ref 1.7–7)
NEUTROPHILS NFR BLD AUTO: 86.5 % (ref 42.7–76)
NRBC BLD AUTO-RTO: 0 /100 WBC (ref 0–0.2)
NT-PROBNP SERPL-MCNC: ABNORMAL PG/ML (ref 0–1800)
PLATELET # BLD AUTO: 245 10*3/MM3 (ref 140–450)
PMV BLD AUTO: 12 FL (ref 6–12)
POTASSIUM SERPL-SCNC: 4.8 MMOL/L (ref 3.5–5.2)
PROT SERPL-MCNC: 7.2 G/DL (ref 6–8.5)
RBC # BLD AUTO: 4.02 10*6/MM3 (ref 3.77–5.28)
SARS-COV-2 RNA RESP QL NAA+PROBE: NOT DETECTED
SODIUM SERPL-SCNC: 130 MMOL/L (ref 136–145)
WBC NRBC COR # BLD: 14.92 10*3/MM3 (ref 3.4–10.8)
WHOLE BLOOD HOLD COAG: NORMAL
WHOLE BLOOD HOLD SPECIMEN: NORMAL

## 2023-07-11 PROCEDURE — 93005 ELECTROCARDIOGRAM TRACING: CPT

## 2023-07-11 PROCEDURE — 99285 EMERGENCY DEPT VISIT HI MDM: CPT

## 2023-07-11 PROCEDURE — 80053 COMPREHEN METABOLIC PANEL: CPT

## 2023-07-11 PROCEDURE — 87636 SARSCOV2 & INF A&B AMP PRB: CPT | Performed by: EMERGENCY MEDICINE

## 2023-07-11 PROCEDURE — 83880 ASSAY OF NATRIURETIC PEPTIDE: CPT

## 2023-07-11 PROCEDURE — 85025 COMPLETE CBC W/AUTO DIFF WBC: CPT

## 2023-07-11 PROCEDURE — 71045 X-RAY EXAM CHEST 1 VIEW: CPT

## 2023-07-11 PROCEDURE — 93005 ELECTROCARDIOGRAM TRACING: CPT | Performed by: EMERGENCY MEDICINE

## 2023-07-11 PROCEDURE — 83605 ASSAY OF LACTIC ACID: CPT

## 2023-07-11 PROCEDURE — 84145 PROCALCITONIN (PCT): CPT | Performed by: EMERGENCY MEDICINE

## 2023-07-11 RX ORDER — SODIUM CHLORIDE 0.9 % (FLUSH) 0.9 %
10 SYRINGE (ML) INJECTION AS NEEDED
Status: DISCONTINUED | OUTPATIENT
Start: 2023-07-11 | End: 2023-07-15 | Stop reason: HOSPADM

## 2023-07-12 PROBLEM — J96.21 ACUTE ON CHRONIC RESPIRATORY FAILURE WITH HYPOXIA: Status: ACTIVE | Noted: 2023-07-12

## 2023-07-12 PROBLEM — J44.1 ACUTE EXACERBATION OF CHRONIC OBSTRUCTIVE PULMONARY DISEASE (COPD): Status: ACTIVE | Noted: 2023-07-12

## 2023-07-12 PROBLEM — I48.20 CHRONIC ATRIAL FIBRILLATION: Status: ACTIVE | Noted: 2022-12-19

## 2023-07-12 PROBLEM — I27.20 PULMONARY HYPERTENSION: Status: ACTIVE | Noted: 2023-07-12

## 2023-07-12 PROBLEM — I50.9 ACUTE EXACERBATION OF CHF (CONGESTIVE HEART FAILURE): Status: ACTIVE | Noted: 2023-07-12

## 2023-07-12 LAB
A-A DO2: 16.5 MMHG
ANION GAP SERPL CALCULATED.3IONS-SCNC: 12.5 MMOL/L (ref 5–15)
ARTERIAL PATENCY WRIST A: POSITIVE
ATMOSPHERIC PRESS: 734 MMHG
B PARAPERT DNA SPEC QL NAA+PROBE: NOT DETECTED
B PERT DNA SPEC QL NAA+PROBE: NOT DETECTED
BASE EXCESS BLDA CALC-SCNC: 5.8 MMOL/L (ref 0–2)
BASOPHILS # BLD AUTO: 0.05 10*3/MM3 (ref 0–0.2)
BASOPHILS NFR BLD AUTO: 0.3 % (ref 0–1.5)
BDY SITE: ABNORMAL
BUN SERPL-MCNC: 22 MG/DL (ref 8–23)
BUN/CREAT SERPL: 25.6 (ref 7–25)
C PNEUM DNA NPH QL NAA+NON-PROBE: NOT DETECTED
CALCIUM SPEC-SCNC: 9.3 MG/DL (ref 8.6–10.5)
CHLORIDE SERPL-SCNC: 92 MMOL/L (ref 98–107)
CO2 SERPL-SCNC: 27.5 MMOL/L (ref 22–29)
COHGB MFR BLD: 1.6 % (ref 0–2)
CREAT SERPL-MCNC: 0.86 MG/DL (ref 0.57–1)
DEPRECATED RDW RBC AUTO: 46.2 FL (ref 37–54)
EGFRCR SERPLBLD CKD-EPI 2021: 65.5 ML/MIN/1.73
EOSINOPHIL # BLD AUTO: 0.02 10*3/MM3 (ref 0–0.4)
EOSINOPHIL NFR BLD AUTO: 0.1 % (ref 0.3–6.2)
ERYTHROCYTE [DISTWIDTH] IN BLOOD BY AUTOMATED COUNT: 14.1 % (ref 12.3–15.4)
FLUAV SUBTYP SPEC NAA+PROBE: NOT DETECTED
FLUBV RNA ISLT QL NAA+PROBE: NOT DETECTED
GAS FLOW AIRWAY: 2 LPM
GEN 5 2HR TROPONIN T REFLEX: 23 NG/L
GLUCOSE BLDC GLUCOMTR-MCNC: 258 MG/DL (ref 70–130)
GLUCOSE BLDC GLUCOMTR-MCNC: 333 MG/DL (ref 70–130)
GLUCOSE BLDC GLUCOMTR-MCNC: 434 MG/DL (ref 70–130)
GLUCOSE SERPL-MCNC: 261 MG/DL (ref 65–99)
HADV DNA SPEC NAA+PROBE: NOT DETECTED
HBA1C MFR BLD: 8.9 % (ref 4.8–5.6)
HCO3 BLDA-SCNC: 31.1 MMOL/L (ref 22–28)
HCOV 229E RNA SPEC QL NAA+PROBE: NOT DETECTED
HCOV HKU1 RNA SPEC QL NAA+PROBE: NOT DETECTED
HCOV NL63 RNA SPEC QL NAA+PROBE: NOT DETECTED
HCOV OC43 RNA SPEC QL NAA+PROBE: NOT DETECTED
HCT VFR BLD AUTO: 36.9 % (ref 34–46.6)
HCT VFR BLD CALC: 37.5 %
HGB BLD-MCNC: 11.7 G/DL (ref 12–15.9)
HMPV RNA NPH QL NAA+NON-PROBE: NOT DETECTED
HPIV1 RNA ISLT QL NAA+PROBE: NOT DETECTED
HPIV2 RNA SPEC QL NAA+PROBE: NOT DETECTED
HPIV3 RNA NPH QL NAA+PROBE: DETECTED
HPIV4 P GENE NPH QL NAA+PROBE: NOT DETECTED
IMM GRANULOCYTES # BLD AUTO: 0.09 10*3/MM3 (ref 0–0.05)
IMM GRANULOCYTES NFR BLD AUTO: 0.6 % (ref 0–0.5)
LYMPHOCYTES # BLD AUTO: 0.72 10*3/MM3 (ref 0.7–3.1)
LYMPHOCYTES NFR BLD AUTO: 4.8 % (ref 19.6–45.3)
Lab: ABNORMAL
M PNEUMO IGG SER IA-ACNC: NOT DETECTED
MCH RBC QN AUTO: 28.5 PG (ref 26.6–33)
MCHC RBC AUTO-ENTMCNC: 31.7 G/DL (ref 31.5–35.7)
MCV RBC AUTO: 89.8 FL (ref 79–97)
METHGB BLD QL: 0.4 % (ref 0–1.5)
MODALITY: ABNORMAL
MONOCYTES # BLD AUTO: 1.05 10*3/MM3 (ref 0.1–0.9)
MONOCYTES NFR BLD AUTO: 6.9 % (ref 5–12)
MRSA DNA SPEC QL NAA+PROBE: NORMAL
NEUTROPHILS NFR BLD AUTO: 13.18 10*3/MM3 (ref 1.7–7)
NEUTROPHILS NFR BLD AUTO: 87.3 % (ref 42.7–76)
NOTE: ABNORMAL
NRBC BLD AUTO-RTO: 0 /100 WBC (ref 0–0.2)
OXYHGB MFR BLDV: 94.8 % (ref 94–99)
PCO2 BLDA: 46.9 MM HG (ref 35–45)
PCO2 TEMP ADJ BLD: ABNORMAL MM[HG]
PH BLDA: 7.43 PH UNITS (ref 7.35–7.45)
PH, TEMP CORRECTED: ABNORMAL
PLATELET # BLD AUTO: 231 10*3/MM3 (ref 140–450)
PMV BLD AUTO: 12.3 FL (ref 6–12)
PO2 BLDA: 74.9 MM HG (ref 75–100)
PO2 TEMP ADJ BLD: ABNORMAL MM[HG]
POTASSIUM SERPL-SCNC: 4.1 MMOL/L (ref 3.5–5.2)
PROCALCITONIN SERPL-MCNC: 0.1 NG/ML (ref 0–0.25)
PROCALCITONIN SERPL-MCNC: 0.11 NG/ML (ref 0–0.25)
RBC # BLD AUTO: 4.11 10*6/MM3 (ref 3.77–5.28)
RHINOVIRUS RNA SPEC NAA+PROBE: NOT DETECTED
RSV RNA NPH QL NAA+NON-PROBE: NOT DETECTED
SAO2 % BLDCOA: 96.8 % (ref 94–100)
SARS-COV-2 RNA NPH QL NAA+NON-PROBE: NOT DETECTED
SODIUM SERPL-SCNC: 132 MMOL/L (ref 136–145)
TROPONIN T DELTA: 0 NG/L
TROPONIN T SERPL HS-MCNC: 23 NG/L
VENTILATOR MODE: ABNORMAL
WBC NRBC COR # BLD: 15.11 10*3/MM3 (ref 3.4–10.8)

## 2023-07-12 PROCEDURE — 94664 DEMO&/EVAL PT USE INHALER: CPT

## 2023-07-12 PROCEDURE — 82948 REAGENT STRIP/BLOOD GLUCOSE: CPT

## 2023-07-12 PROCEDURE — 5A09357 ASSISTANCE WITH RESPIRATORY VENTILATION, LESS THAN 24 CONSECUTIVE HOURS, CONTINUOUS POSITIVE AIRWAY PRESSURE: ICD-10-PCS | Performed by: INTERNAL MEDICINE

## 2023-07-12 PROCEDURE — 0202U NFCT DS 22 TRGT SARS-COV-2: CPT | Performed by: STUDENT IN AN ORGANIZED HEALTH CARE EDUCATION/TRAINING PROGRAM

## 2023-07-12 PROCEDURE — 82375 ASSAY CARBOXYHB QUANT: CPT

## 2023-07-12 PROCEDURE — 94660 CPAP INITIATION&MGMT: CPT

## 2023-07-12 PROCEDURE — 63710000001 INSULIN ASPART PER 5 UNITS: Performed by: INTERNAL MEDICINE

## 2023-07-12 PROCEDURE — 84145 PROCALCITONIN (PCT): CPT | Performed by: STUDENT IN AN ORGANIZED HEALTH CARE EDUCATION/TRAINING PROGRAM

## 2023-07-12 PROCEDURE — 83050 HGB METHEMOGLOBIN QUAN: CPT

## 2023-07-12 PROCEDURE — 83036 HEMOGLOBIN GLYCOSYLATED A1C: CPT | Performed by: INTERNAL MEDICINE

## 2023-07-12 PROCEDURE — 87081 CULTURE SCREEN ONLY: CPT | Performed by: STUDENT IN AN ORGANIZED HEALTH CARE EDUCATION/TRAINING PROGRAM

## 2023-07-12 PROCEDURE — 25010000002 ONDANSETRON PER 1 MG: Performed by: STUDENT IN AN ORGANIZED HEALTH CARE EDUCATION/TRAINING PROGRAM

## 2023-07-12 PROCEDURE — 25010000002 FUROSEMIDE PER 20 MG: Performed by: EMERGENCY MEDICINE

## 2023-07-12 PROCEDURE — 87081 CULTURE SCREEN ONLY: CPT

## 2023-07-12 PROCEDURE — 85025 COMPLETE CBC W/AUTO DIFF WBC: CPT | Performed by: STUDENT IN AN ORGANIZED HEALTH CARE EDUCATION/TRAINING PROGRAM

## 2023-07-12 PROCEDURE — G0378 HOSPITAL OBSERVATION PER HR: HCPCS

## 2023-07-12 PROCEDURE — 25010000002 METHYLPREDNISOLONE PER 40 MG: Performed by: STUDENT IN AN ORGANIZED HEALTH CARE EDUCATION/TRAINING PROGRAM

## 2023-07-12 PROCEDURE — 82805 BLOOD GASES W/O2 SATURATION: CPT

## 2023-07-12 PROCEDURE — 87102 FUNGUS ISOLATION CULTURE: CPT | Performed by: STUDENT IN AN ORGANIZED HEALTH CARE EDUCATION/TRAINING PROGRAM

## 2023-07-12 PROCEDURE — 25010000002 LORAZEPAM PER 2 MG: Performed by: STUDENT IN AN ORGANIZED HEALTH CARE EDUCATION/TRAINING PROGRAM

## 2023-07-12 PROCEDURE — 94640 AIRWAY INHALATION TREATMENT: CPT

## 2023-07-12 PROCEDURE — 94799 UNLISTED PULMONARY SVC/PX: CPT

## 2023-07-12 PROCEDURE — 25010000002 METHYLPREDNISOLONE PER 125 MG: Performed by: STUDENT IN AN ORGANIZED HEALTH CARE EDUCATION/TRAINING PROGRAM

## 2023-07-12 PROCEDURE — 94761 N-INVAS EAR/PLS OXIMETRY MLT: CPT

## 2023-07-12 PROCEDURE — 99223 1ST HOSP IP/OBS HIGH 75: CPT | Performed by: STUDENT IN AN ORGANIZED HEALTH CARE EDUCATION/TRAINING PROGRAM

## 2023-07-12 PROCEDURE — 87641 MR-STAPH DNA AMP PROBE: CPT | Performed by: STUDENT IN AN ORGANIZED HEALTH CARE EDUCATION/TRAINING PROGRAM

## 2023-07-12 PROCEDURE — 84484 ASSAY OF TROPONIN QUANT: CPT | Performed by: STUDENT IN AN ORGANIZED HEALTH CARE EDUCATION/TRAINING PROGRAM

## 2023-07-12 PROCEDURE — 80048 BASIC METABOLIC PNL TOTAL CA: CPT | Performed by: STUDENT IN AN ORGANIZED HEALTH CARE EDUCATION/TRAINING PROGRAM

## 2023-07-12 PROCEDURE — 36600 WITHDRAWAL OF ARTERIAL BLOOD: CPT

## 2023-07-12 PROCEDURE — 25010000002 FUROSEMIDE PER 20 MG: Performed by: STUDENT IN AN ORGANIZED HEALTH CARE EDUCATION/TRAINING PROGRAM

## 2023-07-12 PROCEDURE — 87040 BLOOD CULTURE FOR BACTERIA: CPT | Performed by: STUDENT IN AN ORGANIZED HEALTH CARE EDUCATION/TRAINING PROGRAM

## 2023-07-12 PROCEDURE — 63710000001 INSULIN DETEMIR PER 5 UNITS: Performed by: INTERNAL MEDICINE

## 2023-07-12 RX ORDER — AZITHROMYCIN 250 MG/1
250 TABLET, FILM COATED ORAL DAILY
COMMUNITY
Start: 2023-07-11 | End: 2023-07-15 | Stop reason: HOSPADM

## 2023-07-12 RX ORDER — BENZONATATE 100 MG/1
100 CAPSULE ORAL 3 TIMES DAILY PRN
Status: DISCONTINUED | OUTPATIENT
Start: 2023-07-12 | End: 2023-07-15 | Stop reason: HOSPADM

## 2023-07-12 RX ORDER — ISOSORBIDE MONONITRATE 60 MG/1
60 TABLET, EXTENDED RELEASE ORAL DAILY
Status: DISCONTINUED | OUTPATIENT
Start: 2023-07-12 | End: 2023-07-15 | Stop reason: HOSPADM

## 2023-07-12 RX ORDER — IPRATROPIUM BROMIDE AND ALBUTEROL SULFATE 2.5; .5 MG/3ML; MG/3ML
3 SOLUTION RESPIRATORY (INHALATION) ONCE
Status: COMPLETED | OUTPATIENT
Start: 2023-07-12 | End: 2023-07-12

## 2023-07-12 RX ORDER — SODIUM CHLORIDE 0.9 % (FLUSH) 0.9 %
10 SYRINGE (ML) INJECTION AS NEEDED
Status: DISCONTINUED | OUTPATIENT
Start: 2023-07-12 | End: 2023-07-15 | Stop reason: HOSPADM

## 2023-07-12 RX ORDER — INSULIN ASPART 100 [IU]/ML
1-200 INJECTION, SOLUTION INTRAVENOUS; SUBCUTANEOUS AS NEEDED
Status: DISCONTINUED | OUTPATIENT
Start: 2023-07-12 | End: 2023-07-15 | Stop reason: HOSPADM

## 2023-07-12 RX ORDER — METOPROLOL SUCCINATE 50 MG/1
50 TABLET, EXTENDED RELEASE ORAL DAILY
Status: DISCONTINUED | OUTPATIENT
Start: 2023-07-12 | End: 2023-07-15 | Stop reason: HOSPADM

## 2023-07-12 RX ORDER — ACETAMINOPHEN 325 MG/1
650 TABLET ORAL EVERY 4 HOURS PRN
Status: DISCONTINUED | OUTPATIENT
Start: 2023-07-12 | End: 2023-07-15 | Stop reason: HOSPADM

## 2023-07-12 RX ORDER — SODIUM CHLORIDE 0.9 % (FLUSH) 0.9 %
10 SYRINGE (ML) INJECTION EVERY 12 HOURS SCHEDULED
Status: DISCONTINUED | OUTPATIENT
Start: 2023-07-12 | End: 2023-07-15 | Stop reason: HOSPADM

## 2023-07-12 RX ORDER — PANTOPRAZOLE SODIUM 40 MG/1
40 TABLET, DELAYED RELEASE ORAL
Status: DISCONTINUED | OUTPATIENT
Start: 2023-07-13 | End: 2023-07-15 | Stop reason: HOSPADM

## 2023-07-12 RX ORDER — FUROSEMIDE 10 MG/ML
40 INJECTION INTRAMUSCULAR; INTRAVENOUS
Status: COMPLETED | OUTPATIENT
Start: 2023-07-12 | End: 2023-07-13

## 2023-07-12 RX ORDER — ACETAMINOPHEN 500 MG
1000 TABLET ORAL ONCE
Status: COMPLETED | OUTPATIENT
Start: 2023-07-12 | End: 2023-07-12

## 2023-07-12 RX ORDER — CHLORDIAZEPOXIDE HYDROCHLORIDE 5 MG/1
5 CAPSULE, GELATIN COATED ORAL 2 TIMES DAILY
Status: DISCONTINUED | OUTPATIENT
Start: 2023-07-12 | End: 2023-07-15 | Stop reason: HOSPADM

## 2023-07-12 RX ORDER — NICOTINE POLACRILEX 4 MG
15 LOZENGE BUCCAL
Status: DISCONTINUED | OUTPATIENT
Start: 2023-07-12 | End: 2023-07-15 | Stop reason: HOSPADM

## 2023-07-12 RX ORDER — LISINOPRIL 20 MG/1
20 TABLET ORAL DAILY
Status: DISCONTINUED | OUTPATIENT
Start: 2023-07-12 | End: 2023-07-15 | Stop reason: HOSPADM

## 2023-07-12 RX ORDER — ACETAMINOPHEN 160 MG/5ML
650 SOLUTION ORAL EVERY 4 HOURS PRN
Status: DISCONTINUED | OUTPATIENT
Start: 2023-07-12 | End: 2023-07-15 | Stop reason: HOSPADM

## 2023-07-12 RX ORDER — METHYLPREDNISOLONE SODIUM SUCCINATE 40 MG/ML
40 INJECTION, POWDER, LYOPHILIZED, FOR SOLUTION INTRAMUSCULAR; INTRAVENOUS EVERY 12 HOURS
Status: DISCONTINUED | OUTPATIENT
Start: 2023-07-12 | End: 2023-07-14

## 2023-07-12 RX ORDER — AMOXICILLIN 250 MG
2 CAPSULE ORAL 2 TIMES DAILY
Status: DISCONTINUED | OUTPATIENT
Start: 2023-07-12 | End: 2023-07-15 | Stop reason: HOSPADM

## 2023-07-12 RX ORDER — IPRATROPIUM BROMIDE AND ALBUTEROL SULFATE 2.5; .5 MG/3ML; MG/3ML
3 SOLUTION RESPIRATORY (INHALATION)
Status: DISCONTINUED | OUTPATIENT
Start: 2023-07-12 | End: 2023-07-15 | Stop reason: HOSPADM

## 2023-07-12 RX ORDER — LABETALOL HYDROCHLORIDE 5 MG/ML
10 INJECTION, SOLUTION INTRAVENOUS EVERY 4 HOURS PRN
Status: DISCONTINUED | OUTPATIENT
Start: 2023-07-12 | End: 2023-07-15 | Stop reason: HOSPADM

## 2023-07-12 RX ORDER — CHOLECALCIFEROL (VITAMIN D3) 125 MCG
5 CAPSULE ORAL NIGHTLY
Status: DISCONTINUED | OUTPATIENT
Start: 2023-07-12 | End: 2023-07-15 | Stop reason: HOSPADM

## 2023-07-12 RX ORDER — LORAZEPAM 2 MG/ML
1 INJECTION INTRAMUSCULAR ONCE
Status: COMPLETED | OUTPATIENT
Start: 2023-07-12 | End: 2023-07-12

## 2023-07-12 RX ORDER — DEXTROSE MONOHYDRATE 25 G/50ML
10-50 INJECTION, SOLUTION INTRAVENOUS
Status: DISCONTINUED | OUTPATIENT
Start: 2023-07-12 | End: 2023-07-15 | Stop reason: HOSPADM

## 2023-07-12 RX ORDER — POLYETHYLENE GLYCOL 3350 17 G/17G
17 POWDER, FOR SOLUTION ORAL DAILY PRN
Status: DISCONTINUED | OUTPATIENT
Start: 2023-07-12 | End: 2023-07-15 | Stop reason: HOSPADM

## 2023-07-12 RX ORDER — INSULIN ASPART 100 [IU]/ML
1-200 INJECTION, SOLUTION INTRAVENOUS; SUBCUTANEOUS
Status: DISCONTINUED | OUTPATIENT
Start: 2023-07-12 | End: 2023-07-15 | Stop reason: HOSPADM

## 2023-07-12 RX ORDER — NITROGLYCERIN 0.4 MG/1
0.4 TABLET SUBLINGUAL
Status: DISCONTINUED | OUTPATIENT
Start: 2023-07-12 | End: 2023-07-15 | Stop reason: HOSPADM

## 2023-07-12 RX ORDER — HYDROCODONE BITARTRATE AND ACETAMINOPHEN 5; 325 MG/1; MG/1
1 TABLET ORAL EVERY 6 HOURS PRN
Status: DISCONTINUED | OUTPATIENT
Start: 2023-07-12 | End: 2023-07-15 | Stop reason: HOSPADM

## 2023-07-12 RX ORDER — LEVOTHYROXINE SODIUM 0.07 MG/1
75 TABLET ORAL DAILY
Status: DISCONTINUED | OUTPATIENT
Start: 2023-07-12 | End: 2023-07-15 | Stop reason: HOSPADM

## 2023-07-12 RX ORDER — GUAIFENESIN AND PSEUDOEPHEDRINE HCL 1200; 120 MG/1; MG/1
TABLET, EXTENDED RELEASE ORAL
COMMUNITY
End: 2023-07-15 | Stop reason: HOSPADM

## 2023-07-12 RX ORDER — SODIUM CHLORIDE 9 MG/ML
40 INJECTION, SOLUTION INTRAVENOUS AS NEEDED
Status: DISCONTINUED | OUTPATIENT
Start: 2023-07-12 | End: 2023-07-15 | Stop reason: HOSPADM

## 2023-07-12 RX ORDER — BISACODYL 5 MG/1
5 TABLET, DELAYED RELEASE ORAL DAILY PRN
Status: DISCONTINUED | OUTPATIENT
Start: 2023-07-12 | End: 2023-07-15 | Stop reason: HOSPADM

## 2023-07-12 RX ORDER — FLUTICASONE PROPIONATE 50 MCG
1 SPRAY, SUSPENSION (ML) NASAL DAILY
Status: DISCONTINUED | OUTPATIENT
Start: 2023-07-12 | End: 2023-07-15 | Stop reason: HOSPADM

## 2023-07-12 RX ORDER — FUROSEMIDE 10 MG/ML
80 INJECTION INTRAMUSCULAR; INTRAVENOUS ONCE
Status: COMPLETED | OUTPATIENT
Start: 2023-07-12 | End: 2023-07-12

## 2023-07-12 RX ORDER — ONDANSETRON 2 MG/ML
4 INJECTION INTRAMUSCULAR; INTRAVENOUS EVERY 6 HOURS PRN
Status: DISCONTINUED | OUTPATIENT
Start: 2023-07-12 | End: 2023-07-15 | Stop reason: HOSPADM

## 2023-07-12 RX ORDER — IBUPROFEN 600 MG/1
1 TABLET ORAL
Status: DISCONTINUED | OUTPATIENT
Start: 2023-07-12 | End: 2023-07-15 | Stop reason: HOSPADM

## 2023-07-12 RX ORDER — MIRTAZAPINE 15 MG/1
15 TABLET, ORALLY DISINTEGRATING ORAL NIGHTLY
Status: DISCONTINUED | OUTPATIENT
Start: 2023-07-12 | End: 2023-07-15 | Stop reason: HOSPADM

## 2023-07-12 RX ORDER — ACETAMINOPHEN 650 MG/1
650 SUPPOSITORY RECTAL EVERY 4 HOURS PRN
Status: DISCONTINUED | OUTPATIENT
Start: 2023-07-12 | End: 2023-07-15 | Stop reason: HOSPADM

## 2023-07-12 RX ORDER — METHYLPREDNISOLONE SODIUM SUCCINATE 125 MG/2ML
125 INJECTION, POWDER, LYOPHILIZED, FOR SOLUTION INTRAMUSCULAR; INTRAVENOUS ONCE
Status: COMPLETED | OUTPATIENT
Start: 2023-07-12 | End: 2023-07-12

## 2023-07-12 RX ORDER — BISACODYL 10 MG
10 SUPPOSITORY, RECTAL RECTAL DAILY PRN
Status: DISCONTINUED | OUTPATIENT
Start: 2023-07-12 | End: 2023-07-15 | Stop reason: HOSPADM

## 2023-07-12 RX ORDER — AMLODIPINE BESYLATE 5 MG/1
10 TABLET ORAL
Status: DISCONTINUED | OUTPATIENT
Start: 2023-07-12 | End: 2023-07-15 | Stop reason: HOSPADM

## 2023-07-12 RX ADMIN — METHYLPREDNISOLONE SODIUM SUCCINATE 125 MG: 125 INJECTION, POWDER, FOR SOLUTION INTRAMUSCULAR; INTRAVENOUS at 03:40

## 2023-07-12 RX ADMIN — ONDANSETRON 4 MG: 2 INJECTION INTRAMUSCULAR; INTRAVENOUS at 21:34

## 2023-07-12 RX ADMIN — DOXYCYCLINE 100 MG: 100 INJECTION, POWDER, LYOPHILIZED, FOR SOLUTION INTRAVENOUS at 16:28

## 2023-07-12 RX ADMIN — ONDANSETRON 4 MG: 2 INJECTION INTRAMUSCULAR; INTRAVENOUS at 03:40

## 2023-07-12 RX ADMIN — APIXABAN 5 MG: 5 TABLET, FILM COATED ORAL at 21:10

## 2023-07-12 RX ADMIN — INSULIN ASPART 4 UNITS: 100 INJECTION, SOLUTION INTRAVENOUS; SUBCUTANEOUS at 11:50

## 2023-07-12 RX ADMIN — IPRATROPIUM BROMIDE AND ALBUTEROL SULFATE 3 ML: .5; 3 SOLUTION RESPIRATORY (INHALATION) at 00:44

## 2023-07-12 RX ADMIN — INSULIN ASPART 6 UNITS: 100 INJECTION, SOLUTION INTRAVENOUS; SUBCUTANEOUS at 17:05

## 2023-07-12 RX ADMIN — ACETAMINOPHEN 1000 MG: 500 TABLET, FILM COATED ORAL at 01:57

## 2023-07-12 RX ADMIN — Medication 5 MG: at 21:10

## 2023-07-12 RX ADMIN — INSULIN ASPART 4 UNITS: 100 INJECTION, SOLUTION INTRAVENOUS; SUBCUTANEOUS at 08:32

## 2023-07-12 RX ADMIN — INSULIN ASPART 6 UNITS: 100 INJECTION, SOLUTION INTRAVENOUS; SUBCUTANEOUS at 21:11

## 2023-07-12 RX ADMIN — HYDROCODONE BITARTRATE AND ACETAMINOPHEN 1 TABLET: 5; 325 TABLET ORAL at 21:23

## 2023-07-12 RX ADMIN — IPRATROPIUM BROMIDE AND ALBUTEROL SULFATE 3 ML: .5; 3 SOLUTION RESPIRATORY (INHALATION) at 18:12

## 2023-07-12 RX ADMIN — SENNOSIDES AND DOCUSATE SODIUM 2 TABLET: 50; 8.6 TABLET ORAL at 08:32

## 2023-07-12 RX ADMIN — INSULIN DETEMIR 19 UNITS: 100 INJECTION, SOLUTION SUBCUTANEOUS at 21:11

## 2023-07-12 RX ADMIN — LEVOTHYROXINE SODIUM 75 MCG: 75 TABLET ORAL at 12:59

## 2023-07-12 RX ADMIN — FUROSEMIDE 40 MG: 10 INJECTION, SOLUTION INTRAMUSCULAR; INTRAVENOUS at 17:05

## 2023-07-12 RX ADMIN — Medication 10 ML: at 03:40

## 2023-07-12 RX ADMIN — IPRATROPIUM BROMIDE AND ALBUTEROL SULFATE 3 ML: .5; 3 SOLUTION RESPIRATORY (INHALATION) at 06:48

## 2023-07-12 RX ADMIN — FUROSEMIDE 80 MG: 10 INJECTION, SOLUTION INTRAMUSCULAR; INTRAVENOUS at 00:45

## 2023-07-12 RX ADMIN — SENNOSIDES AND DOCUSATE SODIUM 2 TABLET: 50; 8.6 TABLET ORAL at 21:34

## 2023-07-12 RX ADMIN — IPRATROPIUM BROMIDE AND ALBUTEROL SULFATE 3 ML: .5; 3 SOLUTION RESPIRATORY (INHALATION) at 12:31

## 2023-07-12 RX ADMIN — ISOSORBIDE MONONITRATE 60 MG: 60 TABLET, EXTENDED RELEASE ORAL at 12:58

## 2023-07-12 RX ADMIN — CHLORDIAZEPOXIDE HYDROCHLORIDE 5 MG: 5 CAPSULE ORAL at 12:58

## 2023-07-12 RX ADMIN — BENZONATATE 100 MG: 100 CAPSULE ORAL at 21:11

## 2023-07-12 RX ADMIN — Medication 10 ML: at 21:35

## 2023-07-12 RX ADMIN — CHLORDIAZEPOXIDE HYDROCHLORIDE 5 MG: 5 CAPSULE ORAL at 21:11

## 2023-07-12 RX ADMIN — NITROGLYCERIN 1 INCH: 20 OINTMENT TOPICAL at 02:12

## 2023-07-12 RX ADMIN — LABETALOL HYDROCHLORIDE 10 MG: 5 INJECTION, SOLUTION INTRAVENOUS at 21:11

## 2023-07-12 RX ADMIN — ACETAMINOPHEN 650 MG: 325 TABLET, FILM COATED ORAL at 19:43

## 2023-07-12 RX ADMIN — DOXYCYCLINE 100 MG: 100 INJECTION, POWDER, LYOPHILIZED, FOR SOLUTION INTRAVENOUS at 03:54

## 2023-07-12 RX ADMIN — MIRTAZAPINE 15 MG: 15 TABLET, ORALLY DISINTEGRATING ORAL at 21:10

## 2023-07-12 RX ADMIN — METHYLPREDNISOLONE SODIUM SUCCINATE 40 MG: 40 INJECTION, POWDER, FOR SOLUTION INTRAMUSCULAR; INTRAVENOUS at 14:37

## 2023-07-12 RX ADMIN — LORAZEPAM 1 MG: 2 INJECTION INTRAMUSCULAR; INTRAVENOUS at 03:40

## 2023-07-12 RX ADMIN — FUROSEMIDE 40 MG: 10 INJECTION, SOLUTION INTRAMUSCULAR; INTRAVENOUS at 08:32

## 2023-07-12 RX ADMIN — Medication 10 ML: at 08:35

## 2023-07-12 NOTE — CASE MANAGEMENT/SOCIAL WORK
Discharge Planning Assessment  [facility]     Patient Name: Breonna Gongora  MRN: [id number]  Today's Date: 7/12/2023    Admit Date: 7/11/2023    Plan: Pt will be returning to [facility] at discharge. Pt has a bed hold.  No barriers to discharge identified.   Discharge Needs Assessment       Row Name 07/12/23 1615       Living Environment    People in Home facility resident    Current Living Arrangements residential facility    Potentially Unsafe Housing Conditions unable to assess    Primary Care Provided by other (see comments)    Provides Primary Care For no one, unable/limited ability to care for self    Quality of Family Relationships helpful    Able to Return to Prior Arrangements yes       Food Insecurity    Within the past 12 months, you worried that your food would run out before you got the money to buy more. Never true    Within the past 12 months, the food you bought just didn't last and you didn't have money to get more. Never true       Transition Planning    Patient/Family Anticipates Transition to long-term care facility    Patient/Family Anticipated Services at Transition skilled nursing    Transportation Anticipated public transportation       Discharge Needs Assessment    Readmission Within the Last 30 Days no previous admission in last 30 days    Equipment Currently Used at Home bath bench;commode;grab bar;hospital bed;walker, rolling;wheelchair    Concerns to be Addressed no discharge needs identified    Equipment Needed After Discharge none    Patient's Choice of Community Agency(s) Pt is a resident at [facility]                   Discharge Plan       Row Name 07/12/23 1494       Plan    Plan Pt will be returning to [facility] at discharge. Pt has a bed hold.  No barriers to discharge identified.                  Continued Care and Services - Admitted Since 7/11/2023    Coordination has not been started for this encounter.       Selected Continued Care - Prior Encounters Includes continued care and  service providers with selected services from prior encounters from 4/12/2023 to 7/12/2023      Discharged on 5/16/2023 Admission date: 5/11/2023 - Discharge disposition: Intermediate Care       Destination       Service Provider Selected Services Address Phone Fax Patient Preferred    [facility] [address] [place] [phone] --                             Demographic Summary       Row Name 07/12/23 1614       General Information    Admission Type observation    Arrived From [facility]    Referral Source admission list    Reason for Consult discharge planning    Preferred Language English       Contact Information    Permission Granted to Share Info With     Contact Information Obtained for                    Functional Status       Row Name 07/12/23 1615       Functional Status    Usual Activity Tolerance fair    Current Activity Tolerance fair       Functional Status, IADL    Medications completely dependent    Meal Preparation completely dependent    Housekeeping completely dependent    Laundry completely dependent    Shopping completely dependent       Mental Status    General Appearance WDL WDL                   Psychosocial       Row Name 07/12/23 1615       Values/Beliefs    Spiritual, Cultural Beliefs, Nondenominational Practices, Values that Affect Care no       Coping/Stress    Major Change/Loss/Stressor illness    Patient Personal Strengths strong support system    Reaction to Health Status unable to assess    Understanding of Condition and Treatment unable to assess       Developmental Stage (Eriksson's)    Developmental Stage Stage 8 (65 years-death/Late Adulthood) Integrity vs. Despair       C-SSRS (Recent)    Q1 Wished to be Dead (Past Month) no    Q2 Suicidal Thoughts (Past Month) no    Q6 Suicide Behavior (Lifetime) no       Violence Risk    Feels Like Hurting Others no    Previous Attempt to Harm  Others no                   Abuse/Neglect    No documentation.                  Legal    No documentation.                  Substance Abuse    No documentation.                  Patient Forms    No documentation.                     Gilda Cline     Surgeon: Bryson Gonzalez MD

## 2023-07-12 NOTE — THERAPY DISCHARGE NOTE
Per chart review, patient is total care for ADL's, feeding and mobility as a LTC resident of [facility]. As such, she is not appropriate for skilled PT intervention and eval order will be discharged.

## 2023-07-12 NOTE — THERAPY DISCHARGE NOTE
Per chart review, patient is total care for ADLs, transfers and self feeding at [facility]. OT order to be discontinued.

## 2023-07-12 NOTE — PROGRESS NOTES
[facility]    Name:  Breonna Gongora   Age:  87 y.o.  Sex:  female  :  1935  MRN:  [id number]   Visit Number:  [id number]  Admission Date:  2023  Date Of Service:  23  Primary Care Physician:  Provider, No Known     LOS: 0 days :    Chief Complaint:      Follow-up of shortness of breath.    Subjective:    Ms. Gongora was seen and examined this morning.  She is currently lying down in the bed and is slightly drowsy.  She opens eyes on call and was able to answer basic questions.  Denies any shortness of breath or pain.  She did receive Ativan overnight.  She is currently on nasal cannula oxygen at 4 L and saturating in the mid 90s.  She was on BiPAP through the night.  Previous physician documentation, laboratory and imaging data have been reviewed.    Hospital Course:    Ms. Gongora is an 87-year-old woman, nursing home resident at [facility], with past medical history of COPD on 3 L baseline, heart failure preserved ejection fraction, hypertension, type 2 diabetes, atrial fibrillation, hypothyroidism, depression, history of stroke, hard of hearing, pulmonary hypertension was sent to the emergency room with concern for shortness of air and nonproductive cough for 3 days.  She recently been started on Zithromax at her nursing home.     ED summary: Afebrile, hypertensive as high as 190/89, otherwise vital signs stable on baseline oxygen 3 L.  Increased work of breath, appears diaphoretic.  EKG atrial fibrillation, no ST elevations or depressions.  ABG compensated; pH 7.43, PCO2 46, PO2 74, bicarb 31.  proBNP chronically elevated, currently over 12,000.  Sodium 130.  Blood glucose 248.  Gait normal.  White count 14./Flu negative.  CXR blunting left lower field chronic.  She was provided Tylenol, Lasix 80 mg IV, DuoNeb, Nitrostat for blood pressure and was subsequently admitted to the medical floor with telemetry.    Review of Systems:     All systems were  reviewed and negative except as mentioned in subjective, assessment and plan.    Vital Signs:    Temp:  [98.3 °F (36.8 °C)-98.8 °F (37.1 °C)] 98.8 °F (37.1 °C)  Heart Rate:  [71-89] 81  Resp:  [18-26] 18  BP: (131-198)/() 131/76    Intake and output:    I/O last 3 completed shifts:  In: 100 [IV Piggyback:100]  Out: -   No intake/output data recorded.    Physical Examination:    General Appearance:  Alert and cooperative. Drowsy but wakes up on call.   Head:  Atraumatic and normocephalic.   Eyes: Conjunctivae and sclerae normal, no icterus. No pallor.   Throat: No oral lesions, no thrush, oral mucosa moist.   Neck: Supple, trachea midline, no thyromegaly.   Lungs:   Breath sounds heard bilaterally equally.  No wheezing.  Occasional basal crackles. No Pleural rub or bronchial breathing.   Heart:  Irregular S1 and S2, no murmur, no gallop, no rub. No JVD.   Abdomen:   Normal bowel sounds, no masses, no organomegaly. Soft, nontender, obese, no rebound tenderness.   Extremities: Supple, no edema, no cyanosis, no clubbing.   Skin: No bleeding or rash.   Neurologic: Alert but drowsy. No facial asymmetry. Moves all four limbs but does have generalized weakness. No tremors.      Laboratory results:    Results from last 7 days   Lab Units 07/12/23  0335 07/11/23  2308   SODIUM mmol/L 132* 130*   POTASSIUM mmol/L 4.1 4.8   CHLORIDE mmol/L 92* 92*   CO2 mmol/L 27.5 26.7   BUN mg/dL 22 20   CREATININE mg/dL 0.86 0.82   CALCIUM mg/dL 9.3 9.0   BILIRUBIN mg/dL  --  0.3   ALK PHOS U/L  --  81   ALT (SGPT) U/L  --  6   AST (SGOT) U/L  --  12   GLUCOSE mg/dL 261* 248*     Results from last 7 days   Lab Units 07/12/23  0335 07/11/23  2244   WBC 10*3/mm3 15.11* 14.92*   HEMOGLOBIN g/dL 11.7* 11.3*   HEMATOCRIT % 36.9 35.6   PLATELETS 10*3/mm3 231 245         Results from last 7 days   Lab Units 07/12/23  0533 07/12/23  0335   HSTROP T ng/L 23* 23*         Recent Labs     07/12/23  0100   PHART 7.430   [id number] 46.9*   PO2ART 74.9*    [id number] 31.1*   BASEEXCESS 5.8*      I have reviewed the patient's laboratory results.    Radiology results:    XR Chest 1 View    Result Date: 7/12/2023  PROCEDURE: XR CHEST 1 VIEW-  INDICATION:  dyspnea  FINDINGS:  A portable view of the chest was obtained.  Comparison is made to a prior exam dated 05/11/2023.   Heart size is stable. There is an abnormal contour to the inferior right hilum. Mass or lymphadenopathy is not excluded. Interstitial opacities have worsened in the interval. Additionally, there is worsening left basilar airspace disease. A small left pleural effusion is stable. No pneumothorax.      Impression: 1. Abnormal right hilar contour. Mass or lymphadenopathy not excluded. Consider CT chest with contrast. 2. Worsening interstitial opacities. Favor pulmonary edema. 3. Worsening left basilar opacity, atelectasis versus pneumonia.  This report was signed and finalized on 7/12/2023 9:00 AM by Jo Ann Montero MD.   I have reviewed the patient's radiology reports.    Medication Review:     I have reviewed the patient's active and prn medications.     Problem List:      Acute on chronic heart failure with preserved ejection fraction (HFpEF)    Acute on chronic respiratory failure with hypoxia    COPD with acute exacerbation    Type 2 diabetes mellitus with unspecified complications    Chronic atrial fibrillation    Pulmonary hypertension    Assessment:    Acute on chronic hypoxic respiratory failure, POA.  Acute on chronic diastolic heart failure, POA.  Acute COPD exacerbation, POA.  Hyponatremia, not clinically significant, POA.  Diabetes mellitus type 2 on insulin.  Chronic atrial fibrillation on apixaban.  Essential hypertension.  Moderate to severe pulmonary hypertension.  Acquired hypothyroidism.    Plan:    Respiratory failure/diastolic heart failure.  - Continue nasal cannula oxygen with intermittent BiPAP therapy to maintain saturations above 90%.  - Continue IV Lasix 40 mg twice daily.  - We  will continue lisinopril and Toprol-XL from home medication list.  - Patient recently had an echocardiogram on 5/12/2023 which showed hyperdynamic left ventricular ejection fraction at 66%, grade 3 diastolic dysfunction as well as moderate to severe pulmonary hypertension.    Acute COPD exacerbation.  - Continue oxygen and BiPAP therapy.  - Continue bronchodilators, budesonide, IV Solu-Medrol and mucolytic agents.  - Continue antibiotics therapy with doxycycline.    Chronic atrial fibrillation.  - Continue Toprol-XL, apixaban.    Diabetes mellitus type 2.  - Continue Glucomander insulin protocol with basal and correction insulin.  - We will obtain hemoglobin A1c levels.    Essential hypertension/hypothyroidism.  - Continue amlodipine, Imdur, lisinopril and Toprol-XL.  - Continue levothyroxine.  - We will obtain TSH levels.    Patient is mostly bedbound at the nursing home facility and no indication for physical therapy.  I have discussed the patient's condition and treatment plan with her daughter Desiree over the phone today.  Discussed with nursing staff and multidisciplinary team.      DVT Prophylaxis: Apixaban  Code Status: DNR/DNI  Diet: Cardiac diabetic  Discharge Plan: Hopefully, back to SNF in 2 to 3 days.    Doug Vegas MD  07/12/23  12:31 EDT    Dictated utilizing Dragon dictation.

## 2023-07-12 NOTE — PLAN OF CARE
Goal Outcome Evaluation:  Plan of Care Reviewed With: patient        Progress: no change  Outcome Evaluation: New admit overnight. Continue to monitor.

## 2023-07-12 NOTE — H&P
[facility]      Name:  Breonna Gongora   Age:  87 y.o.  Sex:  female  :  1935  MRN:  [id number]   Visit Number:  [id number]  Admission Date:  2023  Date Of Service:  23  Primary Care Physician:  Provider, No Known    Chief Complaint:     Shortness of air, cough    History Of Presenting Illness:      Patient is an 87-year-old woman with past medical history of COPD on 3 L baseline, heart failure preserved ejection fraction, hypertension, type 2 diabetes, atrial fibrillation, hypothyroidism, depression, history of stroke, hard of hearing, pulmonary hypertension.  Nursing home resident at [place] [facility].  [facility]ed [facility] ED on [facility]23 with concern for shortness of air seen, and nonproductive cough for 3 days.  She recently been started on Zithromax at her nursing home.  Denied any known fevers, although she has been diaphoretic.  Denied any chest pain, abdominal pain, nausea and vomiting, diarrhea.    ED summary: Afebrile, hypertensive as high as 190/89, otherwise vital signs stable on baseline oxygen 3 L.  Increased work of breath, appears diaphoretic.  EKG atrial fibrillation, no ST elevations or depressions.  ABG compensated; pH 7.43, PCO2 46, PO2 74, bicarb 31.  proBNP chronically elevated, currently over 12,000.  Sodium 130.  Blood glucose 248.  Gait normal.  White count 14./Flu negative.  CXR blunting left lower field chronic.  She was provided Tylenol, Lasix 80 mg IV, DuoNeb, Nitrostat for blood pressure.    Review Of Systems:    All systems were reviewed and negative except as mentioned in history of presenting illness, assessment and plan.    Past Medical History: Patient  has a past medical history of Allergic, Anemia, CHF (congestive heart failure), Depression, Hypothyroidism, Paroxysmal A-fib, and Stroke.    Past Surgical History: Patient  has a past surgical history that includes Total abdominal hysterectomy w/ bilateral salpingoophorectomy;  Appendectomy; and Cataract extraction (N/A).    Social History: Patient  reports that she has never smoked. She has never used smokeless tobacco. She reports that she does not drink alcohol and does not use drugs.    Family History:  Patient's family history has been reviewed and found to be noncontributory.     Allergies:      Penicillins and Sulfa antibiotics    Home Medications:    Prior to Admission Medications       Prescriptions Last Dose Informant Patient Reported? Taking?    acetaminophen (TYLENOL) 325 MG tablet   Yes No    Take 2 tablets by mouth Every 4 (Four) Hours As Needed for Mild Pain.    amLODIPine (NORVASC) 10 MG tablet   No No    Take 1 tablet by mouth Daily.    apixaban (ELIQUIS) 5 MG tablet tablet   Yes No    Take 1 tablet by mouth 2 (Two) Times a Day.    carbamide peroxide (DEBROX) 6.5 % otic solution   No No    Administer 5 drops into both ears 2 (Two) Times a Day.    chlordiazePOXIDE (LIBRIUM) 5 MG capsule   No No    Take 1 capsule by mouth 2 (Two) Times a Day.    cholecalciferol (VITAMIN D3) 25 MCG (1000 UT) tablet   Yes No    Take 1 tablet by mouth Daily.    clopidogrel (PLAVIX) 75 MG tablet   Yes No    Take 1 tablet by mouth Daily.    Cyanocobalamin 1000 MCG/ML kit   Yes No    Inject 1 dose as directed Every 30 (Thirty) Days. On the 17th of each month    docusate sodium (COLACE) 100 MG capsule   Yes No    Take 1 capsule by mouth Every Night.    fluticasone (FLONASE) 50 MCG/ACT nasal spray   Yes No    1 spray into the nostril(s) as directed by provider Daily.    furosemide (Lasix) 20 MG tablet   No No    Take 1 tablet by mouth 2 (Two) Times a Day for 30 days.    HYDROcodone-acetaminophen (NORCO) 5-325 MG per tablet   No No    Take 1 tablet by mouth Every 6 (Six) Hours As Needed for Moderate Pain.    Insulin Aspart (novoLOG) 100 UNIT/ML injection   Yes No    Inject  under the skin into the appropriate area as directed 3 (Three) Times a Day Before Meals.    insulin detemir (LEVEMIR) 100  UNIT/ML injection   No No    Inject 20 Units under the skin into the appropriate area as directed Every Night.    Insulin Lispro, 1 Unit Dial, (HUMALOG) 100 UNIT/ML solution pen-injector   Yes No    Inject  under the skin into the appropriate area as directed. Sliding scale before meals and at hs    Patient not taking:  Reported on 5/12/2023    ipratropium-albuterol (DUO-NEB) 0.5-2.5 mg/3 ml nebulizer   Yes No    Take 3 mL by nebulization Every 6 (Six) Hours As Needed for Wheezing.    isosorbide mononitrate (IMDUR) 30 MG 24 hr tablet   Yes No    Take 2 tablets by mouth Daily.    levothyroxine (SYNTHROID, LEVOTHROID) 75 MCG tablet   Yes No    Take 1 tablet by mouth Daily.    linagliptin (TRADJENTA) 5 MG tablet tablet   Yes No    Take 5 mg by mouth Daily.    Patient not taking:  Reported on 5/12/2023    lisinopril (PRINIVIL,ZESTRIL) 20 MG tablet   Yes No    Take 1 tablet by mouth Daily.    loratadine (CLARITIN) 10 MG tablet   Yes No    Take 1 tablet by mouth Daily.    LORazepam (ATIVAN) 0.5 MG tablet   No No    Take 1 tablet by mouth Every 6 (Six) Hours As Needed for Anxiety.    magnesium oxide (MAG-OX) 400 MG tablet   Yes No    Take 1 tablet by mouth 2 (Two) Times a Week. TUES AND FRID    Melatonin 10 MG tablet   Yes No    Take 1 tablet by mouth Every Night.    metoprolol succinate XL (TOPROL-XL) 50 MG 24 hr tablet   Yes No    Take 1 tablet by mouth Daily.    mirtazapine (REMERON SOL-TAB) 15 MG disintegrating tablet   Yes No    Place 1 tablet on the tongue Every Night.    naloxone (NARCAN) 4 MG/0.1ML nasal spray   No No    Call 911. Don't prime. Cromona in 1 nostril for overdose. Repeat in 2-3 minutes in other nostril if no or minimal breathing/responsiveness.    nitroglycerin (NITROSTAT) 0.4 MG SL tablet   Yes No    Place 1 tablet under the tongue Every 5 (Five) Minutes As Needed for Chest Pain. Take no more than 3 doses in 15 minutes.    nystatin (MYCOSTATIN) 892787 UNIT/GM ointment   Yes No    Apply 1 application  "topically to the appropriate area as directed 3 (Three) Times a Day.    pantoprazole (PROTONIX) 40 MG pack packet   Yes No    Take 1 packet by mouth Every Morning Before Breakfast.    polyethylene glycol (MIRALAX) 17 GM/SCOOP powder   Yes No    Take 17 g by mouth 2 (Two) Times a Day.    potassium chloride (K-DUR,KLOR-CON) 20 MEQ CR tablet   Yes No    Take 1 tablet by mouth Daily.    sertraline (ZOLOFT) 50 MG tablet   Yes No    Take 1 tablet by mouth Daily.    simethicone (MYLICON) 80 MG chewable tablet   Yes No    Chew 1 tablet Every 6 (Six) Hours As Needed for Flatulence. 2 TABS PO PRN    SITagliptin (JANUVIA) 100 MG tablet   Yes No    Take 1 tablet by mouth Daily.    sucralfate (CARAFATE) 1 g tablet   Yes No    Take 1 tablet by mouth 4 (Four) Times a Day.    Zinc Oxide (Boudreauxs Butt Paste) 16 % ointment   Yes No    Apply  topically. Applied every shift          ED Medications:    Medications   sodium chloride 0.9 % flush 10 mL (has no administration in time range)   furosemide (LASIX) injection 80 mg (80 mg Intravenous Given 7/12/23 0045)   ipratropium-albuterol (DUO-NEB) nebulizer solution 3 mL (3 mL Nebulization Given 7/12/23 0044)   ipratropium-albuterol (DUO-NEB) nebulizer solution 3 mL (3 mL Nebulization Given 7/12/23 0044)   acetaminophen (TYLENOL) tablet 1,000 mg (1,000 mg Oral Given 7/12/23 0157)   nitroglycerin (NITROSTAT) ointment 1 inch (1 inch Topical Given 7/12/23 0212)     Vital Signs:  Temp:  [98.3 °F (36.8 °C)] 98.3 °F (36.8 °C)  Heart Rate:  [71-89] 89  Resp:  [22] 22  BP: (161-198)/(79-97) 178/95        07/11/23 2233   Weight: 72.6 kg (160 lb)     Body mass index is 31.25 kg/m².    Physical Exam:     Most recent vital Signs: /95   Pulse 89   Temp 98.3 °F (36.8 °C) (Oral)   Resp 22   Ht 152.4 cm (60\")   Wt 72.6 kg (160 lb)   SpO2 94%   BMI 31.25 kg/m²     Physical Exam  Constitutional:       General: She is in acute distress.      Appearance: She is ill-appearing and " toxic-appearing.   HENT:      Mouth/Throat:      Mouth: Mucous membranes are moist.   Eyes:      Extraocular Movements: Extraocular movements intact.   Cardiovascular:      Rate and Rhythm: Normal rate. Rhythm irregular.      Pulses: Normal pulses.      Heart sounds: Normal heart sounds.   Pulmonary:      Comments: Increased work of breathing, wheezing in all fields, crackles present, moderately diminished but airflow heard well in all fields  Abdominal:      Palpations: Abdomen is soft.      Tenderness: There is no abdominal tenderness.   Musculoskeletal:      Right lower leg: Edema present.      Left lower leg: Edema present.   Skin:     General: Skin is warm.   Neurological:      General: No focal deficit present.      Mental Status: She is alert and oriented to person, place, and time.   Psychiatric:         Mood and Affect: Mood normal.         Thought Content: Thought content normal.       Laboratory data:    I have reviewed the labs done in the emergency room.    Results from last 7 days   Lab Units 07/11/23  2308   SODIUM mmol/L 130*   POTASSIUM mmol/L 4.8   CHLORIDE mmol/L 92*   CO2 mmol/L 26.7   BUN mg/dL 20   CREATININE mg/dL 0.82   CALCIUM mg/dL 9.0   BILIRUBIN mg/dL 0.3   ALK PHOS U/L 81   ALT (SGPT) U/L 6   AST (SGOT) U/L 12   GLUCOSE mg/dL 248*     Results from last 7 days   Lab Units 07/11/23  2244   WBC 10*3/mm3 14.92*   HEMOGLOBIN g/dL 11.3*   HEMATOCRIT % 35.6   PLATELETS 10*3/mm3 245             Results from last 7 days   Lab Units 07/11/23  2308   PROBNP pg/mL 12,534.0*             Results from last 7 days   Lab Units 07/12/23  0100   PH, ARTERIAL pH units 7.430   PO2 ART mm Hg 74.9*   PCO2, ARTERIAL mm Hg 46.9*   HCO3 ART mmol/L 31.1*           Invalid input(s): USDES,  BLOODU, NITRITITE, BACT, EP    Pain Management Panel           No data to display                EKG:      EKG atrial fibrillation, no ST elevations or depressions.    Radiology:    No radiology results for the last 3  days    Assessment/Plan:    Observation admission 7/11/2023 with respiratory distress on baseline 3 L, etiology may be some combination of CHF exacerbation, COPD exacerbation, may also have infectious component.  Not clear what may have been initial factor as it clinically appears to be multifactorial.    Patient is hard of hearing, asked that I speak to her daughter at bedside.  Daughter was able to write some things down on a white board and communicate well with her.  At time of admission patient on her baseline 3 L, was able to communicate in full sentences, although did have increased work of breathing and looked diaphoretic.  Starting BiPAP and providing some anxiolytic.    CHF exacerbation, POA  COPD exacerbation, POA  Supplemental oxygen as needed keep saturation above 90%.  3 Liters baseline.  Solu-Medrol, DuoNebs.  Doxycycline.  Blood cultures.  Respiratory PCR panel.  Procalcitonin.    Hyponatremia, POA  Monitor response to diuresis.    Chronic:  COPD on 3 L baseline, heart failure preserved ejection fraction, hypertension, type 2 diabetes, atrial fibrillation, hypothyroidism, depression, history of stroke, hard of hearing, pulmonary hypertension.  Nursing home resident at [place].    Hold home diabetic medications.  Provide Glucomander subcutaneous insulin protocol.  Continue other home medications.    Risk Assessment: High  DVT Prophylaxis: Continue home Eliquis  Code Status: DNR/DNI  Diet: Heart healthy/carbohydrate controlled/fluid restriction    Advance Care Planning   ACP discussion was held with the patient during this visit. Patient has an advance directive in EMR which is still valid.            Brian Joseph Kerley, DO  07/12/23  02:55 EDT    Dictated utilizing Dragon dictation.

## 2023-07-12 NOTE — ED NOTES
"Pt stating need to defecate. Removed brief, placed on bedpan, patient stated they wanted to \"sit on it for a while, see if anything came out,\". Pt given call light and resting comfortably  "

## 2023-07-12 NOTE — PLAN OF CARE
Problem: Adult Inpatient Plan of Care  Goal: Plan of Care Review  Outcome: Ongoing, Progressing     Problem: Adult Inpatient Plan of Care  Goal: Patient-Specific Goal (Individualized)  Outcome: Ongoing, Progressing   Goal Outcome Evaluation:         No acute events during the day. Will continue current plan of care.

## 2023-07-12 NOTE — ED PROVIDER NOTES
"Subjective  History of Present Illness:    Chief Complaint: 3 days of shortness of breath productive cough    History of Present Illness: 87-year-old female history of CHF, here with wheezing productive cough over the last few days  , Wears 3 L nasal cannula oxygen at home.  History of paroxysmal A-fib, worsening symptoms.  Started on Zithromax with persistent symptoms.  Nurses Notes reviewed and agree, including vitals, allergies, social history and prior medical history.     REVIEW OF SYSTEMS: All systems reviewed and not pertinent unless noted.  Review of Systems      Positive for: Short of breath wheezing productive cough    Negative for: Fever hemoptysis palpitations    Past Medical History:   Diagnosis Date    Allergic     Anemia     CHF (congestive heart failure)     Depression     Hypothyroidism     Paroxysmal A-fib     Stroke        Allergies:    Penicillins and Sulfa antibiotics      Past Surgical History:   Procedure Laterality Date    APPENDECTOMY      CATARACT EXTRACTION N/A     TOTAL ABDOMINAL HYSTERECTOMY WITH SALPINGO OOPHORECTOMY           Social History     Socioeconomic History    Marital status: Unknown   Tobacco Use    Smoking status: Never    Smokeless tobacco: Never   Vaping Use    Vaping Use: Never used   Substance and Sexual Activity    Alcohol use: Never    Drug use: Never    Sexual activity: Defer         Family History   Problem Relation Age of Onset    COPD Mother     Heart disease Father     Alcohol abuse Father        Objective  Physical Exam:  /95   Pulse 89   Temp 98.3 °F (36.8 °C) (Oral)   Resp 22   Ht 152.4 cm (60\")   Wt 72.6 kg (160 lb)   SpO2 94%   BMI 31.25 kg/m²      Physical Exam    CONSTITUTIONAL: Well developed, frail elderly 87-year-old female,  in no acute distress.  VITAL SIGNS: per nursing, reviewed and noted  SKIN: exposed skin with no rashes, ulcerations or petechiae  EYES: Grossly EOMI, no icterus  ENT: Normal voice.  Moist mucous membranes   RESPIRATORY: " Diffuse wheezes, occasional coarse rhonchi  CARDIOVASCULAR:   Extremities pink and warm.  Good cap refill to extremities.  1+ lower extremity edema  GI: Abdomen without distention   MUSCULOSKELETAL: Age-appropriate bulk and tone, moves all 4 extremities  NEUROLOGIC: Alert, oriented x 3. No gross deficits. GCS 15.   PSYCH: appropriate affect.      Procedures    ED Course:    Lab Results (last 24 hours)       Procedure Component Value Units Date/Time    CBC & Differential [[id number]]  (Abnormal) Collected: 07/11/23 2244    Specimen: Blood Updated: 07/11/23 2251    Narrative:      The following orders were created for panel order CBC & Differential.  Procedure                               Abnormality         Status                     ---------                               -----------         ------                     CBC Auto Differential[[id number]]        Abnormal            Final result                 Please view results for these tests on the individual orders.    CBC Auto Differential [[id number]]  (Abnormal) Collected: 07/11/23 2244    Specimen: Blood Updated: 07/11/23 2251     WBC 14.92 10*3/mm3      RBC 4.02 10*6/mm3      Hemoglobin 11.3 g/dL      Hematocrit 35.6 %      MCV 88.6 fL      MCH 28.1 pg      MCHC 31.7 g/dL      RDW 14.2 %      RDW-SD 45.8 fl      MPV 12.0 fL      Platelets 245 10*3/mm3      Neutrophil % 86.5 %      Lymphocyte % 6.0 %      Monocyte % 6.6 %      Eosinophil % 0.1 %      Basophil % 0.3 %      Immature Grans % 0.5 %      Neutrophils, Absolute 12.90 10*3/mm3      Lymphocytes, Absolute 0.89 10*3/mm3      Monocytes, Absolute 0.99 10*3/mm3      Eosinophils, Absolute 0.01 10*3/mm3      Basophils, Absolute 0.05 10*3/mm3      Immature Grans, Absolute 0.08 10*3/mm3      nRBC 0.0 /100 WBC     COVID PRE-OP / PRE-PROCEDURE SCREENING ORDER (NO ISOLATION) - Swab, Nasopharynx [[id number]]  (Normal) Collected: 07/11/23 2307    Specimen: Swab from Nasopharynx Updated: 07/11/23 2332    Narrative:       The following orders were created for panel order COVID PRE-OP / PRE-PROCEDURE SCREENING ORDER (NO ISOLATION) - Swab, Nasopharynx.  Procedure                               Abnormality         Status                     ---------                               -----------         ------                     COVID-19 and FLU A/B PCR...[[id number]]  Normal              Final result                 Please view results for these tests on the individual orders.    COVID-19 and FLU A/B PCR - Swab, Nasopharynx [[id number]]  (Normal) Collected: 07/11/23 2307    Specimen: Swab from Nasopharynx Updated: 07/11/23 2332     COVID19 Not Detected     Influenza A PCR Not Detected     Influenza B PCR Not Detected    Narrative:      Fact sheet for providers: https://www.fda.gov/media/382819/download    Fact sheet for patients: https://www.fda.gov/media/523065/download    Test performed by PCR.    Comprehensive Metabolic Panel [[id number]]  (Abnormal) Collected: 07/11/23 2308    Specimen: Blood Updated: 07/11/23 2333     Glucose 248 mg/dL      Comment: Glucose >180, Hemoglobin A1C recommended.        BUN 20 mg/dL      Creatinine 0.82 mg/dL      Sodium 130 mmol/L      Potassium 4.8 mmol/L      Comment: Slight hemolysis detected by analyzer. Results may be affected.        Chloride 92 mmol/L      CO2 26.7 mmol/L      Calcium 9.0 mg/dL      Total Protein 7.2 g/dL      Albumin 3.4 g/dL      ALT (SGPT) 6 U/L      AST (SGOT) 12 U/L      Alkaline Phosphatase 81 U/L      Total Bilirubin 0.3 mg/dL      Globulin 3.8 gm/dL      A/G Ratio 0.9 g/dL      BUN/Creatinine Ratio 24.4     Anion Gap 11.3 mmol/L      eGFR 69.3 mL/min/1.73     Narrative:      GFR Normal >60  Chronic Kidney Disease <60  Kidney Failure <15    The GFR formula is only valid for adults with stable renal function between ages 18 and 70.    BNP [[id number]]  (Abnormal) Collected: 07/11/23 2308    Specimen: Blood Updated: 07/11/23 2333     proBNP 12,534.0 pg/mL     Narrative:      Among  patients with dyspnea, NT-proBNP is highly sensitive for the detection of acute congestive heart failure. In addition NT-proBNP of <300 pg/ml effectively rules out acute congestive heart failure with 99% negative predictive value.      Lactic Acid, Plasma [[id number]]  (Normal) Collected: 07/11/23 2308    Specimen: Blood Updated: 07/11/23 2326     Lactate 1.5 mmol/L     Blood Gas, Arterial With Co-Ox [[id number]]  (Abnormal) Collected: 07/12/23 0100    Specimen: Arterial Blood Updated: 07/12/23 0100     Site Right Radial     Vega's Test Positive     pH, Arterial 7.430 pH units      pCO2, Arterial 46.9 mm Hg      Comment: 83 Value above reference range        pO2, Arterial 74.9 mm Hg      Comment: 84 Value below reference range        HCO3, Arterial 31.1 mmol/L      Comment: 83 Value above reference range        Base Excess, Arterial 5.8 mmol/L      Comment: 83 Value above reference range        O2 Saturation, Arterial 96.8 %      Hematocrit, Blood Gas 37.5 %      Comment: 84 Value below reference range        Oxyhemoglobin 94.8 %      Methemoglobin 0.40 %      Carboxyhemoglobin 1.6 %      A-a DO2 16.5 mmHg      Barometric Pressure for Blood Gas 734 mmHg      Modality Nasal Cannula     Flow Rate 2.0 lpm      Ventilator Mode NA     Note --     Collected by MONA     Comment: Meter: [id number]     :  [id number]        pH, Temp Corrected --     pCO2, Temperature Corrected --     pO2, Temperature Corrected --             No radiology results from the last 24 hrs     MDM     Amount and/or Complexity of Data Reviewed  Clinical lab tests: reviewed  Tests in the radiology section of CPT®: reviewed  Tests in the medicine section of CPT®: reviewed  Independent visualization of images, tracings, or specimens: yes        ED Course as of 07/12/23 0230   Tue Jul 11, 2023   7015 EKG interpreted by me reveals atrial fibrillation at a rate of 78 low voltage EKG nonspecific T wave changes no ectopy no ischemic changes [PF]       ED Course User Index  [PF] Bernardino Coleman DO       Medical Decision Making:    Initial impression of presenting illness: 87-year-old female history of CHF, here with wheezing productive cough over the last few days, Wears 3 L nasal cannula oxygen at home.  History of paroxysmal A-fib, worsening symptoms.  Started on Zithromax with persistent symptoms.    DDX: includes but is not limited to: COPD, bronchitis, pneumonia CHF         Patient arrives hypertensive afebrile no tachycardia sats 93% with vitals interpreted by myself.     Pertinent features from physical exam: Diffuse wheezes, occasional rhonchi with phlegmy cough.  Continue observation in the ER revealed patient having increased work of breathing without resolution with ER interventions.    Initial diagnostic plan: Shortness of breath protocol labs and imaging, flu and COVID    Results from initial plan were reviewed and interpreted by me revealing COVID-negative chest x-ray with a chronic appearing left sided effusion    Diagnostic information from other sources: Old record review    Interventions / Re-evaluation: Lasix, Solu-Medrol, DuoNeb, then Nitropaste and BiPAP    Results/clinical rationale were discussed with patient and family    Consultations/Discussion of results with other physicians: Dr. Kerley hospitalist will admit      -----      Final diagnoses:   Acute on chronic congestive heart failure, unspecified heart failure type   COPD exacerbation            Bernardino Coleman DO  07/12/23 0230

## 2023-07-12 NOTE — SIGNIFICANT NOTE
Pt. unable to participate this a.m. in bedside swallow eval due to sedation and unable to rouse for safety with po trials.  Will f/u.

## 2023-07-12 NOTE — ED NOTES
Pt removed from bedpan and cleaned after bowel movement. Placed in clean brief, call light within reach

## 2023-07-13 PROBLEM — I50.9 ACUTE EXACERBATION OF CHF (CONGESTIVE HEART FAILURE): Status: ACTIVE | Noted: 2023-07-13

## 2023-07-13 LAB
ANION GAP SERPL CALCULATED.3IONS-SCNC: 10.3 MMOL/L (ref 5–15)
BUN SERPL-MCNC: 33 MG/DL (ref 8–23)
BUN/CREAT SERPL: 30 (ref 7–25)
CALCIUM SPEC-SCNC: 8.9 MG/DL (ref 8.6–10.5)
CHLORIDE SERPL-SCNC: 95 MMOL/L (ref 98–107)
CO2 SERPL-SCNC: 29.7 MMOL/L (ref 22–29)
CREAT SERPL-MCNC: 1.1 MG/DL (ref 0.57–1)
EGFRCR SERPLBLD CKD-EPI 2021: 48.7 ML/MIN/1.73
GLUCOSE BLDC GLUCOMTR-MCNC: 207 MG/DL (ref 70–130)
GLUCOSE BLDC GLUCOMTR-MCNC: 293 MG/DL (ref 70–130)
GLUCOSE BLDC GLUCOMTR-MCNC: 331 MG/DL (ref 70–130)
GLUCOSE BLDC GLUCOMTR-MCNC: 340 MG/DL (ref 70–130)
GLUCOSE SERPL-MCNC: 333 MG/DL (ref 65–99)
MAGNESIUM SERPL-MCNC: 1.8 MG/DL (ref 1.6–2.4)
POTASSIUM SERPL-SCNC: 4.2 MMOL/L (ref 3.5–5.2)
SODIUM SERPL-SCNC: 135 MMOL/L (ref 136–145)
TSH SERPL DL<=0.05 MIU/L-ACNC: 0.62 UIU/ML (ref 0.27–4.2)

## 2023-07-13 PROCEDURE — 82948 REAGENT STRIP/BLOOD GLUCOSE: CPT

## 2023-07-13 PROCEDURE — 94660 CPAP INITIATION&MGMT: CPT

## 2023-07-13 PROCEDURE — 63710000001 INSULIN DETEMIR PER 5 UNITS: Performed by: INTERNAL MEDICINE

## 2023-07-13 PROCEDURE — 92610 EVALUATE SWALLOWING FUNCTION: CPT

## 2023-07-13 PROCEDURE — 99232 SBSQ HOSP IP/OBS MODERATE 35: CPT | Performed by: INTERNAL MEDICINE

## 2023-07-13 PROCEDURE — 94799 UNLISTED PULMONARY SVC/PX: CPT

## 2023-07-13 PROCEDURE — 63710000001 INSULIN ASPART PER 5 UNITS: Performed by: INTERNAL MEDICINE

## 2023-07-13 PROCEDURE — 83735 ASSAY OF MAGNESIUM: CPT | Performed by: INTERNAL MEDICINE

## 2023-07-13 PROCEDURE — 84443 ASSAY THYROID STIM HORMONE: CPT | Performed by: INTERNAL MEDICINE

## 2023-07-13 PROCEDURE — 94664 DEMO&/EVAL PT USE INHALER: CPT

## 2023-07-13 PROCEDURE — 94761 N-INVAS EAR/PLS OXIMETRY MLT: CPT

## 2023-07-13 PROCEDURE — 25010000002 METHYLPREDNISOLONE PER 40 MG: Performed by: STUDENT IN AN ORGANIZED HEALTH CARE EDUCATION/TRAINING PROGRAM

## 2023-07-13 PROCEDURE — 80048 BASIC METABOLIC PNL TOTAL CA: CPT | Performed by: INTERNAL MEDICINE

## 2023-07-13 PROCEDURE — 25010000002 FUROSEMIDE PER 20 MG: Performed by: STUDENT IN AN ORGANIZED HEALTH CARE EDUCATION/TRAINING PROGRAM

## 2023-07-13 RX ORDER — BUDESONIDE 0.5 MG/2ML
0.5 INHALANT ORAL
Status: DISCONTINUED | OUTPATIENT
Start: 2023-07-13 | End: 2023-07-15 | Stop reason: HOSPADM

## 2023-07-13 RX ADMIN — SENNOSIDES AND DOCUSATE SODIUM 2 TABLET: 50; 8.6 TABLET ORAL at 08:09

## 2023-07-13 RX ADMIN — IPRATROPIUM BROMIDE AND ALBUTEROL SULFATE 3 ML: .5; 3 SOLUTION RESPIRATORY (INHALATION) at 18:13

## 2023-07-13 RX ADMIN — BUDESONIDE 0.5 MG: 0.5 INHALANT RESPIRATORY (INHALATION) at 13:13

## 2023-07-13 RX ADMIN — SENNOSIDES AND DOCUSATE SODIUM 2 TABLET: 50; 8.6 TABLET ORAL at 21:45

## 2023-07-13 RX ADMIN — INSULIN ASPART 8 UNITS: 100 INJECTION, SOLUTION INTRAVENOUS; SUBCUTANEOUS at 17:39

## 2023-07-13 RX ADMIN — METHYLPREDNISOLONE SODIUM SUCCINATE 40 MG: 40 INJECTION, POWDER, FOR SOLUTION INTRAMUSCULAR; INTRAVENOUS at 16:08

## 2023-07-13 RX ADMIN — BUDESONIDE 0.5 MG: 0.5 INHALANT RESPIRATORY (INHALATION) at 18:13

## 2023-07-13 RX ADMIN — IPRATROPIUM BROMIDE AND ALBUTEROL SULFATE 3 ML: .5; 3 SOLUTION RESPIRATORY (INHALATION) at 07:09

## 2023-07-13 RX ADMIN — LISINOPRIL 20 MG: 20 TABLET ORAL at 08:09

## 2023-07-13 RX ADMIN — LEVOTHYROXINE SODIUM 75 MCG: 75 TABLET ORAL at 08:09

## 2023-07-13 RX ADMIN — IPRATROPIUM BROMIDE AND ALBUTEROL SULFATE 3 ML: .5; 3 SOLUTION RESPIRATORY (INHALATION) at 23:56

## 2023-07-13 RX ADMIN — FUROSEMIDE 40 MG: 10 INJECTION, SOLUTION INTRAMUSCULAR; INTRAVENOUS at 17:39

## 2023-07-13 RX ADMIN — FUROSEMIDE 40 MG: 10 INJECTION, SOLUTION INTRAMUSCULAR; INTRAVENOUS at 08:09

## 2023-07-13 RX ADMIN — MIRTAZAPINE 15 MG: 15 TABLET, ORALLY DISINTEGRATING ORAL at 21:44

## 2023-07-13 RX ADMIN — Medication 10 ML: at 08:10

## 2023-07-13 RX ADMIN — DOXYCYCLINE 100 MG: 100 INJECTION, POWDER, LYOPHILIZED, FOR SOLUTION INTRAVENOUS at 05:11

## 2023-07-13 RX ADMIN — APIXABAN 5 MG: 5 TABLET, FILM COATED ORAL at 08:09

## 2023-07-13 RX ADMIN — AMLODIPINE BESYLATE 10 MG: 5 TABLET ORAL at 08:09

## 2023-07-13 RX ADMIN — IPRATROPIUM BROMIDE AND ALBUTEROL SULFATE 3 ML: .5; 3 SOLUTION RESPIRATORY (INHALATION) at 13:13

## 2023-07-13 RX ADMIN — INSULIN DETEMIR 25 UNITS: 100 INJECTION, SOLUTION SUBCUTANEOUS at 21:45

## 2023-07-13 RX ADMIN — Medication 400 MG: at 08:09

## 2023-07-13 RX ADMIN — IPRATROPIUM BROMIDE AND ALBUTEROL SULFATE 3 ML: .5; 3 SOLUTION RESPIRATORY (INHALATION) at 00:15

## 2023-07-13 RX ADMIN — CHLORDIAZEPOXIDE HYDROCHLORIDE 5 MG: 5 CAPSULE ORAL at 08:09

## 2023-07-13 RX ADMIN — APIXABAN 5 MG: 5 TABLET, FILM COATED ORAL at 21:44

## 2023-07-13 RX ADMIN — INSULIN ASPART 9 UNITS: 100 INJECTION, SOLUTION INTRAVENOUS; SUBCUTANEOUS at 08:10

## 2023-07-13 RX ADMIN — Medication 5 MG: at 21:44

## 2023-07-13 RX ADMIN — CHLORDIAZEPOXIDE HYDROCHLORIDE 5 MG: 5 CAPSULE ORAL at 21:44

## 2023-07-13 RX ADMIN — METOPROLOL SUCCINATE 50 MG: 50 TABLET, EXTENDED RELEASE ORAL at 08:09

## 2023-07-13 RX ADMIN — ISOSORBIDE MONONITRATE 60 MG: 60 TABLET, EXTENDED RELEASE ORAL at 08:09

## 2023-07-13 RX ADMIN — METHYLPREDNISOLONE SODIUM SUCCINATE 40 MG: 40 INJECTION, POWDER, FOR SOLUTION INTRAMUSCULAR; INTRAVENOUS at 03:13

## 2023-07-13 RX ADMIN — PANTOPRAZOLE SODIUM 40 MG: 40 TABLET, DELAYED RELEASE ORAL at 05:11

## 2023-07-13 RX ADMIN — INSULIN ASPART 11 UNITS: 100 INJECTION, SOLUTION INTRAVENOUS; SUBCUTANEOUS at 12:24

## 2023-07-13 RX ADMIN — DOXYCYCLINE 100 MG: 100 INJECTION, POWDER, LYOPHILIZED, FOR SOLUTION INTRAVENOUS at 16:08

## 2023-07-13 NOTE — PLAN OF CARE
Problem: Adult Inpatient Plan of Care  Goal: Plan of Care Review  Outcome: Ongoing, Progressing  Goal: Patient-Specific Goal (Individualized)  Outcome: Ongoing, Progressing  Goal: Absence of Hospital-Acquired Illness or Injury  Outcome: Ongoing, Progressing  Intervention: Identify and Manage Fall Risk  Recent Flowsheet Documentation  Taken 7/12/2023 2200 by Vita Camara RN  Safety Promotion/Fall Prevention: safety round/check completed  Taken 7/12/2023 2000 by Vita aCmara RN  Safety Promotion/Fall Prevention: safety round/check completed  Intervention: Prevent Skin Injury  Recent Flowsheet Documentation  Taken 7/12/2023 2300 by Vita Camara RN  Body Position:   turned   left  Taken 7/12/2023 2200 by Vita Camara RN  Body Position: right  Taken 7/12/2023 2000 by Vita Camara RN  Body Position: supine, legs elevated  Skin Protection: incontinence pads utilized  Intervention: Prevent and Manage VTE (Venous Thromboembolism) Risk  Recent Flowsheet Documentation  Taken 7/12/2023 2200 by Vita Camara RN  Activity Management: bedrest  Taken 7/12/2023 2000 by Vita Camara RN  Activity Management: bedrest  Intervention: Prevent Infection  Recent Flowsheet Documentation  Taken 7/12/2023 2200 by Vita Camara RN  Infection Prevention: environmental surveillance performed  Taken 7/12/2023 2000 by Vita Camara RN  Infection Prevention: environmental surveillance performed  Goal: Optimal Comfort and Wellbeing  Outcome: Ongoing, Progressing  Goal: Readiness for Transition of Care  Outcome: Ongoing, Progressing     Problem: Fall Injury Risk  Goal: Absence of Fall and Fall-Related Injury  Outcome: Ongoing, Progressing  Intervention: Promote Injury-Free Environment  Recent Flowsheet Documentation  Taken 7/12/2023 2200 by Vita Camara RN  Safety Promotion/Fall Prevention: safety round/check completed  Taken 7/12/2023 2000 by Vita Camara RN  Safety Promotion/Fall Prevention: safety round/check  completed     Problem: Noninvasive Ventilation Acute  Goal: Effective Unassisted Ventilation and Oxygenation  Outcome: Ongoing, Progressing     Problem: Skin Injury Risk Increased  Goal: Skin Health and Integrity  Outcome: Ongoing, Progressing  Intervention: Optimize Skin Protection  Recent Flowsheet Documentation  Taken 7/12/2023 2300 by Vita Camara RN  Head of Bed ([facility]) Positioning: HOB elevated  Taken 7/12/2023 2200 by Vita Camara RN  Head of Bed ([facility]) Positioning: HOB elevated  Taken 7/12/2023 2000 by Vita Camara RN  Head of Bed ([facility]) Positioning: HOB elevated  Skin Protection: incontinence pads utilized     Problem: Diabetes Comorbidity  Goal: Blood Glucose Level Within Targeted Range  Outcome: Ongoing, Progressing     Problem: Heart Failure Comorbidity  Goal: Maintenance of Heart Failure Symptom Control  Outcome: Ongoing, Progressing     Problem: Fluid Volume Excess  Goal: Fluid Balance  Outcome: Ongoing, Progressing  Intervention: Monitor and Manage Hypervolemia  Recent Flowsheet Documentation  Taken 7/12/2023 2000 by Vita Camara RN  Skin Protection: incontinence pads utilized     Problem: Dysrhythmia (Heart Failure)  Goal: Stable Heart Rate and Rhythm  Outcome: Ongoing, Progressing     Problem: Fluid Imbalance (Heart Failure)  Goal: Fluid Balance  Outcome: Ongoing, Progressing     Problem: Functional Ability Impaired (Heart Failure)  Goal: Optimal Functional Ability  Outcome: Ongoing, Progressing  Intervention: Optimize Functional Ability  Recent Flowsheet Documentation  Taken 7/12/2023 2200 by Vita Camara RN  Activity Management: bedrest  Taken 7/12/2023 2000 by Vita Camara RN  Activity Management: bedrest     Problem: Respiratory Compromise (Heart Failure)  Goal: Effective Oxygenation and Ventilation  Outcome: Ongoing, Progressing  Intervention: Promote Airway Secretion Clearance  Recent Flowsheet Documentation  Taken 7/12/2023 2000 by Vita Camara RN  Cough And Deep Breathing:  done independently per patient     Problem: Sleep Disordered Breathing (Heart Failure)  Goal: Effective Breathing Pattern During Sleep  Outcome: Ongoing, Progressing   Goal Outcome Evaluation:

## 2023-07-13 NOTE — PROGRESS NOTES
[facility]    Name:  Breonna Gongora   Age:  87 y.o.  Sex:  female  :  1935  MRN:  [id number]   Visit Number:  [id number]  Admission Date:  2023  Date Of Service:  23  Primary Care Physician:  Provider, No Known     LOS: 0 days :    Chief Complaint:      Follow-up of shortness of breath.    Subjective:    Ms. Gongora was seen and examined this morning.  Patient states that she is feeling slightly better today but continues to have dry cough.  She states that her breathing is not yet back to baseline.  Denies any chest pain.  No fevers.    Hospital Course:    Ms. Gongora is an 87-year-old woman, nursing home resident at [place], with past medical history of COPD on 3 L baseline, heart failure preserved ejection fraction, hypertension, type 2 diabetes, atrial fibrillation, hypothyroidism, depression, history of stroke, hard of hearing, pulmonary hypertension was sent to the emergency room with concern for shortness of air and nonproductive cough for 3 days.  She recently been started on Zithromax at her nursing home.     ED summary: Afebrile, hypertensive as high as 190/89, otherwise vital signs stable on baseline oxygen 3 L.  Increased work of breath, appears diaphoretic.  EKG atrial fibrillation, no ST elevations or depressions.  ABG compensated; pH 7.43, PCO2 46, PO2 74, bicarb 31.  proBNP chronically elevated, currently over 12,000.  Sodium 130.  Blood glucose 248.  Gait normal.  White count 14./Flu negative.  CXR blunting left lower field chronic.  She was provided Tylenol, Lasix 80 mg IV, DuoNeb, Nitrostat for blood pressure and was subsequently admitted to the medical floor with telemetry.    Patient was continued on IV Lasix as well as doxycycline for CHF and pneumonia.  She was continued on Levemir and subcutaneous insulin protocol for hyperglycemia.    Review of Systems:     All systems were reviewed and negative except as mentioned in  subjective, assessment and plan.    Vital Signs:    Temp:  [97.6 °F (36.4 °C)-98.8 °F (37.1 °C)] 98 °F (36.7 °C)  Heart Rate:  [65-97] 69  Resp:  [16-20] 18  BP: (131-197)/(71-98) 152/71    Intake and output:    I/O last 3 completed shifts:  In: 580 [P.O.:480; IV Piggyback:100]  Out: 1300 [Urine:1300]  I/O this shift:  In: 240 [P.O.:240]  Out: -     Physical Examination:    General Appearance:  Alert and cooperative.    Head:  Atraumatic and normocephalic.   Eyes: Conjunctivae and sclerae normal, no icterus. No pallor.   Throat: No oral lesions, no thrush, oral mucosa moist.   Neck: Supple, trachea midline, no thyromegaly.   Lungs:   Breath sounds heard bilaterally equally.  No wheezing.  Occasional basal crackles. No Pleural rub or bronchial breathing.   Heart:  Irregular S1 and S2, no murmur, no gallop, no rub. No JVD.   Abdomen:   Normal bowel sounds, no masses, no organomegaly. Soft, nontender, obese, no rebound tenderness.   Extremities: Supple, no edema, no cyanosis, no clubbing.   Skin: No bleeding or rash.   Neurologic: Alert but drowsy. No facial asymmetry. Moves all four limbs but does have generalized weakness. No tremors.      Laboratory results:    Results from last 7 days   Lab Units 07/13/23  0530 07/12/23  0335 07/11/23  2308   SODIUM mmol/L 135* 132* 130*   POTASSIUM mmol/L 4.2 4.1 4.8   CHLORIDE mmol/L 95* 92* 92*   CO2 mmol/L 29.7* 27.5 26.7   BUN mg/dL 33* 22 20   CREATININE mg/dL 1.10* 0.86 0.82   CALCIUM mg/dL 8.9 9.3 9.0   BILIRUBIN mg/dL  --   --  0.3   ALK PHOS U/L  --   --  81   ALT (SGPT) U/L  --   --  6   AST (SGOT) U/L  --   --  12   GLUCOSE mg/dL 333* 261* 248*       Results from last 7 days   Lab Units 07/12/23  0335 07/11/23  2244   WBC 10*3/mm3 15.11* 14.92*   HEMOGLOBIN g/dL 11.7* 11.3*   HEMATOCRIT % 36.9 35.6   PLATELETS 10*3/mm3 231 245           Results from last 7 days   Lab Units 07/12/23  0533 07/12/23  0335   HSTROP T ng/L 23* 23*       Results from last 7 days   Lab Units  07/12/23  0345 07/12/23  0335   BLOODCX  No growth at 24 hours No growth at 24 hours     Recent Labs     07/12/23  0100   PHART 7.430   MYE5UME 46.9*   PO2ART 74.9*   MBW5DAT 31.1*   BASEEXCESS 5.8*        I have reviewed the patient's laboratory results.    Radiology results:    XR Chest 1 View    Result Date: 7/12/2023  PROCEDURE: XR CHEST 1 VIEW-  INDICATION:  dyspnea  FINDINGS:  A portable view of the chest was obtained.  Comparison is made to a prior exam dated 05/11/2023.   Heart size is stable. There is an abnormal contour to the inferior right hilum. Mass or lymphadenopathy is not excluded. Interstitial opacities have worsened in the interval. Additionally, there is worsening left basilar airspace disease. A small left pleural effusion is stable. No pneumothorax.      Impression: 1. Abnormal right hilar contour. Mass or lymphadenopathy not excluded. Consider CT chest with contrast. 2. Worsening interstitial opacities. Favor pulmonary edema. 3. Worsening left basilar opacity, atelectasis versus pneumonia.  This report was signed and finalized on 7/12/2023 9:00 AM by Jo Ann Montero MD.   I have reviewed the patient's radiology reports.    Medication Review:     I have reviewed the patient's active and prn medications.     Problem List:      Acute on chronic heart failure with preserved ejection fraction (HFpEF)    Acute on chronic respiratory failure with hypoxia    COPD with acute exacerbation    Type 2 diabetes mellitus with unspecified complications    Chronic atrial fibrillation    Pulmonary hypertension    Acute exacerbation of CHF (congestive heart failure)    Assessment:    Acute on chronic hypoxic respiratory failure, POA.  Acute on chronic diastolic heart failure, POA.  Acute COPD exacerbation, POA.  Hyponatremia, not clinically significant, POA.  Diabetes mellitus type 2 on insulin.  Chronic atrial fibrillation on apixaban.  Essential hypertension.  Moderate to severe pulmonary  hypertension.  Acquired hypothyroidism.    Plan:    Respiratory failure/diastolic heart failure.  - Continue nasal cannula oxygen with intermittent BiPAP therapy to maintain saturations above 90%.  - Continue IV Lasix 40 mg twice daily for today.  - We will continue lisinopril and Toprol-XL from home medication list.  - Patient recently had an echocardiogram on 5/12/2023 which showed hyperdynamic left ventricular ejection fraction at 66%, grade 3 diastolic dysfunction as well as moderate to severe pulmonary hypertension.    Acute COPD exacerbation.  - Continue nasal cannula oxygen.  - Continue bronchodilators, budesonide, IV Solu-Medrol and mucolytic agents.  - Continue antibiotics therapy with doxycycline.    Chronic atrial fibrillation.  - Continue Toprol-XL, apixaban.    Diabetes mellitus type 2.  - Continue Glucomander insulin protocol with basal and correction insulin.  - Hemoglobin A1c 8.9 on 7/12/2023.    Essential hypertension/hypothyroidism.  - Continue amlodipine, Imdur, lisinopril and Toprol-XL.  - Continue levothyroxine.  - We will obtain TSH levels.    Patient is mostly bedbound at the nursing home facility and no indication for physical therapy.  Discussed with nursing staff and multidisciplinary team.      DVT Prophylaxis: Apixaban  Code Status: DNR/DNI  Diet: Cardiac diabetic  Discharge Plan: Hopefully, back to SNF in 1-2 days.    Doug Vegas MD  07/13/23  09:34 EDT    Dictated utilizing Dragon dictation.

## 2023-07-13 NOTE — CASE MANAGEMENT/SOCIAL WORK
"Case Management/Social Work    Patient Name:  Breonna Gongora  YOB: 1935  MRN: [id number]  Admit Date:  7/11/2023      Sw met with pt at bedside to discuss discharge plan. Pt states she is doing well today and is ready to go \"home\", meaning [place].  States when she talks for a long period of time she begins to lose her voice. Denies any other needs or concerns at this time.       Electronically signed by:  Gilda Cline  07/13/23 12:33 EDT  "

## 2023-07-13 NOTE — THERAPY EVALUATION
Acute Care - Speech Language Pathology   Swallow Initial Evaluation  Chris     Patient Name: Breonna Gongora  : 1935  MRN: [id number]  Today's Date: 2023               Admit Date: 2023    Visit Dx:     ICD-10-CM ICD-9-CM   1. Acute on chronic congestive heart failure, unspecified heart failure type  I50.9 428.0   2. COPD exacerbation  J44.1 491.21     Patient Active Problem List   Diagnosis    Acute bronchospasm    Anxiety disorder    Candidiasis, unspecified    Constipation, unspecified    Chronic rhinitis    Duodenal ulcer, unsp as acute or chronic, w/o hemor or perf    Essential (primary) hypertension    Flatulence    GERD without esophagitis    Hypomagnesemia    Insomnia, unspecified    Major depressive disorder, single episode, mild    Other forms of angina pectoris    Other myocardial infarction type    Other specified hypothyroidism    Pain, unspecified    Type 2 diabetes mellitus with unspecified complications    Vitamin B12 deficiency anemia due to intrinsic factor deficiency    Vitamin D deficiency, unspecified    Chronic atrial fibrillation    Acute on chronic congestive heart failure, unspecified heart failure type    Acute on chronic heart failure with preserved ejection fraction (HFpEF)    COPD with acute exacerbation    Acute on chronic respiratory failure with hypoxia    Pulmonary hypertension    Acute exacerbation of CHF (congestive heart failure)     Past Medical History:   Diagnosis Date    Allergic     Anemia     CHF (congestive heart failure)     Depression     Hypothyroidism     Paroxysmal A-fib     Stroke      Past Surgical History:   Procedure Laterality Date    APPENDECTOMY      CATARACT EXTRACTION N/A     TOTAL ABDOMINAL HYSTERECTOMY WITH SALPINGO OOPHORECTOMY         SLP Recommendation and Plan  SLP Swallowing Diagnosis: mild, oral dysphagia, suspected pharyngeal dysphagia, esophageal dysphagia (23 0855)  SLP Diet Recommendation: mechanical ground textures, thin  liquids (07/13/23 0855)  Recommended Precautions and Strategies: upright posture during/after eating, general aspiration precautions, reflux precautions, other (see comments) (single sip-swallows, no consecutive swallows) (07/13/23 0855)  SLP Rec. for Method of Medication Administration: meds whole, with thin liquids, with puree, as tolerated (07/13/23 0855)     Monitor for Signs of Aspiration: notify SLP if any concerns (07/13/23 0855)     Swallow Criteria for Skilled Therapeutic Interventions Met: no problems identified which require skilled intervention (07/13/23 0855)  Anticipated Discharge Disposition (SLP): skilled nursing facility (07/13/23 0855)     Therapy Frequency (Swallow): evaluation only (07/13/23 0855)                                           Progress: improving  Outcome Evaluation: Bedside eval of swallow completed with pt. seated upright in bed for po trials. She voiced c/o food sticking at times and having solid meats modified at the nursing home prior to admit for ease of swallowing. She was given trials of regular solids, puree, and thin liquid. Oral mech was remarkable only for generalized weakness. She demonstrated mild oral phase dysphagia with extended bolus prep time with regular solids due to generalized weakness. Suspect pharyngeal phase dysphagia affecting safety with swallowing liquids with consecutive swallow due to delayed initiation of pharyngeal swallow per palpation, but no overt s/s aspiration with single sip-swallows over multiple trials with thin liquid from straw. Pt. has a dx of GERD as well. Recommend: 1. mechanical soft diet with thin liq as holli, 2. meds whole as holli, 3. aspiration precautions, 4. reflux precautions, 5. single sip-swallows, no consecutive swallows. D/W pt. and RN following eval.      SWALLOW EVALUATION (last 72 hours)       SLP Adult Swallow Evaluation       Row Name 07/13/23 0855                   Rehab Evaluation    Document Type evaluation  -TM         Subjective Information complains of  food sticking at times  -TM        Patient Observations alert;cooperative  -TM        Patient/Family/Caregiver Comments/Observations no family present  -TM        Patient Effort good  -TM           General Information    Patient Profile Reviewed yes  -TM        Pertinent History Of Current Problem DM, cardiac, GERD, COPD, hx CVA, [place]  -TM        Current Method of Nutrition regular textures;thin liquids  -TM        Precautions/Limitations, Vision WFL  -TM        Precautions/Limitations, Hearing hearing impairment, bilaterally  -TM        Prior Level of Function-Communication WFL  -TM        Prior Level of Function-Swallowing esophageal concerns;other (see comments)  dx of GERD  -TM        Plans/Goals Discussed with patient;other (see comments)  RN  -TM        Barriers to Rehab none identified  -TM        Patient's Goals for Discharge patient did not state  -TM           Pain    Additional Documentation Pain Scale: Numbers Pre/Post-Treatment (Group)  -TM           Pain Scale: Numbers Pre/Post-Treatment    Pretreatment Pain Rating 0/10 - no pain  -TM        Posttreatment Pain Rating 0/10 - no pain  -TM           Oral Motor Structure and Function    Oral Lesions or Structural Abnormalities and/or variants none identified  -TM        Dentition Assessment natural, present and adequate;upper dentures/partial in place  -TM        Secretion Management WNL/WFL  -TM        Mucosal Quality moist, healthy  -TM        Volitional Cough WFL  -TM           Oral Musculature and Cranial Nerve Assessment    Oral Motor General Assessment generalized oral motor weakness  -TM           General Eating/Swallowing Observations    Respiratory Support Currently in Use nasal cannula  -TM        O2 Liters 3L  -TM        Eating/Swallowing Skills fed by SLP  -TM        Positioning During Eating upright in bed  -TM        Utensils Used spoon;straw  -TM        Consistencies Trialed regular textures;pureed;thin  liquids  -TM        Pre SpO2 (%) 99  -TM        Post SpO2 (%) 96  -TM           Respiratory    Respiratory Status WFL;during swallowing/eating  -TM           Clinical Swallow Eval    Oral Prep Phase impaired  -TM        Oral Transit WFL  -TM        Oral Residue WFL  -TM        Pharyngeal Phase suspected pharyngeal impairment  -TM        Esophageal Phase suspected esophageal impairment  -TM        Clinical Swallow Evaluation Summary Bedside eval of swallow completed with pt. seated upright in bed for po trials.  She voiced c/o food sticking at times and having solid meats modified at the nursing home prior to admit for ease of swallowing.  She was given trials of regular solids, puree, and thin liquid.  Oral mech was remarkable only for generalized weakness.  She demonstrated mild oral phase dysphagia with extended bolus prep time with regular solids due to generalized weakness.  Suspect pharyngeal phase dysphagia affecting safety with swallowing liquids with consecutive swallow due to delayed initiation of pharyngeal swallow per palpation, but no overt s/s aspiration with single sip-swallows over multiple trials with thin liquid from straw.  Pt. has a dx of GERD as well.  Recommend:  1. mechanical soft diet with thin liq as holli, 2. meds whole as holli, 3. aspiration precautions, 4. reflux precautions, 5. single sip-swallows, no consecutive swallows.  D/W pt. and RN following eval.  -TM           Oral Prep Concerns    Oral Prep Concerns increased prep time  -TM        Increased Prep Time regular consistencies  -TM           Pharyngeal Phase Concerns    Pharyngeal Phase Concerns other (see comments)  delayed swallow initiation per palpation  -TM           Esophageal Phase Concerns    Esophageal Phase Concerns sensation of material sticking;other (see comments)  dx of GERD  -TM           SLP Evaluation Clinical Impression    SLP Swallowing Diagnosis mild;oral dysphagia;suspected pharyngeal dysphagia;esophageal dysphagia   -TM        Functional Impact risk of aspiration/pneumonia  -        Swallow Criteria for Skilled Therapeutic Interventions Met no problems identified which require skilled intervention  -           Recommendations    Therapy Frequency (Swallow) evaluation only  -        SLP Diet Recommendation mechanical ground textures;thin liquids  -TM        Recommended Precautions and Strategies upright posture during/after eating;general aspiration precautions;reflux precautions;other (see comments)  single sip-swallows, no consecutive swallows  -        Oral Care Recommendations Denture Care;Toothbrush  -        SLP Rec. for Method of Medication Administration meds whole;with thin liquids;with puree;as tolerated  -        Monitor for Signs of Aspiration notify SLP if any concerns  -        Anticipated Discharge Disposition (SLP) [facility]  -                  User Key  (r) = Recorded By, (t) = Taken By, (c) = Cosigned By      Initials Name Effective Dates     Lorin Savage 06/16/21 -                     EDUCATION  The patient has been educated in the following areas:   Dysphagia (Swallowing Impairment) Oral Care/Hydration Modified Diet Instruction.              Time Calculation:    Time Calculation- SLP       Row Name 07/13/23 1013             Time Calculation- SLP    SLP Start Time 0855  -      SLP Received On 07/13/23  -         Untimed Charges    SLP Eval/Re-eval  ST Eval Oral Pharyng Swallow - 19532  -                User Key  (r) = Recorded By, (t) = Taken By, (c) = Cosigned By      Initials Name Provider Type     Lorin Savage Speech and Language Pathologist                    Therapy Charges for Today       Code Description Service Date Service Provider Modifiers Qty    [id number] HC ST EVAL ORAL PHARYNG SWALLOW 4 7/13/2023 Lorin Savage GN 1                 Lorin Savage  7/13/2023

## 2023-07-13 NOTE — PLAN OF CARE
Problem: Adult Inpatient Plan of Care  Goal: Plan of Care Review  Outcome: Ongoing, Progressing  Flowsheets (Taken 7/13/2023 4908)  Progress: no change  Plan of Care Reviewed With: patient   Goal Outcome Evaluation:  Plan of Care Reviewed With: patient        Progress: no change          Patient received from HEBERT Melendez. VSS. Patient rested comfortably. Will continue to monitor.

## 2023-07-13 NOTE — PLAN OF CARE
Goal Outcome Evaluation:           Progress: improving  Outcome Evaluation: Bedside eval of swallow completed with pt. seated upright in bed for po trials. She voiced c/o food sticking at times and having solid meats modified at the nursing home prior to admit for ease of swallowing. She was given trials of regular solids, puree, and thin liquid. Oral mech was remarkable only for generalized weakness. She demonstrated mild oral phase dysphagia with extended bolus prep time with regular solids due to generalized weakness. Suspect pharyngeal phase dysphagia affecting safety with swallowing liquids with consecutive swallow due to delayed initiation of pharyngeal swallow per palpation, but no overt s/s aspiration with single sip-swallows over multiple trials with thin liquid from straw. Pt. has a dx of GERD as well. Recommend: 1. mechanical soft diet with thin liq as holli, 2. meds whole as holli, 3. aspiration precautions, 4. reflux precautions, 5. single sip-swallows, no consecutive swallows. D/W pt. and RN following eval.

## 2023-07-14 ENCOUNTER — APPOINTMENT (OUTPATIENT)
Dept: GENERAL RADIOLOGY | Facility: HOSPITAL | Age: 88
DRG: 291 | End: 2023-07-14
Payer: MEDICARE

## 2023-07-14 LAB
ANION GAP SERPL CALCULATED.3IONS-SCNC: 12.1 MMOL/L (ref 5–15)
BUN SERPL-MCNC: 37 MG/DL (ref 8–23)
BUN/CREAT SERPL: 45.7 (ref 7–25)
CALCIUM SPEC-SCNC: 8.9 MG/DL (ref 8.6–10.5)
CHLORIDE SERPL-SCNC: 96 MMOL/L (ref 98–107)
CO2 SERPL-SCNC: 28.9 MMOL/L (ref 22–29)
CREAT SERPL-MCNC: 0.81 MG/DL (ref 0.57–1)
DEPRECATED RDW RBC AUTO: 46 FL (ref 37–54)
EGFRCR SERPLBLD CKD-EPI 2021: 70.4 ML/MIN/1.73
ERYTHROCYTE [DISTWIDTH] IN BLOOD BY AUTOMATED COUNT: 14.1 % (ref 12.3–15.4)
GLUCOSE BLDC GLUCOMTR-MCNC: 207 MG/DL (ref 70–130)
GLUCOSE BLDC GLUCOMTR-MCNC: 208 MG/DL (ref 70–130)
GLUCOSE BLDC GLUCOMTR-MCNC: 245 MG/DL (ref 70–130)
GLUCOSE BLDC GLUCOMTR-MCNC: 266 MG/DL (ref 70–130)
GLUCOSE SERPL-MCNC: 230 MG/DL (ref 65–99)
HCT VFR BLD AUTO: 33.7 % (ref 34–46.6)
HGB BLD-MCNC: 10.6 G/DL (ref 12–15.9)
MAGNESIUM SERPL-MCNC: 1.8 MG/DL (ref 1.6–2.4)
MCH RBC QN AUTO: 28.1 PG (ref 26.6–33)
MCHC RBC AUTO-ENTMCNC: 31.5 G/DL (ref 31.5–35.7)
MCV RBC AUTO: 89.4 FL (ref 79–97)
PLATELET # BLD AUTO: 234 10*3/MM3 (ref 140–450)
PMV BLD AUTO: 11.8 FL (ref 6–12)
POTASSIUM SERPL-SCNC: 3.9 MMOL/L (ref 3.5–5.2)
RBC # BLD AUTO: 3.77 10*6/MM3 (ref 3.77–5.28)
SARS-COV-2 RNA RESP QL NAA+PROBE: NOT DETECTED
SODIUM SERPL-SCNC: 137 MMOL/L (ref 136–145)
VRE SPEC QL CULT: NORMAL
WBC NRBC COR # BLD: 12.36 10*3/MM3 (ref 3.4–10.8)

## 2023-07-14 PROCEDURE — 63710000001 INSULIN DETEMIR PER 5 UNITS: Performed by: INTERNAL MEDICINE

## 2023-07-14 PROCEDURE — 85027 COMPLETE CBC AUTOMATED: CPT | Performed by: INTERNAL MEDICINE

## 2023-07-14 PROCEDURE — 63710000001 INSULIN ASPART PER 5 UNITS: Performed by: INTERNAL MEDICINE

## 2023-07-14 PROCEDURE — 83735 ASSAY OF MAGNESIUM: CPT | Performed by: INTERNAL MEDICINE

## 2023-07-14 PROCEDURE — 80048 BASIC METABOLIC PNL TOTAL CA: CPT | Performed by: INTERNAL MEDICINE

## 2023-07-14 PROCEDURE — 71045 X-RAY EXAM CHEST 1 VIEW: CPT

## 2023-07-14 PROCEDURE — 94799 UNLISTED PULMONARY SVC/PX: CPT

## 2023-07-14 PROCEDURE — 82948 REAGENT STRIP/BLOOD GLUCOSE: CPT

## 2023-07-14 PROCEDURE — 25010000002 FUROSEMIDE PER 20 MG: Performed by: INTERNAL MEDICINE

## 2023-07-14 PROCEDURE — 99232 SBSQ HOSP IP/OBS MODERATE 35: CPT | Performed by: INTERNAL MEDICINE

## 2023-07-14 PROCEDURE — 94660 CPAP INITIATION&MGMT: CPT

## 2023-07-14 PROCEDURE — 25010000002 METHYLPREDNISOLONE PER 40 MG: Performed by: STUDENT IN AN ORGANIZED HEALTH CARE EDUCATION/TRAINING PROGRAM

## 2023-07-14 PROCEDURE — 94761 N-INVAS EAR/PLS OXIMETRY MLT: CPT

## 2023-07-14 PROCEDURE — 87635 SARS-COV-2 COVID-19 AMP PRB: CPT | Performed by: INTERNAL MEDICINE

## 2023-07-14 PROCEDURE — 25010000002 LORAZEPAM PER 2 MG: Performed by: STUDENT IN AN ORGANIZED HEALTH CARE EDUCATION/TRAINING PROGRAM

## 2023-07-14 PROCEDURE — 25010000002 METHYLPREDNISOLONE PER 125 MG: Performed by: INTERNAL MEDICINE

## 2023-07-14 RX ORDER — FUROSEMIDE 40 MG/1
40 TABLET ORAL DAILY
Status: DISCONTINUED | OUTPATIENT
Start: 2023-07-15 | End: 2023-07-15 | Stop reason: HOSPADM

## 2023-07-14 RX ORDER — LORAZEPAM 2 MG/ML
0.5 INJECTION INTRAMUSCULAR ONCE
Status: COMPLETED | OUTPATIENT
Start: 2023-07-14 | End: 2023-07-14

## 2023-07-14 RX ORDER — FUROSEMIDE 10 MG/ML
40 INJECTION INTRAMUSCULAR; INTRAVENOUS ONCE
Status: COMPLETED | OUTPATIENT
Start: 2023-07-14 | End: 2023-07-14

## 2023-07-14 RX ORDER — METHYLPREDNISOLONE SODIUM SUCCINATE 125 MG/2ML
60 INJECTION, POWDER, LYOPHILIZED, FOR SOLUTION INTRAMUSCULAR; INTRAVENOUS EVERY 8 HOURS
Status: DISCONTINUED | OUTPATIENT
Start: 2023-07-14 | End: 2023-07-15 | Stop reason: HOSPADM

## 2023-07-14 RX ADMIN — Medication 10 ML: at 08:28

## 2023-07-14 RX ADMIN — INSULIN ASPART 5 UNITS: 100 INJECTION, SOLUTION INTRAVENOUS; SUBCUTANEOUS at 12:22

## 2023-07-14 RX ADMIN — PANTOPRAZOLE SODIUM 40 MG: 40 TABLET, DELAYED RELEASE ORAL at 06:07

## 2023-07-14 RX ADMIN — BUDESONIDE 0.5 MG: 0.5 INHALANT RESPIRATORY (INHALATION) at 19:37

## 2023-07-14 RX ADMIN — BUDESONIDE 0.5 MG: 0.5 INHALANT RESPIRATORY (INHALATION) at 06:59

## 2023-07-14 RX ADMIN — FUROSEMIDE 40 MG: 10 INJECTION, SOLUTION INTRAMUSCULAR; INTRAVENOUS at 09:56

## 2023-07-14 RX ADMIN — HYDROCODONE BITARTRATE AND ACETAMINOPHEN 1 TABLET: 5; 325 TABLET ORAL at 21:18

## 2023-07-14 RX ADMIN — INSULIN DETEMIR 28 UNITS: 100 INJECTION, SOLUTION SUBCUTANEOUS at 20:32

## 2023-07-14 RX ADMIN — METHYLPREDNISOLONE SODIUM SUCCINATE 60 MG: 125 INJECTION, POWDER, FOR SOLUTION INTRAMUSCULAR; INTRAVENOUS at 18:45

## 2023-07-14 RX ADMIN — INSULIN ASPART 5 UNITS: 100 INJECTION, SOLUTION INTRAVENOUS; SUBCUTANEOUS at 17:05

## 2023-07-14 RX ADMIN — LORAZEPAM 0.5 MG: 2 INJECTION INTRAMUSCULAR; INTRAVENOUS at 22:33

## 2023-07-14 RX ADMIN — METOPROLOL SUCCINATE 50 MG: 50 TABLET, EXTENDED RELEASE ORAL at 08:26

## 2023-07-14 RX ADMIN — BENZONATATE 100 MG: 100 CAPSULE ORAL at 01:56

## 2023-07-14 RX ADMIN — SENNOSIDES AND DOCUSATE SODIUM 2 TABLET: 50; 8.6 TABLET ORAL at 08:26

## 2023-07-14 RX ADMIN — APIXABAN 5 MG: 5 TABLET, FILM COATED ORAL at 20:32

## 2023-07-14 RX ADMIN — IPRATROPIUM BROMIDE AND ALBUTEROL SULFATE 3 ML: .5; 3 SOLUTION RESPIRATORY (INHALATION) at 12:55

## 2023-07-14 RX ADMIN — DOXYCYCLINE 100 MG: 100 INJECTION, POWDER, LYOPHILIZED, FOR SOLUTION INTRAVENOUS at 06:07

## 2023-07-14 RX ADMIN — APIXABAN 5 MG: 5 TABLET, FILM COATED ORAL at 08:26

## 2023-07-14 RX ADMIN — Medication 10 ML: at 20:33

## 2023-07-14 RX ADMIN — METHYLPREDNISOLONE SODIUM SUCCINATE 40 MG: 40 INJECTION, POWDER, FOR SOLUTION INTRAMUSCULAR; INTRAVENOUS at 03:07

## 2023-07-14 RX ADMIN — IPRATROPIUM BROMIDE AND ALBUTEROL SULFATE 3 ML: .5; 3 SOLUTION RESPIRATORY (INHALATION) at 06:59

## 2023-07-14 RX ADMIN — LISINOPRIL 20 MG: 20 TABLET ORAL at 08:27

## 2023-07-14 RX ADMIN — Medication 5 MG: at 20:32

## 2023-07-14 RX ADMIN — ACETAMINOPHEN 650 MG: 325 TABLET, FILM COATED ORAL at 08:26

## 2023-07-14 RX ADMIN — IPRATROPIUM BROMIDE AND ALBUTEROL SULFATE 3 ML: .5; 3 SOLUTION RESPIRATORY (INHALATION) at 19:37

## 2023-07-14 RX ADMIN — CHLORDIAZEPOXIDE HYDROCHLORIDE 5 MG: 5 CAPSULE ORAL at 09:09

## 2023-07-14 RX ADMIN — LEVOTHYROXINE SODIUM 75 MCG: 75 TABLET ORAL at 08:27

## 2023-07-14 RX ADMIN — INSULIN ASPART 4 UNITS: 100 INJECTION, SOLUTION INTRAVENOUS; SUBCUTANEOUS at 08:28

## 2023-07-14 RX ADMIN — INSULIN ASPART 4 UNITS: 100 INJECTION, SOLUTION INTRAVENOUS; SUBCUTANEOUS at 20:33

## 2023-07-14 RX ADMIN — CHLORDIAZEPOXIDE HYDROCHLORIDE 5 MG: 5 CAPSULE ORAL at 20:32

## 2023-07-14 RX ADMIN — METHYLPREDNISOLONE SODIUM SUCCINATE 60 MG: 125 INJECTION, POWDER, FOR SOLUTION INTRAMUSCULAR; INTRAVENOUS at 10:01

## 2023-07-14 RX ADMIN — ISOSORBIDE MONONITRATE 60 MG: 60 TABLET, EXTENDED RELEASE ORAL at 08:27

## 2023-07-14 RX ADMIN — MIRTAZAPINE 15 MG: 15 TABLET, ORALLY DISINTEGRATING ORAL at 20:32

## 2023-07-14 RX ADMIN — ACETAMINOPHEN 650 MG: 325 TABLET, FILM COATED ORAL at 20:32

## 2023-07-14 RX ADMIN — FLUTICASONE PROPIONATE 1 SPRAY: 50 SPRAY, METERED NASAL at 08:44

## 2023-07-14 RX ADMIN — SENNOSIDES AND DOCUSATE SODIUM 2 TABLET: 50; 8.6 TABLET ORAL at 20:34

## 2023-07-14 RX ADMIN — AMLODIPINE BESYLATE 10 MG: 5 TABLET ORAL at 08:27

## 2023-07-14 NOTE — PLAN OF CARE
Problem: Adult Inpatient Plan of Care  Goal: Plan of Care Review  Outcome: Ongoing, Progressing  Goal: Patient-Specific Goal (Individualized)  Outcome: Ongoing, Progressing  Goal: Absence of Hospital-Acquired Illness or Injury  Outcome: Ongoing, Progressing  Intervention: Identify and Manage Fall Risk  Recent Flowsheet Documentation  Taken 7/14/2023 1400 by Elicia Hatfield RN  Safety Promotion/Fall Prevention:   room organization consistent   safety round/check completed   toileting scheduled  Taken 7/14/2023 1200 by Elicia Hatfield RN  Safety Promotion/Fall Prevention:   room organization consistent   safety round/check completed   toileting scheduled  Taken 7/14/2023 1000 by Elicia Hatfield RN  Safety Promotion/Fall Prevention:   room organization consistent   safety round/check completed  Taken 7/14/2023 0800 by Elicia Hatfield RN  Safety Promotion/Fall Prevention:   room organization consistent   safety round/check completed   toileting scheduled  Intervention: Prevent Skin Injury  Recent Flowsheet Documentation  Taken 7/14/2023 1600 by Elicia Hatfield RN  Body Position:   turned   supine  Taken 7/14/2023 1400 by Elicia Hatfield RN  Body Position:   turned   right   tilted  Taken 7/14/2023 1200 by Elicia Hatfield RN  Body Position:   turned   right   side-lying  Taken 7/14/2023 1000 by Elicia Hatfield RN  Body Position:   turned   right   tilted   weight shifting  Taken 7/14/2023 0800 by Elicia Hatfield RN  Body Position:   turned   supine  Skin Protection:   adhesive use limited   protective footwear used  Intervention: Prevent and Manage VTE (Venous Thromboembolism) Risk  Recent Flowsheet Documentation  Taken 7/14/2023 0800 by Elicia Hatfield RN  Activity Management: bedrest  Intervention: Prevent Infection  Recent Flowsheet Documentation  Taken 7/14/2023 1600 by Elicia Hatfield RN  Infection Prevention: hand hygiene promoted  Goal: Optimal Comfort and Wellbeing  Outcome: Ongoing, Progressing  Intervention: Monitor  Pain and Promote Comfort  Recent Flowsheet Documentation  Taken 7/14/2023 0800 by Elicia Hatfield RN  Pain Management Interventions:   position adjusted   see MAR  Goal: Readiness for Transition of Care  Outcome: Ongoing, Progressing     Problem: Fall Injury Risk  Goal: Absence of Fall and Fall-Related Injury  Outcome: Ongoing, Progressing  Intervention: Identify and Manage Contributors  Recent Flowsheet Documentation  Taken 7/14/2023 1000 by Elicia Hatfield RN  Medication Review/Management: medications reviewed  Taken 7/14/2023 0800 by Elicia Hatfield RN  Medication Review/Management: medications reviewed  Intervention: Promote Injury-Free Environment  Recent Flowsheet Documentation  Taken 7/14/2023 1400 by Elicia Hatfield RN  Safety Promotion/Fall Prevention:   room organization consistent   safety round/check completed   toileting scheduled  Taken 7/14/2023 1200 by Elicia Hatfield RN  Safety Promotion/Fall Prevention:   room organization consistent   safety round/check completed   toileting scheduled  Taken 7/14/2023 1000 by Elicia Hatfield RN  Safety Promotion/Fall Prevention:   room organization consistent   safety round/check completed  Taken 7/14/2023 0800 by Elicia Hatfield RN  Safety Promotion/Fall Prevention:   room organization consistent   safety round/check completed   toileting scheduled     Problem: Noninvasive Ventilation Acute  Goal: Effective Unassisted Ventilation and Oxygenation  Outcome: Ongoing, Progressing     Problem: Skin Injury Risk Increased  Goal: Skin Health and Integrity  Outcome: Ongoing, Progressing  Intervention: Optimize Skin Protection  Recent Flowsheet Documentation  Taken 7/14/2023 1600 by Elicia Hatfield RN  Head of Bed (HOB) Positioning: HOB lowered  Taken 7/14/2023 1000 by Elicia Hatfield RN  Head of Bed (HOB) Positioning: HOB lowered  Taken 7/14/2023 0800 by Elicia Hatfield RN  Pressure Reduction Techniques:   heels elevated off bed   frequent weight shift encouraged  Head of Bed (HOB)  Positioning: HOB elevated  Skin Protection:   adhesive use limited   protective footwear used     Problem: Diabetes Comorbidity  Goal: Blood Glucose Level Within Targeted Range  Outcome: Ongoing, Progressing     Problem: Heart Failure Comorbidity  Goal: Maintenance of Heart Failure Symptom Control  Outcome: Ongoing, Progressing  Intervention: Maintain Heart Failure-Management  Recent Flowsheet Documentation  Taken 7/14/2023 1000 by Elicia Hatfield RN  Medication Review/Management: medications reviewed  Taken 7/14/2023 0800 by Elicia Hatfield RN  Medication Review/Management: medications reviewed     Problem: Fluid Volume Excess  Goal: Fluid Balance  Outcome: Ongoing, Progressing  Intervention: Monitor and Manage Hypervolemia  Recent Flowsheet Documentation  Taken 7/14/2023 0800 by Elicia Hatfield RN  Skin Protection:   adhesive use limited   protective footwear used     Problem: Dysrhythmia (Heart Failure)  Goal: Stable Heart Rate and Rhythm  Outcome: Ongoing, Progressing  Goal: Stable Heart Rate and Rhythm  Outcome: Ongoing, Progressing     Problem: Fluid Imbalance (Heart Failure)  Goal: Fluid Balance  Outcome: Ongoing, Progressing  Goal: Fluid Balance  Outcome: Ongoing, Progressing     Problem: Functional Ability Impaired (Heart Failure)  Goal: Optimal Functional Ability  Outcome: Ongoing, Progressing  Intervention: Optimize Functional Ability  Recent Flowsheet Documentation  Taken 7/14/2023 0800 by Elicia Hatfield RN  Activity Management: bedrest  Goal: Optimal Functional Ability  Outcome: Ongoing, Progressing  Intervention: Optimize Functional Ability  Recent Flowsheet Documentation  Taken 7/14/2023 0800 by Elicia Hatfield RN  Activity Management: bedrest     Problem: Respiratory Compromise (Heart Failure)  Goal: Effective Oxygenation and Ventilation  Outcome: Ongoing, Progressing  Intervention: Promote Airway Secretion Clearance  Recent Flowsheet Documentation  Taken 7/14/2023 0800 by Elicia Hatfield RN  Cough And  Deep Breathing: done independently per patient  Goal: Effective Oxygenation and Ventilation  Outcome: Ongoing, Progressing  Intervention: Promote Airway Secretion Clearance  Recent Flowsheet Documentation  Taken 7/14/2023 0800 by Elicia Hatfield RN  Cough And Deep Breathing: done independently per patient     Problem: Sleep Disordered Breathing (Heart Failure)  Goal: Effective Breathing Pattern During Sleep  Outcome: Ongoing, Progressing  Goal: Effective Breathing Pattern During Sleep  Outcome: Ongoing, Progressing     Problem: Adjustment to Illness (Heart Failure)  Goal: Optimal Coping  Outcome: Ongoing, Progressing     Problem: Cardiac Output Decreased (Heart Failure)  Goal: Optimal Cardiac Output  Outcome: Ongoing, Progressing     Problem: Oral Intake Inadequate (Heart Failure)  Goal: Optimal Nutrition Intake  Outcome: Ongoing, Progressing     Problem: Gas Exchange Impaired  Goal: Optimal Gas Exchange  Outcome: Ongoing, Progressing  Intervention: Optimize Oxygenation and Ventilation  Recent Flowsheet Documentation  Taken 7/14/2023 1600 by Elicia Hatfield RN  Head of Bed (HOB) Positioning: HOB lowered  Taken 7/14/2023 1000 by Elicia Hatfield RN  Head of Bed (HOB) Positioning: HOB lowered  Taken 7/14/2023 0800 by Elicia Hatfield RN  Head of Bed (HOB) Positioning: HOB elevated     Problem: Dysrhythmia  Goal: Normalized Cardiac Rhythm  Outcome: Ongoing, Progressing   Goal Outcome Evaluation:         Patient states breathing has improved since meds this AM, oxygen currently on 2 liters nasal cannula, no distress noted.VSS.Possible discharge in AM back to [place]

## 2023-07-14 NOTE — PROGRESS NOTES
[facility]    Name:  Breonna Gongora   Age:  87 y.o.  Sex:  female  :  1935  MRN:  [id number]   Visit Number:  [id number]  Admission Date:  2023  Date Of Service:  23  Primary Care Physician:  Provider, No Known     LOS: 1 day :    Chief Complaint:      Shortness of breath and cough.    Subjective:    Ms. Gongora was seen and examined this morning.  She states that she is feeling slightly worse today with cough throughout the night.  She did not sleep well and was not able to bring up any phlegm.  She has been on IV Lasix until yesterday.  She is sounding more wheezy today but no fevers overnight.  Denies any chest pain.  She was seen by speech therapy yesterday and they recommended mechanical soft diet with thin liquids as tolerated.      Hospital Course:    Ms. Gongora is an 87-year-old woman, nursing home resident at [place], with past medical history of COPD on 3 L baseline, heart failure preserved ejection fraction, hypertension, type 2 diabetes, atrial fibrillation, hypothyroidism, depression, history of stroke, hard of hearing, pulmonary hypertension was sent to the emergency room with concern for shortness of air and nonproductive cough for 3 days.  She recently been started on Zithromax at her nursing home.     ED summary: Afebrile, hypertensive as high as 190/89, otherwise vital signs stable on baseline oxygen 3 L.  Increased work of breath, appears diaphoretic.  EKG atrial fibrillation, no ST elevations or depressions.  ABG compensated; pH 7.43, PCO2 46, PO2 74, bicarb 31.  proBNP chronically elevated, currently over 12,000.  Sodium 130.  Blood glucose 248.  Gait normal.  White count 14./Flu negative.  CXR blunting left lower field chronic.  She was provided Tylenol, Lasix 80 mg IV, DuoNeb, Nitrostat for blood pressure and was subsequently admitted to the medical floor with telemetry.    Patient was continued on IV Lasix as well as  doxycycline for CHF and pneumonia.  She was continued on Levemir and subcutaneous insulin protocol for hyperglycemia.  Patient was seen by speech therapy who recommended mechanical soft diet with thin liquids as tolerated.  Repeat chest x-ray after 2 days of IV Lasix showed similar findings without any worsening.  Due to persistent wheezing, she was placed on IV Solu-Medrol as well as budesonide nebulizations.  She was continued on doxycycline.    Patient has multiple comorbidities as well as severe pulmonary hypertension.  She is chronically bedbound and her prognosis is extremely poor.  It is uncertain how much symptomatic improvement she can achieve with current conservative management.  She may benefit from palliative care consultation at the nursing home facility.    Review of Systems:     All systems were reviewed and negative except as mentioned in subjective, assessment and plan.    Vital Signs:    Temp:  [97.8 °F (36.6 °C)-98.9 °F (37.2 °C)] 98.1 °F (36.7 °C)  Heart Rate:  [67-76] 75  Resp:  [18-24] 18  BP: (158-170)/(77-86) 170/77    Intake and output:    I/O last 3 completed shifts:  In: 820 [P.O.:820]  Out: 2300 [Urine:2300]  I/O this shift:  In: 120 [P.O.:120]  Out: -     Physical Examination:    General Appearance:  Alert and cooperative.  Obese.   Head:  Atraumatic and normocephalic.   Eyes: Conjunctivae and sclerae normal, no icterus. No pallor.   Throat: No oral lesions, no thrush, oral mucosa moist.   Neck: Supple, trachea midline, no thyromegaly.   Lungs:   Breath sounds heard bilaterally equally.  Bilateral extensive wheezing heard.  Occasional basal crackles. No Pleural rub or bronchial breathing.   Heart:  Irregular S1 and S2, no murmur, no gallop, no rub. No JVD.   Abdomen:   Normal bowel sounds, no masses, no organomegaly. Soft, nontender, obese, no rebound tenderness.   Extremities: Supple, 1+ pitting ankle edema, no cyanosis, no clubbing.   Skin: No bleeding or rash.   Neurologic: Alert but  drowsy. No facial asymmetry. Moves all four limbs but does have generalized weakness. No tremors.      Laboratory results:    Results from last 7 days   Lab Units 07/14/23  0553 07/13/23  0530 07/12/23  0335 07/11/23  2308   SODIUM mmol/L 137 135* 132* 130*   POTASSIUM mmol/L 3.9 4.2 4.1 4.8   CHLORIDE mmol/L 96* 95* 92* 92*   CO2 mmol/L 28.9 29.7* 27.5 26.7   BUN mg/dL 37* 33* 22 20   CREATININE mg/dL 0.81 1.10* 0.86 0.82   CALCIUM mg/dL 8.9 8.9 9.3 9.0   BILIRUBIN mg/dL  --   --   --  0.3   ALK PHOS U/L  --   --   --  81   ALT (SGPT) U/L  --   --   --  6   AST (SGOT) U/L  --   --   --  12   GLUCOSE mg/dL 230* 333* 261* 248*       Results from last 7 days   Lab Units 07/14/23  0553 07/12/23  0335 07/11/23  2244   WBC 10*3/mm3 12.36* 15.11* 14.92*   HEMOGLOBIN g/dL 10.6* 11.7* 11.3*   HEMATOCRIT % 33.7* 36.9 35.6   PLATELETS 10*3/mm3 234 231 245           Results from last 7 days   Lab Units 07/12/23  0533 07/12/23  0335   HSTROP T ng/L 23* 23*       Results from last 7 days   Lab Units 07/12/23  0345 07/12/23  0335   BLOODCX  No growth at 2 days No growth at 2 days       Recent Labs     07/12/23  0100   PHART 7.430   TVS0PVZ 46.9*   PO2ART 74.9*   JSI2AKW 31.1*   BASEEXCESS 5.8*        I have reviewed the patient's laboratory results.    Radiology results:    XR Chest 1 View    Result Date: 7/14/2023  X-RAY CHEST ONE VIEW  INDICATION:  Shortness of air and wheezing; I50.9-Heart failure, unspecified; J44.1-Chronic obstructive pulmonary disease with (acute) exacerbation.  FINDINGS:  A portable view of the chest was obtained.  Comparison is made to a prior exam dated 07/11/2023.   Heart size is stable. Fullness to the inferior right hilum is again noted. Increased interstitial markings and left basilar opacity are unchanged. A small left pleural effusion is unchanged.      Impression: No interval change in the appearance of the chest since recent exam.  This report was signed and finalized on 7/14/2023 10:51 AM by  Jo Ann Montero MD.   I have reviewed the patient's radiology reports.    Medication Review:     I have reviewed the patient's active and prn medications.     Problem List:      Acute on chronic heart failure with preserved ejection fraction (HFpEF)    Acute on chronic respiratory failure with hypoxia    COPD with acute exacerbation    Type 2 diabetes mellitus with unspecified complications    Chronic atrial fibrillation    Pulmonary hypertension    Acute exacerbation of CHF (congestive heart failure)    Assessment:    Acute on chronic hypoxic respiratory failure, POA.  Acute on chronic diastolic heart failure, POA.  Acute COPD exacerbation, POA.  Hyponatremia, not clinically significant, POA.  Diabetes mellitus type 2 on insulin.  Chronic atrial fibrillation on apixaban.  Essential hypertension.  Moderate to severe pulmonary hypertension.  Acquired hypothyroidism.    Plan:    Respiratory failure/diastolic heart failure.  - Continue nasal cannula oxygen with intermittent BiPAP therapy to maintain saturations above 90%.  - We will give her 1 dose of IV Lasix today and we will switch her to oral Lasix from tomorrow.  - We will continue lisinopril and Toprol-XL from home medication list.  - Patient recently had an echocardiogram on 5/12/2023 which showed hyperdynamic left ventricular ejection fraction at 66%, grade 3 diastolic dysfunction as well as moderate to severe pulmonary hypertension.  - Patient does seem to have moderate to severe pulm hypertension, diastolic heart failure and unfortunately has a poor prognosis.    Acute COPD exacerbation.  - Continue nasal cannula oxygen.  - Continue bronchodilators, budesonide, IV Solu-Medrol (increased to every 8 hours) and mucolytic agents.  - Continue antibiotics therapy with doxycycline.    Chronic atrial fibrillation.  - Continue Toprol-XL, apixaban.    Diabetes mellitus type 2.  - Continue Glucomander insulin protocol with basal and correction insulin.  - Hemoglobin A1c  8.9 on 7/12/2023.    Essential hypertension/hypothyroidism.  - Continue amlodipine, Imdur, lisinopril and Toprol-XL.  - Continue levothyroxine.  - TSH levels are within normal limits.    Patient is mostly bedbound at the nursing home facility and no indication for physical therapy.  Discussed with nursing staff at the bedside and multidisciplinary team.    We will consult palliative care services.    I have discussed the patient's condition and treatment plan with the patient's daughter Laura at the bedside as well as her daughter Desiree over the phone today.      DVT Prophylaxis: Apixaban  Code Status: DNR/DNI  Diet: Cardiac diabetic  Discharge Plan: Back to SNF tomorrow with palliative care service.    Doug Vegas MD  07/14/23  11:21 EDT    Dictated utilizing Dragon dictation.     .

## 2023-07-14 NOTE — CASE MANAGEMENT/SOCIAL WORK
Case Management/Social Work    Patient Name:  Breonna oGngora  YOB: 1935  MRN: [id number]  Admit Date:  7/11/2023      Spoke to [facility]/Admission at [facility] regarding pt returning to [facility]. States pt can return when ready. Will need a COVID prior to discharging.  Report number is [phone] ask for TALAT tboin.  Fax number for dc summary is [phone].  If pt discharges over the weekend dc summary will need to be faxed to [phone].  Pt will need EMS transport back to facility.  Updated Physician via secure chat and numbers placed on sticky note for the team.        Electronically signed by:  Gilda Cline  07/14/23 08:43 EDT

## 2023-07-14 NOTE — PLAN OF CARE
"Goal Outcome Evaluation:      Palliative Care Team Consult received for Goals of Care Discussion/ACP.    Pt is CODE STATUS of : No CPR, Limited Support--No Intubation.      Pt reports has a Living Will but there is none in the EMR    Pt admitted via ED from [facility] facility with SOA x 3 days and cough    Admission Assessment:   CHF exac,  COPD exac, Hyponatremia    PMHx:  COPD (O2 @ 3L), HF with preserved EF, HTN, DM-2, A.Fib, Hypothyroid, Depression, Stroke, [place], Pulmonary HTN    Spoke with pt's primary nurse, Elicia AMBROSIO, prior to visit.   Reported pt very pleasant and is [place].    Pt awake and alert at time of visit. She confirmed she was from [facility] and has been there since Dec 2022.      Pt reported not sleeping well last night but had just taken a \"good nap\".  Attempted to explained Palliative Care for pt's with chronic illness.   Pt [place] making specific communication difficult.   Pt spoke of being at [facility] and staff being nice.  She spoke a lot of her children and grandchildren---she is very proud of each of them.  I attempted to explain Palliative Care as a \"nurse visiting her once a month to see how she was doing\"  Pt still did not understand the concept.       After we visited a little longer, I asked if she was OK if I called one of her daughters.  She related her daughter, Laura , lived in [place] and she agreed I could contact her.    Updated primary nurse, Elicia AMBROSIO.    Attempted to contact Laura Garduno [phone].  Had to leave a voice message to please return the call to [phone].    Notified HCP of potential PC referral when pt returns to [facility].  Demographic provided.  Informed if daughter had not returned call before office closed, we will FU on Monday.      1740--Daughter has not returned call.            "

## 2023-07-15 VITALS
HEIGHT: 60 IN | OXYGEN SATURATION: 99 % | HEART RATE: 67 BPM | RESPIRATION RATE: 18 BRPM | BODY MASS INDEX: 32.46 KG/M2 | WEIGHT: 165.34 LBS | TEMPERATURE: 97.8 F | DIASTOLIC BLOOD PRESSURE: 88 MMHG | SYSTOLIC BLOOD PRESSURE: 151 MMHG

## 2023-07-15 LAB
ANION GAP SERPL CALCULATED.3IONS-SCNC: 10.6 MMOL/L (ref 5–15)
BUN SERPL-MCNC: 38 MG/DL (ref 8–23)
BUN/CREAT SERPL: 44.7 (ref 7–25)
CALCIUM SPEC-SCNC: 8.9 MG/DL (ref 8.6–10.5)
CHLORIDE SERPL-SCNC: 97 MMOL/L (ref 98–107)
CO2 SERPL-SCNC: 30.4 MMOL/L (ref 22–29)
CREAT SERPL-MCNC: 0.85 MG/DL (ref 0.57–1)
DEPRECATED RDW RBC AUTO: 45.2 FL (ref 37–54)
EGFRCR SERPLBLD CKD-EPI 2021: 66.4 ML/MIN/1.73
ERYTHROCYTE [DISTWIDTH] IN BLOOD BY AUTOMATED COUNT: 13.9 % (ref 12.3–15.4)
GLUCOSE BLDC GLUCOMTR-MCNC: 216 MG/DL (ref 70–130)
GLUCOSE SERPL-MCNC: 227 MG/DL (ref 65–99)
HCT VFR BLD AUTO: 34.9 % (ref 34–46.6)
HGB BLD-MCNC: 11 G/DL (ref 12–15.9)
MCH RBC QN AUTO: 27.8 PG (ref 26.6–33)
MCHC RBC AUTO-ENTMCNC: 31.5 G/DL (ref 31.5–35.7)
MCV RBC AUTO: 88.4 FL (ref 79–97)
PLATELET # BLD AUTO: 287 10*3/MM3 (ref 140–450)
PMV BLD AUTO: 12.2 FL (ref 6–12)
POTASSIUM SERPL-SCNC: 3.9 MMOL/L (ref 3.5–5.2)
PROCALCITONIN SERPL-MCNC: 0.09 NG/ML (ref 0–0.25)
RBC # BLD AUTO: 3.95 10*6/MM3 (ref 3.77–5.28)
SODIUM SERPL-SCNC: 138 MMOL/L (ref 136–145)
WBC NRBC COR # BLD: 11.52 10*3/MM3 (ref 3.4–10.8)

## 2023-07-15 PROCEDURE — 85027 COMPLETE CBC AUTOMATED: CPT | Performed by: INTERNAL MEDICINE

## 2023-07-15 PROCEDURE — 25010000002 METHYLPREDNISOLONE PER 125 MG: Performed by: INTERNAL MEDICINE

## 2023-07-15 PROCEDURE — 84145 PROCALCITONIN (PCT): CPT | Performed by: INTERNAL MEDICINE

## 2023-07-15 PROCEDURE — 94660 CPAP INITIATION&MGMT: CPT

## 2023-07-15 PROCEDURE — 80048 BASIC METABOLIC PNL TOTAL CA: CPT | Performed by: INTERNAL MEDICINE

## 2023-07-15 PROCEDURE — 94799 UNLISTED PULMONARY SVC/PX: CPT

## 2023-07-15 PROCEDURE — 82948 REAGENT STRIP/BLOOD GLUCOSE: CPT

## 2023-07-15 PROCEDURE — 94664 DEMO&/EVAL PT USE INHALER: CPT

## 2023-07-15 PROCEDURE — 63710000001 INSULIN ASPART PER 5 UNITS: Performed by: INTERNAL MEDICINE

## 2023-07-15 PROCEDURE — 94761 N-INVAS EAR/PLS OXIMETRY MLT: CPT

## 2023-07-15 PROCEDURE — 99239 HOSP IP/OBS DSCHRG MGMT >30: CPT | Performed by: INTERNAL MEDICINE

## 2023-07-15 RX ORDER — LORAZEPAM 0.5 MG/1
0.5 TABLET ORAL EVERY 6 HOURS PRN
Qty: 12 TABLET | Refills: 0 | Status: SHIPPED | OUTPATIENT
Start: 2023-07-15

## 2023-07-15 RX ORDER — FUROSEMIDE 40 MG/1
40 TABLET ORAL DAILY
Qty: 30 TABLET | Refills: 0
Start: 2023-07-15 | End: 2023-08-14

## 2023-07-15 RX ORDER — IPRATROPIUM BROMIDE AND ALBUTEROL SULFATE 2.5; .5 MG/3ML; MG/3ML
3 SOLUTION RESPIRATORY (INHALATION) EVERY 6 HOURS PRN
Qty: 360 ML
Start: 2023-07-15

## 2023-07-15 RX ORDER — PREDNISONE 10 MG/1
TABLET ORAL
Qty: 30 TABLET | Refills: 0
Start: 2023-07-15 | End: 2023-07-27

## 2023-07-15 RX ADMIN — BUDESONIDE 0.5 MG: 0.5 INHALANT RESPIRATORY (INHALATION) at 07:12

## 2023-07-15 RX ADMIN — SENNOSIDES AND DOCUSATE SODIUM 2 TABLET: 50; 8.6 TABLET ORAL at 08:29

## 2023-07-15 RX ADMIN — FLUTICASONE PROPIONATE 1 SPRAY: 50 SPRAY, METERED NASAL at 09:53

## 2023-07-15 RX ADMIN — AMLODIPINE BESYLATE 10 MG: 5 TABLET ORAL at 08:29

## 2023-07-15 RX ADMIN — METHYLPREDNISOLONE SODIUM SUCCINATE 60 MG: 125 INJECTION, POWDER, FOR SOLUTION INTRAMUSCULAR; INTRAVENOUS at 03:01

## 2023-07-15 RX ADMIN — ISOSORBIDE MONONITRATE 60 MG: 60 TABLET, EXTENDED RELEASE ORAL at 08:29

## 2023-07-15 RX ADMIN — METOPROLOL SUCCINATE 50 MG: 50 TABLET, EXTENDED RELEASE ORAL at 08:29

## 2023-07-15 RX ADMIN — IPRATROPIUM BROMIDE AND ALBUTEROL SULFATE 3 ML: .5; 3 SOLUTION RESPIRATORY (INHALATION) at 07:12

## 2023-07-15 RX ADMIN — FUROSEMIDE 40 MG: 40 TABLET ORAL at 08:29

## 2023-07-15 RX ADMIN — INSULIN ASPART 5 UNITS: 100 INJECTION, SOLUTION INTRAVENOUS; SUBCUTANEOUS at 08:29

## 2023-07-15 RX ADMIN — APIXABAN 5 MG: 5 TABLET, FILM COATED ORAL at 08:29

## 2023-07-15 RX ADMIN — LISINOPRIL 20 MG: 20 TABLET ORAL at 08:29

## 2023-07-15 RX ADMIN — LEVOTHYROXINE SODIUM 75 MCG: 75 TABLET ORAL at 08:29

## 2023-07-15 RX ADMIN — CHLORDIAZEPOXIDE HYDROCHLORIDE 5 MG: 5 CAPSULE ORAL at 08:29

## 2023-07-15 RX ADMIN — PANTOPRAZOLE SODIUM 40 MG: 40 TABLET, DELAYED RELEASE ORAL at 05:12

## 2023-07-15 RX ADMIN — BISACODYL 5 MG: 5 TABLET, COATED ORAL at 08:29

## 2023-07-15 NOTE — CASE MANAGEMENT/SOCIAL WORK
Case Management Discharge Note                Selected Continued Care - Discharged on 7/15/2023 Admission date: 7/11/2023 - Discharge disposition: Skilled Nursing Facility (DC - External)      Destination Coordination complete.      Service Provider Selected Services Address Phone Fax Patient Preferred    [facility] [place] [address] [address] [phone] --              Durable Medical Equipment    No services have been selected for the patient.                Dialysis/Infusion    No services have been selected for the patient.                Home Medical Care    No services have been selected for the patient.                Therapy    No services have been selected for the patient.                Community Resources    No services have been selected for the patient.                Community & DME    No services have been selected for the patient.                    Selected Continued Care - Prior Encounters Includes continued care and service providers with selected services from prior encounters from 4/12/2023 to 7/15/2023      Discharged on 5/16/2023 Admission date: 5/11/2023 - Discharge disposition: Intermediate Care       Destination       Service Provider Selected Services Address Phone Fax Patient Preferred    [facility] [place] [address] [address] [phone] --                          Transportation Services  Ambulance: [facility]    Final Discharge Disposition Code: 04 - intermediate care facility

## 2023-07-15 NOTE — PLAN OF CARE
Goal Outcome Evaluation:                      Patient clear for discharge back to [facility]

## 2023-07-15 NOTE — DISCHARGE SUMMARY
[facility]   DISCHARGE SUMMARY      Name:  Breonna Gongora   Age:  87 y.o.  Sex:  female  :  1935  MRN:  [id number]   Visit Number:  [id number]    Admission Date:  2023  Date of Discharge:  7/15/2023  Primary Care Physician:  Provider, No Known    Important issues to note:    1.  Patient was admitted with shortness of breath and was noted to have COPD exacerbation as well as diastolic heart failure exacerbation.  Unfortunately, patient does have multiple core morbidities and functional quadriplegia and mostly bedbound.  She has severe pulmonary hypertension and has poor prognosis.  She will benefit from BiPAP therapy at the nursing home facility.  She will also benefit from palliative care services.  2.  Continue BiPAP therapy at night (settings: IPAP 10, EPAP 5, rate 15, FiO2 35%).  Continue nasal cannula oxygen at 2 L to maintain saturations above 90%.  3.  Continue prednisone taper as prescribed 40 mg for 3 days, 30 mg for 3 days, 20 mg for 3 days, 10 mg for 3 days and then stop.  4.  Lasix dose has been changed to 40 mg daily in the morning.  5.  Patient was not taking multiple medications listed on her med list and the med list has been updated as outlined below.    Discharge Diagnoses:     Acute on chronic hypoxic respiratory failure, POA.  Acute on chronic diastolic heart failure, POA, improved.  Acute COPD exacerbation, POA, improving.  Hyponatremia, not clinically significant, POA.  Diabetes mellitus type 2 on insulin.  Chronic atrial fibrillation on apixaban.  Essential hypertension.  Moderate to severe pulmonary hypertension.  Acquired hypothyroidism.  Functional quadriplegia.  Stage 2 Pressure Injury to Coccyx, POA.    Problem List:     Active Hospital Problems    Diagnosis  POA    **Acute on chronic heart failure with preserved ejection fraction (HFpEF) [I50.33]  Yes    Acute on chronic respiratory failure with hypoxia [J96.21]  Yes     Priority: High    COPD  with acute exacerbation [J44.1]  Yes     Priority: Medium    Acute exacerbation of CHF (congestive heart failure) [I50.9]  Yes    Pulmonary hypertension [I27.20]  Yes    Type 2 diabetes mellitus with unspecified complications [E11.8]  Yes    Chronic atrial fibrillation [I48.20]  Yes      Resolved Hospital Problems   No resolved problems to display.     Presenting Problem:    Chief Complaint   Patient presents with    URI      Consults:     Consulting Physician(s)               None            Procedures Performed:    None.    History of presenting illness/Hospital Course:    Ms. Gongora is an 87-year-old woman, nursing home resident at [facility], with past medical history of COPD on 3 L baseline, heart failure preserved ejection fraction, hypertension, type 2 diabetes, atrial fibrillation, hypothyroidism, depression, history of stroke, hard of hearing, pulmonary hypertension was sent to the emergency room with concern for shortness of air and nonproductive cough for 3 days.  She recently been started on Zithromax at her nursing home.     ED summary: Afebrile, hypertensive as high as 190/89, otherwise vital signs stable on baseline oxygen 3 L.  Increased work of breath, appears diaphoretic.  EKG atrial fibrillation, no ST elevations or depressions.  ABG compensated; pH 7.43, PCO2 46, PO2 74, bicarb 31.  proBNP chronically elevated, currently over 12,000.  Sodium 130.  Blood glucose 248.  Gait normal.  White count 14./Flu negative.  CXR blunting left lower field chronic.  She was provided Tylenol, Lasix 80 mg IV, DuoNeb, Nitrostat for blood pressure and was subsequently admitted to the medical floor with telemetry.     Patient was continued on IV Lasix as well as doxycycline for CHF and pneumonia.  She was continued on Levemir and subcutaneous insulin protocol for hyperglycemia.  Patient was seen by speech therapy who recommended mechanical soft diet with thin liquids as tolerated.  Repeat chest x-ray after 2 days of IV  Lasix showed similar findings without any worsening.  Due to persistent wheezing, she was placed on IV Solu-Medrol as well as budesonide nebulizations.  She was continued on doxycycline for 3 days.  Respiratory panel was negative for atypical organisms.     Patient has multiple comorbidities as well as severe pulmonary hypertension.  She is chronically bedbound and her prognosis is extremely poor.  It is uncertain how much symptomatic improvement she can achieve with current conservative management.  She may benefit from palliative care consultation at the nursing home facility.    Patient is currently feeling better with regards to her shortness of breath and does not have any further wheezing.  We will discharge her back to the nursing home facility with 40 mg Lasix daily and prednisone taper.  She will benefit from continued use of BiPAP therapy at the nursing home facility.  I discussed the patient's condition and discharge plan with her daughter Laura over the phone today.  Discussed with nursing staff.    Vital Signs:    Temp:  [97.8 °F (36.6 °C)-98.3 °F (36.8 °C)] 97.8 °F (36.6 °C)  Heart Rate:  [67-84] 67  Resp:  [17-24] 18  BP: (145-172)/() 151/88    Physical Exam:    General Appearance:  Alert and cooperative.  Obese.   Head:  Atraumatic and normocephalic.   Eyes: Conjunctivae and sclerae normal, no icterus. No pallor.   Ears:  Ears with no abnormalities noted.   Throat: No oral lesions, no thrush, oral mucosa moist.   Neck: Supple, trachea midline, no thyromegaly.   Back:   No kyphoscoliosis present. No tenderness to palpation.   Lungs:   Breath sounds heard bilaterally equally.  No wheezing today but has bilateral basal crackles much improved from admission. No Pleural rub or bronchial breathing.   Heart:  Normal S1 and S2, no murmur, no gallop, no rub. No JVD.   Abdomen:   Normal bowel sounds, no masses, no organomegaly. Soft, nontender, nondistended, no rebound tenderness.   Extremities:  Supple, 1+ pitting ankle edema, no cyanosis, no clubbing.   Pulses: Pulses palpable bilaterally.   Skin: No bleeding or rash.   Neurologic: Alert and oriented x 3. No facial asymmetry. Moves all four limbs but does have generalized weakness. No tremors.     Pertinent Lab Results:     Results from last 7 days   Lab Units 07/15/23  0523 07/14/23  0553 07/13/23  0530 07/12/23  0335 07/11/23  2308   SODIUM mmol/L 138 137 135*   < > 130*   POTASSIUM mmol/L 3.9 3.9 4.2   < > 4.8   CHLORIDE mmol/L 97* 96* 95*   < > 92*   CO2 mmol/L 30.4* 28.9 29.7*   < > 26.7   BUN mg/dL 38* 37* 33*   < > 20   CREATININE mg/dL 0.85 0.81 1.10*   < > 0.82   CALCIUM mg/dL 8.9 8.9 8.9   < > 9.0   BILIRUBIN mg/dL  --   --   --   --  0.3   ALK PHOS U/L  --   --   --   --  81   ALT (SGPT) U/L  --   --   --   --  6   AST (SGOT) U/L  --   --   --   --  12   GLUCOSE mg/dL 227* 230* 333*   < > 248*    < > = values in this interval not displayed.     Results from last 7 days   Lab Units 07/15/23  0523 07/14/23  0553 07/12/23  0335   WBC 10*3/mm3 11.52* 12.36* 15.11*   HEMOGLOBIN g/dL 11.0* 10.6* 11.7*   HEMATOCRIT % 34.9 33.7* 36.9   PLATELETS 10*3/mm3 287 234 231         Results from last 7 days   Lab Units 07/12/23  0533 07/12/23  0335   HSTROP T ng/L 23* 23*     Results from last 7 days   Lab Units 07/11/23  2308   PROBNP pg/mL 12,534.0*             Results from last 7 days   Lab Units 07/12/23  0100   PH, ARTERIAL pH units 7.430   PO2 ART mm Hg 74.9*   PCO2, ARTERIAL mm Hg 46.9*   HCO3 ART mmol/L 31.1*     Results from last 7 days   Lab Units 07/12/23  0345 07/12/23  0335   BLOODCX  No growth at 3 days No growth at 3 days     Pertinent Radiology Results:    Imaging Results (All)       Procedure Component Value Units Date/Time    XR Chest 1 View [[id number]] Collected: 07/14/23 1007     Updated: 07/14/23 1053    Narrative:      X-RAY CHEST ONE VIEW     INDICATION:  Shortness of air and wheezing; I50.9-Heart failure,  unspecified; J44.1-Chronic  obstructive pulmonary disease with (acute)  exacerbation.      FINDINGS:  A portable view of the chest was obtained.  Comparison is  made to a prior exam dated 07/11/2023.   Heart size is stable. Fullness  to the inferior right hilum is again noted. Increased interstitial  markings and left basilar opacity are unchanged. A small left pleural  effusion is unchanged.       Impression:      No interval change in the appearance of the chest since  recent exam.     This report was signed and finalized on 7/14/2023 10:51 AM by Jo Ann Montero MD.    XR Chest 1 View [[id number]] Collected: 07/12/23 0751     Updated: 07/12/23 0902    Narrative:      PROCEDURE: XR CHEST 1 VIEW-     INDICATION:  dyspnea     FINDINGS:  A portable view of the chest was obtained.  Comparison is  made to a prior exam dated 05/11/2023.   Heart size is stable. There is  an abnormal contour to the inferior right hilum. Mass or lymphadenopathy  is not excluded. Interstitial opacities have worsened in the interval.  Additionally, there is worsening left basilar airspace disease. A small  left pleural effusion is stable. No pneumothorax.       Impression:      1. Abnormal right hilar contour. Mass or lymphadenopathy not excluded.  Consider CT chest with contrast.  2. Worsening interstitial opacities. Favor pulmonary edema.  3. Worsening left basilar opacity, atelectasis versus pneumonia.     This report was signed and finalized on 7/12/2023 9:00 AM by Jo Ann Montero MD.     Echo:    Results for orders placed during the hospital encounter of 05/11/23    Adult Transthoracic Echo Complete With Contrast if Necessary Per Protocol    Interpretation Summary    Left ventricular ejection fraction appears to be 66 - 70%.    Left ventricular wall thickness is consistent with moderate concentric hypertrophy.    Left ventricular diastolic function is consistent with (grade III w/high LAP) reversible restrictive pattern.    The right ventricular cavity is mild to  moderately dilated.    The right atrial cavity is borderline dilated.    There is moderate calcification of the aortic valve mainly affecting the non-coronary, left coronary and right coronary cusp(s).    Estimated right ventricular systolic pressure from tricuspid regurgitation is markedly elevated (>55 mmHg).    Moderate to severe pulmonary hypertension is present.    There is a small (<1cm) pericardial effusion adjacent to the right atrium. There is no evidence of cardiac tamponade.    Condition on Discharge:      Stable.    Code status during the hospital stay:    Code Status and Medical Interventions:   Ordered at: 07/12/23 0312     Medical Intervention Limits:    NO intubation (DNI)     Code Status (Patient has no pulse and is not breathing):    No CPR (Do Not Attempt to Resuscitate)     Medical Interventions (Patient has pulse or is breathing):    Limited Support     Discharge Disposition:    Skilled Nursing Facility ([place] - External)    Discharge Medications:       Discharge Medications        New Medications        Instructions Start Date   predniSONE 10 MG tablet  Commonly known as: DELTASONE   Take 4 tablets by mouth Daily for 3 days, THEN 3 tablets Daily for 3 days, THEN 2 tablets Daily for 3 days, THEN 1 tablet Daily for 3 days.   Start Date: July 15, 2023            Changes to Medications        Instructions Start Date   amLODIPine 10 MG tablet  Commonly known as: NORVASC  What changed: when to take this   10 mg, Oral, Every 24 Hours Scheduled      furosemide 40 MG tablet  Commonly known as: Lasix  What changed:   medication strength  how much to take  when to take this   40 mg, Oral, Daily             Continue These Medications        Instructions Start Date   apixaban 5 MG tablet tablet  Commonly known as: ELIQUIS   5 mg, Oral, 2 Times Daily      chlordiazePOXIDE 5 MG capsule  Commonly known as: LIBRIUM   5 mg, Oral, 2 Times Daily      docusate sodium 100 MG capsule  Commonly known as: COLACE   100 mg,  Oral, Nightly      fluticasone 50 MCG/ACT nasal spray  Commonly known as: FLONASE   1 spray, Nasal, Daily      insulin detemir 100 UNIT/ML injection  Commonly known as: LEVEMIR   20 Units, Subcutaneous, Nightly      ipratropium-albuterol 0.5-2.5 mg/3 ml nebulizer  Commonly known as: DUO-NEB   3 mL, Nebulization, Every 6 Hours PRN      isosorbide mononitrate 30 MG 24 hr tablet  Commonly known as: IMDUR   60 mg, Oral, Daily      levothyroxine 75 MCG tablet  Commonly known as: SYNTHROID, LEVOTHROID   75 mcg, Oral, Daily      lisinopril 20 MG tablet  Commonly known as: PRINIVIL,ZESTRIL   20 mg, Oral, Daily      loratadine 10 MG tablet  Commonly known as: CLARITIN   10 mg, Oral, Daily      LORazepam 0.5 MG tablet  Commonly known as: ATIVAN   0.5 mg, Oral, Every 6 Hours PRN      magnesium oxide 400 MG tablet  Commonly known as: MAG-OX   400 mg, Oral, 2 Times Weekly, TUES AND FRID      Melatonin 10 MG tablet   10 mg, Oral, Nightly      metoprolol succinate XL 50 MG 24 hr tablet  Commonly known as: TOPROL-XL   50 mg, Oral, Daily      polyethylene glycol 17 GM/SCOOP powder  Commonly known as: MIRALAX   17 g, Oral, 2 Times Daily             Stop These Medications      acetaminophen 325 MG tablet  Commonly known as: TYLENOL     azithromycin 250 MG tablet  Commonly known as: ZITHROMAX     Boudreauxs Butt Paste 16 % ointment  Generic drug: Zinc Oxide     cholecalciferol 25 MCG (1000 UT) tablet  Commonly known as: VITAMIN D3     clopidogrel 75 MG tablet  Commonly known as: PLAVIX     HYDROcodone-acetaminophen 5-325 MG per tablet  Commonly known as: NORCO     Insulin Aspart 100 UNIT/ML injection  Commonly known as: novoLOG     mirtazapine 15 MG disintegrating tablet  Commonly known as: REMERON SOL-TAB     naloxone 4 MG/0.1ML nasal spray  Commonly known as: NARCAN     nitroglycerin 0.4 MG SL tablet  Commonly known as: NITROSTAT     nystatin 263696 UNIT/GM ointment  Commonly known as: MYCOSTATIN     pantoprazole 40 MG pack  packet  Commonly known as: PROTONIX     potassium chloride 20 MEQ CR tablet  Commonly known as: K-DUR,KLOR-CON     Pseudoephedrine-guaiFENesin -1200 MG tablet sustained-release 12 hour     sertraline 50 MG tablet  Commonly known as: ZOLOFT     simethicone 80 MG chewable tablet  Commonly known as: MYLICON     SITagliptin 100 MG tablet  Commonly known as: JANUVIA     sucralfate 1 g tablet  Commonly known as: CARAFATE            Discharge Diet:     Diet Instructions       Diet: Cardiac Diets, Diabetic Diets, Fluid Restriction (240 mL/tray) Diets; Healthy Heart (2-3 Na+); Mechanical Ground (NDD 2); Thin (IDDSI 0); Consistent Carbohydrate; 1500 mL/day      Discharge Diet:  Cardiac Diets  Diabetic Diets  Fluid Restriction (240 mL/tray) Diets       Cardiac Diet: Healthy Heart (2-3 Na+)    Texture: Mechanical Ground (NDD 2)    Fluid Consistency: Thin (IDDSI 0)    Diabetic Diet: Consistent Carbohydrate    Fluid Restriction Diet (240 mL/tray): 1500 mL/day          Activity at Discharge:     Activity Instructions       Activity as Tolerated            Follow-up Appointments:     Follow-up Information       Provider, No Known .    Contact information:  [facility] [id number]                           Test Results Pending at Discharge:    Pending Labs       Order Current Status    Acinetobacter Screen - Swab, Nares Preliminary result    Blood Culture - Blood, Hand, Left Preliminary result    Blood Culture - Blood, Hand, Right Preliminary result    CANDIDA AURIS SCREEN - Swab, Axilla Right, Axilla Left and Groin Preliminary result             Doug Vegas MD  07/15/23  10:35 EDT    Time: I spent 35 minutes on this discharge activity which included: face-to-face encounter with the patient, reviewing the data in the system, coordination of the care with the nursing staff as well as consultants, documentation, and entering orders.     Dictated utilizing Dragon dictation.

## 2023-07-15 NOTE — PLAN OF CARE
Goal Outcome Evaluation:  Plan of Care Reviewed With: patient        Progress: no change  Outcome Evaluation: Maintaining o2 sats >90% on 2L NC, wore bipap for about 6 hours, c/o pain with anxiety controlled with PRN meds.

## 2023-07-17 LAB
BACTERIA ISLT: NORMAL
BACTERIA SPEC AEROBE CULT: NORMAL
BACTERIA SPEC AEROBE CULT: NORMAL

## 2023-07-20 LAB — ACINETOBACTER SCREEN CX: NORMAL

## 2023-07-26 DIAGNOSIS — F32.0 MAJOR DEPRESSIVE DISORDER, SINGLE EPISODE, MILD: ICD-10-CM

## 2023-07-26 RX ORDER — CHLORDIAZEPOXIDE HYDROCHLORIDE 5 MG/1
5 CAPSULE, GELATIN COATED ORAL 2 TIMES DAILY
Qty: 60 CAPSULE | Refills: 3 | Status: SHIPPED | OUTPATIENT
Start: 2023-07-26

## 2023-07-26 NOTE — TELEPHONE ENCOUNTER
[facility] MEDICATION REFILL REQUEST FOR CHLORDIAZEPOXIDE 5 MG.    DIRECTIONS: TAKE 1 CAPSULE BY MOUTH 2 TIMES A DAY.

## 2023-08-10 DIAGNOSIS — G47.00 INSOMNIA, UNSPECIFIED TYPE: ICD-10-CM

## 2023-08-10 RX ORDER — LORAZEPAM 0.5 MG/1
0.5 TABLET ORAL EVERY 6 HOURS PRN
Qty: 120 TABLET | Refills: 0 | Status: SHIPPED | OUTPATIENT
Start: 2023-08-10

## 2023-08-21 NOTE — PROGRESS NOTES
Regular  Nursing Home Progress Note        Justino Weston DO   [address] [place] [address] Phone: [phone]  Fax: [phone]     PATIENT NAME: Breonna Gongora                                                                          YOB: 1935           DATE OF SERVICE: 08/22/2023  FACILITY:  [facility]  ______________________________________________________________________     CHIEF COMPLAINT:  Chronic Medical Management      HISTORY OF PRESENT ILLNESS:   Patient is an 87-year-old female with a history of type 2 diabetes mellitus, hypertension, CHF, A-fib, and hypothyroidism who has been a resident at [facility] since December 2022 after suffering non-STEMI which was treated conservatively and with hospice goals.  She did fairly well with supportive care until May of this year where she developed CHF exacerbation and was treated with aggressive diuretics during hospitalization.  In July, patient also suffered another episode of shortness of breath determined to be due to COPD exacerbation as well as diastolic congestive heart failure.  She was treated with steroids and aggressive diuresis which was tapered down upon transfer to this facility.    On exam today, patient was laying comfortably in her bed with no specific complaints or concerns.  Nurses report no acute events over the last month.  She is on weekly weights due to CHF with as needed Lasix as needed.  She remained stable on 2 L nasal cannula    PAST MEDICAL & SURGICAL HISTORY:   Past Medical History:   Diagnosis Date    Allergic     Anemia     CHF (congestive heart failure)     Depression     Hypothyroidism     Paroxysmal A-fib     Stroke       Past Surgical History:   Procedure Laterality Date    APPENDECTOMY      CATARACT EXTRACTION N/A     TOTAL ABDOMINAL HYSTERECTOMY WITH SALPINGO OOPHORECTOMY           MEDICATIONS:  I have reviewed and reconciled the patients medication list in the patients chart at the long-term  facility today, 08/22/2023.      ALLERGIES:  Allergies   Allergen Reactions    Penicillins Unknown - High Severity    Sulfa Antibiotics Unknown - High Severity         SOCIAL HISTORY:  Social History     Socioeconomic History    Marital status: Unknown   Tobacco Use    Smoking status: Never    Smokeless tobacco: Never   Vaping Use    Vaping Use: Never used   Substance and Sexual Activity    Alcohol use: Never    Drug use: Never    Sexual activity: Defer       FAMILY HISTORY:  Family History   Problem Relation Age of Onset    COPD Mother     Heart disease Father     Alcohol abuse Father        REVIEW OF SYSTEMS:  Review of Systems   Constitutional:  Negative for chills, fatigue and fever.   HENT:  Negative for congestion, ear pain, rhinorrhea, sinus pressure and sore throat.    Eyes:  Negative for visual disturbance.   Respiratory:  Negative for cough, chest tightness, shortness of breath and wheezing.    Cardiovascular:  Negative for chest pain, palpitations and leg swelling.   Gastrointestinal:  Negative for abdominal pain, blood in stool, constipation, diarrhea, nausea and vomiting.   Endocrine: Negative for polydipsia and polyuria.   Genitourinary:  Negative for dysuria and hematuria.   Musculoskeletal:  Negative for arthralgias and back pain.   Skin:  Negative for rash.   Neurological:  Negative for dizziness, light-headedness, numbness and headaches.   Psychiatric/Behavioral:  Negative for dysphoric mood and sleep disturbance. The patient is not nervous/anxious.         PHYSICAL EXAMINATION:     VITAL SIGNS:  /57   Pulse 58   Temp 97.5 øF (36.4 øC)   Resp 18   Wt 73 kg (161 lb)   SpO2 99%   BMI 31.44 kg/mý     Physical Exam  Vitals and nursing note reviewed.   Constitutional:       Appearance: Normal appearance. She is well-developed.      Comments: Frail elderly female in no acute distress   HENT:      Head: Normocephalic and atraumatic.      Nose: Nose normal.      Mouth/Throat:      Mouth: Mucous  membranes are moist.      Pharynx: No oropharyngeal exudate.   Eyes:      General: No scleral icterus.     Extraocular Movements: Extraocular movements intact.      Conjunctiva/sclera: Conjunctivae normal.      Pupils: Pupils are equal, round, and reactive to light.   Neck:      Thyroid: No thyromegaly.   Cardiovascular:      Rate and Rhythm: Normal rate and regular rhythm.      Heart sounds: Murmur heard.     No friction rub. No gallop.   Pulmonary:      Effort: Pulmonary effort is normal. No respiratory distress.      Breath sounds: Normal breath sounds. No wheezing.   Abdominal:      General: Bowel sounds are normal. There is no distension.      Palpations: Abdomen is soft.      Tenderness: There is no abdominal tenderness.   Musculoskeletal:         General: No deformity or signs of injury.      Cervical back: Normal range of motion and neck supple.   Lymphadenopathy:      Cervical: No cervical adenopathy.   Skin:     General: Skin is warm and dry.      Findings: No rash.   Neurological:      General: No focal deficit present.      Mental Status: She is alert and oriented to person, place, and time.   Psychiatric:         Mood and Affect: Mood normal.         Behavior: Behavior normal.       RECORDS REVIEW:        ASSESSMENT     Diagnoses and all orders for this visit:    1. Type 2 diabetes mellitus with hyperglycemia, with long-term current use of insulin (Primary)    2. Essential hypertension    3. Major depressive disorder, single episode, mild    4. Coronary artery disease involving native heart without angina pectoris, unspecified vessel or lesion type    5. Longstanding persistent atrial fibrillation        PLAN  Type 2 diabetes mellitus  - Glucose has been well controlled in the facility.  Check CBC, CMP, TSH, and A1c every 3 months.  Labs were clarified today.    CAD/diastolic congestive heart failure  - Currently euvolemic but due to episodes of exacerbations, patient is currently on weekly weights with  as needed Lasix.  - Stable on current cardiac regimen at this time.    Atrial fibrillation  - Rate is controlled.  Continue metoprolol and Eliquis.    Hypothyroidism  - Continue monitoring TSH every 3 months.  Stable on current regimen of Synthroid.    Pulmonary hypertension  - Severe disease noted during hospitalization.  Continue supportive care.    Anxiety and depression  - Stable on mirtazapine and lorazepam.  Psychiatry is following in the facility.    Hypertension  - Stable on current medications.        28 min spent with direct patient care, review of medical chart, discussion with nursing staff, and medical decision making.       [x]  Discussed Patient in detail with nursing/staff, addressed all needs today.     [x]  Plan of Care Reviewed   []  PT/OT Reviewed   [x]  Order Changes  []  Discharge Plans Reviewed  [x]  Advance Directive on file with Nursing Home.   [x]  POA on file with Nursing Home.    [x]  Code Status listed and reviewed.     I confirm accuracy of unchanged data/findings including physical exam and plan which have been carried forward from previous visit, as well as I have updated appropriately those that have changed        Justino Weston DO.  8/25/2023

## 2023-08-22 ENCOUNTER — NURSING HOME (OUTPATIENT)
Dept: INTERNAL MEDICINE | Facility: CLINIC | Age: 88
End: 2023-08-22
Payer: MEDICARE

## 2023-08-22 VITALS
SYSTOLIC BLOOD PRESSURE: 153 MMHG | RESPIRATION RATE: 18 BRPM | BODY MASS INDEX: 31.44 KG/M2 | HEART RATE: 58 BPM | DIASTOLIC BLOOD PRESSURE: 57 MMHG | WEIGHT: 161 LBS | OXYGEN SATURATION: 99 % | TEMPERATURE: 97.5 F

## 2023-08-22 DIAGNOSIS — I25.10 CORONARY ARTERY DISEASE INVOLVING NATIVE HEART WITHOUT ANGINA PECTORIS, UNSPECIFIED VESSEL OR LESION TYPE: ICD-10-CM

## 2023-08-22 DIAGNOSIS — Z79.4 TYPE 2 DIABETES MELLITUS WITH HYPERGLYCEMIA, WITH LONG-TERM CURRENT USE OF INSULIN: Primary | ICD-10-CM

## 2023-08-22 DIAGNOSIS — I10 ESSENTIAL HYPERTENSION: ICD-10-CM

## 2023-08-22 DIAGNOSIS — I48.11 LONGSTANDING PERSISTENT ATRIAL FIBRILLATION: ICD-10-CM

## 2023-08-22 DIAGNOSIS — F32.0 MAJOR DEPRESSIVE DISORDER, SINGLE EPISODE, MILD: ICD-10-CM

## 2023-08-22 DIAGNOSIS — E11.65 TYPE 2 DIABETES MELLITUS WITH HYPERGLYCEMIA, WITH LONG-TERM CURRENT USE OF INSULIN: Primary | ICD-10-CM

## 2023-08-22 NOTE — LETTER
Regular  Nursing Home Progress Note        Justino Weston DO   [address] [place] [address] Phone: [phone]  Fax: [phone]     PATIENT NAME: Breonna Gongora                                                                          YOB: 1935           DATE OF SERVICE: 08/22/2023  FACILITY:  [facility]  ______________________________________________________________________     CHIEF COMPLAINT:  Chronic Medical Management      HISTORY OF PRESENT ILLNESS:   Patient is an 87-year-old female with a history of type 2 diabetes mellitus, hypertension, CHF, A-fib, and hypothyroidism who has been a resident at [facility] since December 2022 after suffering non-STEMI which was treated conservatively and with hospice goals.  She did fairly well with supportive care until May of this year where she developed CHF exacerbation and was treated with aggressive diuretics during hospitalization.  In July, patient also suffered another episode of shortness of breath determined to be due to COPD exacerbation as well as diastolic congestive heart failure.  She was treated with steroids and aggressive diuresis which was tapered down upon transfer to this facility.    On exam today, patient was laying comfortably in her bed with no specific complaints or concerns.  Nurses report no acute events over the last month.  She is on weekly weights due to CHF with as needed Lasix as needed.  She remained stable on 2 L nasal cannula    PAST MEDICAL & SURGICAL HISTORY:   Past Medical History:   Diagnosis Date    Allergic     Anemia     CHF (congestive heart failure)     Depression     Hypothyroidism     Paroxysmal A-fib     Stroke       Past Surgical History:   Procedure Laterality Date    APPENDECTOMY      CATARACT EXTRACTION N/A     TOTAL ABDOMINAL HYSTERECTOMY WITH SALPINGO OOPHORECTOMY           MEDICATIONS:  I have reviewed and reconciled the patients medication list in the patients chart at the halfway  facility today, 08/22/2023.      ALLERGIES:  Allergies   Allergen Reactions    Penicillins Unknown - High Severity    Sulfa Antibiotics Unknown - High Severity         SOCIAL HISTORY:  Social History     Socioeconomic History    Marital status: Unknown   Tobacco Use    Smoking status: Never    Smokeless tobacco: Never   Vaping Use    Vaping Use: Never used   Substance and Sexual Activity    Alcohol use: Never    Drug use: Never    Sexual activity: Defer       FAMILY HISTORY:  Family History   Problem Relation Age of Onset    COPD Mother     Heart disease Father     Alcohol abuse Father        REVIEW OF SYSTEMS:  Review of Systems   Constitutional:  Negative for chills, fatigue and fever.   HENT:  Negative for congestion, ear pain, rhinorrhea, sinus pressure and sore throat.    Eyes:  Negative for visual disturbance.   Respiratory:  Negative for cough, chest tightness, shortness of breath and wheezing.    Cardiovascular:  Negative for chest pain, palpitations and leg swelling.   Gastrointestinal:  Negative for abdominal pain, blood in stool, constipation, diarrhea, nausea and vomiting.   Endocrine: Negative for polydipsia and polyuria.   Genitourinary:  Negative for dysuria and hematuria.   Musculoskeletal:  Negative for arthralgias and back pain.   Skin:  Negative for rash.   Neurological:  Negative for dizziness, light-headedness, numbness and headaches.   Psychiatric/Behavioral:  Negative for dysphoric mood and sleep disturbance. The patient is not nervous/anxious.         PHYSICAL EXAMINATION:     VITAL SIGNS:  /57   Pulse 58   Temp 97.5 øF (36.4 øC)   Resp 18   Wt 73 kg (161 lb)   SpO2 99%   BMI 31.44 kg/mý     Physical Exam  Vitals and nursing note reviewed.   Constitutional:       Appearance: Normal appearance. She is well-developed.      Comments: Frail elderly female in no acute distress   HENT:      Head: Normocephalic and atraumatic.      Nose: Nose normal.      Mouth/Throat:      Mouth: Mucous  membranes are moist.      Pharynx: No oropharyngeal exudate.   Eyes:      General: No scleral icterus.     Extraocular Movements: Extraocular movements intact.      Conjunctiva/sclera: Conjunctivae normal.      Pupils: Pupils are equal, round, and reactive to light.   Neck:      Thyroid: No thyromegaly.   Cardiovascular:      Rate and Rhythm: Normal rate and regular rhythm.      Heart sounds: Murmur heard.     No friction rub. No gallop.   Pulmonary:      Effort: Pulmonary effort is normal. No respiratory distress.      Breath sounds: Normal breath sounds. No wheezing.   Abdominal:      General: Bowel sounds are normal. There is no distension.      Palpations: Abdomen is soft.      Tenderness: There is no abdominal tenderness.   Musculoskeletal:         General: No deformity or signs of injury.      Cervical back: Normal range of motion and neck supple.   Lymphadenopathy:      Cervical: No cervical adenopathy.   Skin:     General: Skin is warm and dry.      Findings: No rash.   Neurological:      General: No focal deficit present.      Mental Status: She is alert and oriented to person, place, and time.   Psychiatric:         Mood and Affect: Mood normal.         Behavior: Behavior normal.       RECORDS REVIEW:        ASSESSMENT     Diagnoses and all orders for this visit:    1. Type 2 diabetes mellitus with hyperglycemia, with long-term current use of insulin (Primary)    2. Essential hypertension    3. Major depressive disorder, single episode, mild    4. Coronary artery disease involving native heart without angina pectoris, unspecified vessel or lesion type    5. Longstanding persistent atrial fibrillation        PLAN  Type 2 diabetes mellitus  - Glucose has been well controlled in the facility.  Check CBC, CMP, TSH, and A1c every 3 months.  Labs were clarified today.    CAD/diastolic congestive heart failure  - Currently euvolemic but due to episodes of exacerbations, patient is currently on weekly weights with  as needed Lasix.  - Stable on current cardiac regimen at this time.    Atrial fibrillation  - Rate is controlled.  Continue metoprolol and Eliquis.    Hypothyroidism  - Continue monitoring TSH every 3 months.  Stable on current regimen of Synthroid.    Pulmonary hypertension  - Severe disease noted during hospitalization.  Continue supportive care.    Anxiety and depression  - Stable on mirtazapine and lorazepam.  Psychiatry is following in the facility.    Hypertension  - Stable on current medications.        28 min spent with direct patient care, review of medical chart, discussion with nursing staff, and medical decision making.       [x]  Discussed Patient in detail with nursing/staff, addressed all needs today.     [x]  Plan of Care Reviewed   []  PT/OT Reviewed   [x]  Order Changes  []  Discharge Plans Reviewed  [x]  Advance Directive on file with Nursing Home.   [x]  POA on file with Nursing Home.    [x]  Code Status listed and reviewed.     I confirm accuracy of unchanged data/findings including physical exam and plan which have been carried forward from previous visit, as well as I have updated appropriately those that have changed        Justino Weston DO.  8/25/2023

## 2023-09-06 DIAGNOSIS — G47.00 INSOMNIA, UNSPECIFIED TYPE: ICD-10-CM

## 2023-09-06 RX ORDER — LORAZEPAM 0.5 MG/1
0.5 TABLET ORAL EVERY 6 HOURS PRN
Qty: 120 TABLET | Refills: 3 | Status: SHIPPED | OUTPATIENT
Start: 2023-09-06

## 2023-09-06 NOTE — TELEPHONE ENCOUNTER
[facility] REQUESTING MED REFILL FOR LORAZEPAM 0.5 MG.    DIRECTIONS: LORAZEPAM 0.5 MG  1 TAB PO Q 6 HRS PRN.

## 2023-09-24 DIAGNOSIS — F41.9 ANXIETY: Primary | ICD-10-CM

## 2023-09-24 RX ORDER — LORAZEPAM 1 MG/1
1 TABLET ORAL NIGHTLY
Qty: 30 TABLET | Refills: 3 | Status: SHIPPED | OUTPATIENT
Start: 2023-09-24

## 2023-09-25 DIAGNOSIS — F32.0 MAJOR DEPRESSIVE DISORDER, SINGLE EPISODE, MILD: ICD-10-CM

## 2023-09-25 RX ORDER — CHLORDIAZEPOXIDE HYDROCHLORIDE 5 MG/1
5 CAPSULE, GELATIN COATED ORAL 2 TIMES DAILY
Qty: 30 CAPSULE | Refills: 3 | Status: SHIPPED | OUTPATIENT
Start: 2023-09-25 | End: 2023-09-26 | Stop reason: SDUPTHER

## 2023-09-25 NOTE — TELEPHONE ENCOUNTER
[facility] REQUESTING MED REFILL FOR CHLORDIAZEPOXIDE HCL 5 MG.    DIRECTIONS: CHLORDIAZEPOXIDE HCL 5 MG, GIVE 1 CAP PO BID SCHEDULED.

## 2023-09-26 DIAGNOSIS — F32.0 MAJOR DEPRESSIVE DISORDER, SINGLE EPISODE, MILD: ICD-10-CM

## 2023-09-26 RX ORDER — CHLORDIAZEPOXIDE HYDROCHLORIDE 5 MG/1
5 CAPSULE, GELATIN COATED ORAL 2 TIMES DAILY
Qty: 60 CAPSULE | Refills: 5 | Status: SHIPPED | OUTPATIENT
Start: 2023-09-26

## 2023-09-26 NOTE — TELEPHONE ENCOUNTER
[facility] REQUESTING MED REFILL FOR CHLORDIAZEPOXIDE HCL 5 MG.    DIRECTIONS: CHLORDIAZEPOXIDE HCL 5 MG, 1 CAP PO BID.

## 2023-10-16 ENCOUNTER — NURSING HOME (OUTPATIENT)
Dept: FAMILY MEDICINE CLINIC | Facility: CLINIC | Age: 88
End: 2023-10-16
Payer: MEDICARE

## 2023-10-16 DIAGNOSIS — Z78.9 IMPAIRED MOBILITY AND ADLS: ICD-10-CM

## 2023-10-16 DIAGNOSIS — Z74.09 IMPAIRED MOBILITY AND ADLS: ICD-10-CM

## 2023-10-16 DIAGNOSIS — I50.9 ACUTE ON CHRONIC CONGESTIVE HEART FAILURE, UNSPECIFIED HEART FAILURE TYPE: ICD-10-CM

## 2023-10-16 DIAGNOSIS — I10 ESSENTIAL (PRIMARY) HYPERTENSION: Primary | ICD-10-CM

## 2023-10-16 DIAGNOSIS — R60.0 BILATERAL EDEMA OF LOWER EXTREMITY: ICD-10-CM

## 2023-10-16 NOTE — LETTER
Nursing Home Follow Up Note      Bean Ag DO []   RAINA Koehler []    RAINA Blake [x]   [address] [place] [address]  Phone: [phone]  Fax: [phone] Radha Hopper MD []    Justino Weston DO []   [address] [place] [address]  Phone: [phone]  Fax: [phone]     PATIENT NAME: Breonna Gongora                                                                          YOB: 1935           DATE OF SERVICE: 10/16/2023  FACILITY:  [x][facility]   [] [facility]   [] [facility]   [] [facility]   [] Other ______________________________________________________________________      CHIEF COMPLAINT:  Follow up labs, fluid retention      HISTORY OF PRESENT ILLNESS:   Breonna Gongora is a 88 y.o. female who is being seen today for follow up of fluid retention and repeat CMP. At the time of visit today, patient is sitting in chair in room with legs dependent. Edema 2+. Patient denies SOA.     PAST MEDICAL & SURGICAL HISTORY:   Past Medical History:   Diagnosis Date    Allergic     Anemia     CHF (congestive heart failure)     Depression     Hypothyroidism     Paroxysmal A-fib     Stroke       Past Surgical History:   Procedure Laterality Date    APPENDECTOMY      CATARACT EXTRACTION N/A     TOTAL ABDOMINAL HYSTERECTOMY WITH SALPINGO OOPHORECTOMY           MEDICATIONS:  I have reviewed and reconciled the patients medication list in the patients chart at the skilled nursing facility today.      ALLERGIES:    Allergies   Allergen Reactions    Penicillins Unknown - High Severity    Sulfa Antibiotics Unknown - High Severity         SOCIAL HISTORY:    Social History     Socioeconomic History    Marital status: Unknown   Tobacco Use    Smoking status: Never    Smokeless tobacco: Never   Vaping Use    Vaping Use: Never used   Substance and Sexual Activity    Alcohol use: Never    Drug use: Never    Sexual activity: Defer       FAMILY HISTORY:    Family History   Problem  Relation Age of Onset    COPD Mother     Heart disease Father     Alcohol abuse Father        REVIEW OF SYSTEMS:    Review of Systems   Constitutional:  Negative for fatigue.   HENT:  Positive for hearing loss. Negative for ear discharge and ear pain.    Respiratory:  Negative for cough and shortness of breath.    Cardiovascular:  Positive for leg swelling. Negative for chest pain and palpitations.   Neurological:  Negative for headache and confusion.   Psychiatric/Behavioral:  Positive for sleep disturbance.    ROS supplemented by nursing staff and family.      PHYSICAL EXAMINATION:   VITAL SIGNS:   Vitals:    10/16/23 0800   BP: 139/78   Pulse: 67   Resp: 18   Temp: 97.5 °F (36.4 °C)   SpO2: 97%         Nursing notes and vital signs reviewed.   General Appearance:  Elderly female, sitting in wheelchair, NAD.    Ears: TM normal, no cerumen impaction noted.   Head: Round, mobile, soft, nontender nodule right forehead--approximately 1.5in in diameter   Eyes: PERRLA.  Conjunctivae and sclerae normal.    Neck: Normal range of motion. Neck supple. No JVD present.   Cardiovascular: Normal rate, regular rhythm.  Pulses palpable equal bilaterally.    Pulmonary/Chest: Effort normal and breath sounds normal. No respiratory distress.   Abdominal: Soft. Bowel sounds are normal. No distention or tenderness.     Musculoskeletal: Without obvious deformity. BLE edema 1-2+   Neurological: Awake.  Appears at baseline cognition.    Skin: Skin is warm and dry.    Psychiatric:  Normal mood and affect. Normal behavior        RECORDS REVIEW:   10/12/2023--BUN 17, Cr 0.8, GFR 68, Hgb 10.6, Hct 32.6    ASSESSMENT   Diagnoses and all orders for this visit:    1. Essential (primary) hypertension (Primary)    2. Acute on chronic congestive heart failure, unspecified heart failure type    3. Bilateral edema of lower extremity    4. Impaired mobility and ADLs          PLAN    --VS per facility policy  --assist patient with mobility and ADLs as  needed to promote safety and independence  --notify MD or myself of any acute changes in condition    [x]  Discussed Patient in detail with nursing/staff, addressed all needs today.     [x]  Plan of Care Reviewed   []  PT/OT Reviewed   []  Order Changes  []  Discharge Plans Reviewed  [x]  Advance Directive on file with Nursing Home.   [x]  POA on file with Nursing Home.   [x]  Code Status listed: []  Full Code   []  DNR         Arianne Mcintosh, RAINA  02/27/2023

## 2023-10-18 VITALS
RESPIRATION RATE: 18 BRPM | TEMPERATURE: 97.5 F | DIASTOLIC BLOOD PRESSURE: 78 MMHG | OXYGEN SATURATION: 97 % | SYSTOLIC BLOOD PRESSURE: 139 MMHG | HEART RATE: 67 BPM

## 2023-10-19 NOTE — PROGRESS NOTES
Nursing Home Follow Up Note      Bean Ag DO []   RAINA Koehler []    RAINA Blake [x]   [address] [place] [address]  Phone: [phone]  Fax: [phone] Radha Hopper MD []    Justino Weston DO []   [address] [place] [address]  Phone: [phone]  Fax: [phone]     PATIENT NAME: Breonna Gnogora                                                                          YOB: 1935           DATE OF SERVICE: 10/16/2023  FACILITY:  [x][facility]   [] [facility]   [] [facility]   [] [facility]   [] Other ______________________________________________________________________      CHIEF COMPLAINT:  Follow up labs, fluid retention      HISTORY OF PRESENT ILLNESS:   Breonna Gongora is a 88 y.o. female who is being seen today for follow up of fluid retention and repeat CMP. At the time of visit today, patient is sitting in chair in room with legs dependent. Edema 2+. Patient denies SOA.     PAST MEDICAL & SURGICAL HISTORY:   Past Medical History:   Diagnosis Date    Allergic     Anemia     CHF (congestive heart failure)     Depression     Hypothyroidism     Paroxysmal A-fib     Stroke       Past Surgical History:   Procedure Laterality Date    APPENDECTOMY      CATARACT EXTRACTION N/A     TOTAL ABDOMINAL HYSTERECTOMY WITH SALPINGO OOPHORECTOMY           MEDICATIONS:  I have reviewed and reconciled the patients medication list in the patients chart at the skilled nursing facility today.      ALLERGIES:    Allergies   Allergen Reactions    Penicillins Unknown - High Severity    Sulfa Antibiotics Unknown - High Severity         SOCIAL HISTORY:    Social History     Socioeconomic History    Marital status: Unknown   Tobacco Use    Smoking status: Never    Smokeless tobacco: Never   Vaping Use    Vaping Use: Never used   Substance and Sexual Activity    Alcohol use: Never    Drug use: Never    Sexual activity: Defer       FAMILY HISTORY:    Family History   Problem  Relation Age of Onset    COPD Mother     Heart disease Father     Alcohol abuse Father        REVIEW OF SYSTEMS:    Review of Systems   Constitutional:  Negative for fatigue.   HENT:  Positive for hearing loss. Negative for ear discharge and ear pain.    Respiratory:  Negative for cough and shortness of breath.    Cardiovascular:  Positive for leg swelling. Negative for chest pain and palpitations.   Neurological:  Negative for headache and confusion.   Psychiatric/Behavioral:  Positive for sleep disturbance.    ROS supplemented by nursing staff and family.      PHYSICAL EXAMINATION:   VITAL SIGNS:   Vitals:    10/16/23 0800   BP: 139/78   Pulse: 67   Resp: 18   Temp: 97.5 °F (36.4 °C)   SpO2: 97%         Nursing notes and vital signs reviewed.   General Appearance:  Elderly female, sitting in wheelchair, NAD.    Ears: TM normal, no cerumen impaction noted.   Head: Round, mobile, soft, nontender nodule right forehead--approximately 1.5in in diameter   Eyes: PERRLA.  Conjunctivae and sclerae normal.    Neck: Normal range of motion. Neck supple. No JVD present.   Cardiovascular: Normal rate, regular rhythm.  Pulses palpable equal bilaterally.    Pulmonary/Chest: Effort normal and breath sounds normal. No respiratory distress.   Abdominal: Soft. Bowel sounds are normal. No distention or tenderness.     Musculoskeletal: Without obvious deformity. BLE edema 1-2+   Neurological: Awake.  Appears at baseline cognition.    Skin: Skin is warm and dry.    Psychiatric:  Normal mood and affect. Normal behavior        RECORDS REVIEW:   10/12/2023--BUN 17, Cr 0.8, GFR 68, Hgb 10.6, Hct 32.6    ASSESSMENT   Diagnoses and all orders for this visit:    1. Essential (primary) hypertension (Primary)    2. Acute on chronic congestive heart failure, unspecified heart failure type    3. Bilateral edema of lower extremity    4. Impaired mobility and ADLs          PLAN    --VS per facility policy  --assist patient with mobility and ADLs as  needed to promote safety and independence  --notify MD or myself of any acute changes in condition    [x]  Discussed Patient in detail with nursing/staff, addressed all needs today.     [x]  Plan of Care Reviewed   []  PT/OT Reviewed   []  Order Changes  []  Discharge Plans Reviewed  [x]  Advance Directive on file with Nursing Home.   [x]  POA on file with Nursing Home.   [x]  Code Status listed: []  Full Code   []  DNR         Arianne Mcintosh, RAINA  02/27/2023

## 2023-10-22 VITALS
RESPIRATION RATE: 18 BRPM | HEART RATE: 58 BPM | DIASTOLIC BLOOD PRESSURE: 52 MMHG | SYSTOLIC BLOOD PRESSURE: 142 MMHG | TEMPERATURE: 97 F | OXYGEN SATURATION: 97 %

## 2023-10-23 NOTE — PROGRESS NOTES
Nursing Home Progress Note        Justino Weston DO   [address] [place] [address] Phone: [phone]  Fax: [phone]     PATIENT NAME: Breonna Gongora                                                                          YOB: 1935           DATE OF SERVICE: 10/24/2023  FACILITY:  [facility]  ______________________________________________________________________     CHIEF COMPLAINT:  Chronic Medical Management      HISTORY OF PRESENT ILLNESS:   Patient was resting comfortably in her bed on exam today.  Nurses complained that the patient was not taking insulin appropriately as she was frequently afraid to be below 200s at the risk of hypoglycemic symptoms (which she has not had).  Recent labs were reviewed and presented high TSH, glucose, and A1C.  Eliquis was never adjusted for geriatric dosing.  No issues with bleeding reported.     PAST MEDICAL & SURGICAL HISTORY:   Past Medical History:   Diagnosis Date    Allergic     Anemia     CHF (congestive heart failure)     Depression     Hypothyroidism     Paroxysmal A-fib     Stroke       Past Surgical History:   Procedure Laterality Date    APPENDECTOMY      CATARACT EXTRACTION N/A     TOTAL ABDOMINAL HYSTERECTOMY WITH SALPINGO OOPHORECTOMY           MEDICATIONS:  I have reviewed and reconciled the patients medication list in the patients chart at the skilled nursing facility today, 10/24/2023.      ALLERGIES:  Allergies   Allergen Reactions    Penicillins Unknown - High Severity    Sulfa Antibiotics Unknown - High Severity         SOCIAL HISTORY:  Social History     Socioeconomic History    Marital status: Unknown   Tobacco Use    Smoking status: Never    Smokeless tobacco: Never   Vaping Use    Vaping Use: Never used   Substance and Sexual Activity    Alcohol use: Never    Drug use: Never    Sexual activity: Defer       FAMILY HISTORY:  Family History   Problem Relation Age of Onset    COPD Mother     Heart disease Father     Alcohol  abuse Father        REVIEW OF SYSTEMS:  Review of Systems   Constitutional:  Negative for chills, fatigue and fever.   HENT:  Negative for congestion, ear pain, rhinorrhea, sinus pressure and sore throat.    Eyes:  Negative for visual disturbance.   Respiratory:  Negative for cough, chest tightness, shortness of breath and wheezing.    Cardiovascular:  Negative for chest pain, palpitations and leg swelling.   Gastrointestinal:  Negative for abdominal pain, blood in stool, constipation, diarrhea, nausea and vomiting.   Endocrine: Negative for polydipsia and polyuria.   Genitourinary:  Negative for dysuria and hematuria.   Musculoskeletal:  Negative for arthralgias and back pain.   Skin:  Negative for rash.   Neurological:  Negative for dizziness, light-headedness, numbness and headaches.   Psychiatric/Behavioral:  Negative for dysphoric mood and sleep disturbance. The patient is not nervous/anxious.           PHYSICAL EXAMINATION:     VITAL SIGNS:  /73   Pulse 68   Temp 97.2 °F (36.2 °C)   Resp 18   Wt 75.8 kg (167 lb)   SpO2 93%   BMI 32.61 kg/m²     Physical Exam  Vitals and nursing note reviewed.   Constitutional:       Appearance: Normal appearance. She is well-developed.      Comments: Frail elderly female in no acute distress   HENT:      Head: Normocephalic and atraumatic.      Nose: Nose normal.      Mouth/Throat:      Mouth: Mucous membranes are moist.      Pharynx: No oropharyngeal exudate.   Eyes:      General: No scleral icterus.     Extraocular Movements: Extraocular movements intact.      Conjunctiva/sclera: Conjunctivae normal.      Pupils: Pupils are equal, round, and reactive to light.   Neck:      Thyroid: No thyromegaly.   Cardiovascular:      Rate and Rhythm: Normal rate and regular rhythm.      Heart sounds: Murmur heard.      No friction rub. No gallop.   Pulmonary:      Effort: Pulmonary effort is normal. No respiratory distress.      Breath sounds: Normal breath sounds. No  wheezing.   Abdominal:      General: Bowel sounds are normal. There is no distension.      Palpations: Abdomen is soft.      Tenderness: There is no abdominal tenderness.   Musculoskeletal:         General: No deformity or signs of injury.      Cervical back: Normal range of motion and neck supple.   Lymphadenopathy:      Cervical: No cervical adenopathy.   Skin:     General: Skin is warm and dry.      Findings: No rash.   Neurological:      General: No focal deficit present.      Mental Status: She is alert and oriented to person, place, and time.   Psychiatric:         Mood and Affect: Mood normal.         Behavior: Behavior normal.       RECORDS REVIEW:   Labs 10/16/23 hb 11.6, TSH 5.6, A1C 8.4     ASSESSMENT     Diagnoses and all orders for this visit:    1. Type 2 diabetes mellitus with hyperglycemia, with long-term current use of insulin (Primary)    2. Hypothyroidism, unspecified type    3. Anxiety and depression    4. Coronary artery disease involving native heart without angina pectoris, unspecified vessel or lesion type    5. Essential hypertension    6. Major depressive disorder, single episode, mild    7. Anxiety    8. Essential (primary) hypertension    9. Systolic congestive heart failure, unspecified HF chronicity        PLAN  Type 2 diabetes mellitus  - Glucose has been running high due to fears of going below 200.  Patient counselled that it is ok to be closer to 150s.  Levemir dose increased to 25U QHS.     Hypothyroidism  - recent TSH was mildly elevated.  Synthroid dose was increased to 100mcg.      CAD/diastolic congestive heart failure  - Currently euvolemic but due to episodes of exacerbations, patient is currently on weekly weights with as needed Lasix.  - Stable on current cardiac regimen although she has been frequently noncompliant with her fluid regimen.     Atrial fibrillation  - Rate is controlled.  Continue metoprolol .  Eliquis dose was changed to 2.5mgBID for geriatric dosing.      Pulmonary hypertension  - Severe disease noted during hospitalization.  Continue supportive care.    Anxiety and depression  - Stable on mirtazapine and lorazepam.  Psychiatry is following in the facility.    Hypertension  - Stable on current medications.        [x]  Discussed Patient in detail with nursing/staff, addressed all needs today.     [x]  Plan of Care Reviewed   [x]  PT/OT Reviewed   [x]  Order Changes  []  Discharge Plans Reviewed  [x]  Advance Directive on file with Nursing Home.   [x]  POA on file with Nursing Home.    [x]  Code Status listed and reviewed.     I confirm accuracy of unchanged data/findings including physical exam and plan which have been carried forward from previous visit, as well as I have updated appropriately those that have changed        Justino Weston DO.  10/30/2023

## 2023-10-23 NOTE — PROGRESS NOTES
"Nursing Home Follow Up Note      Bean Ag DO []   RAINA Koehler []    RAINA Blake [x]   [address] [place] [address]  Phone: [phone]  Fax: [phone] Radha Hopper MD []    Justino Weston DO []   [address] [place] [address]  Phone: [phone]  Fax: [phone]     PATIENT NAME: Breonna Gongora                                                                          YOB: 1935           DATE OF SERVICE: 07/10/2023  FACILITY:  [x][facility]   [] [facility]   [] [facility]   [] [facility]   [] Other ______________________________________________________________________      CHIEF COMPLAINT:  Productive cough, hoarseness      HISTORY OF PRESENT ILLNESS:   Breonna Gongora is a 88 y.o. female who is being seen today at the request of the nursing staff for productive cough and hoarseness. Patient reports onset \"several days\" ago. Reports production of thick, green sputum and mucous as well as associated headache and congestion. Denies fever, dizziness, N/V/D. At the time of visit today, patient is sitting in chair in her room.     PAST MEDICAL & SURGICAL HISTORY:   Past Medical History:   Diagnosis Date    Allergic     Anemia     CHF (congestive heart failure)     Depression     Hypothyroidism     Paroxysmal A-fib     Stroke       Past Surgical History:   Procedure Laterality Date    APPENDECTOMY      CATARACT EXTRACTION N/A     TOTAL ABDOMINAL HYSTERECTOMY WITH SALPINGO OOPHORECTOMY           MEDICATIONS:  I have reviewed and reconciled the patients medication list in the patients chart at the skilled nursing facility today.      ALLERGIES:    Allergies   Allergen Reactions    Penicillins Unknown - High Severity    Sulfa Antibiotics Unknown - High Severity         SOCIAL HISTORY:    Social History     Socioeconomic History    Marital status: Unknown   Tobacco Use    Smoking status: Never    Smokeless tobacco: Never   Vaping Use    Vaping Use: Never used "   Substance and Sexual Activity    Alcohol use: Never    Drug use: Never    Sexual activity: Defer       FAMILY HISTORY:    Family History   Problem Relation Age of Onset    COPD Mother     Heart disease Father     Alcohol abuse Father        REVIEW OF SYSTEMS:    Review of Systems   Constitutional:  Negative for fatigue.   HENT:  Positive for congestion, hearing loss, sinus pressure and voice change. Negative for ear discharge, ear pain and sore throat.    Respiratory:  Positive for cough. Negative for shortness of breath.    Cardiovascular:  Negative for chest pain, palpitations and leg swelling.   Gastrointestinal:  Negative for diarrhea, nausea and vomiting.   Skin:  Positive for skin lesions (round, nodule right forehead).   Neurological:  Negative for headache and confusion.   Psychiatric/Behavioral:  Positive for sleep disturbance.    ROS supplemented by nursing staff.      PHYSICAL EXAMINATION:   VITAL SIGNS:   Vitals:    07/10/23 0800   BP: 142/52   Pulse: 58   Resp: 18   Temp: 97 °F (36.1 °C)   SpO2: 97%         Nursing notes and vital signs reviewed.   General Appearance:  Elderly female, sitting in wheelchair, NAD.    Ears: External ears normal.   Head: Normal, atraumatic.   Eyes: PERRLA.  Conjunctivae and sclerae normal.    Neck: Normal range of motion. Neck supple. No JVD present.   Cardiovascular: Normal rate, regular rhythm.  Pulses palpable equal bilaterally.    Pulmonary/Chest: Effort normal and breath sounds normal. No respiratory distress.   Abdominal: Soft. Bowel sounds are normal. No distention or tenderness.     Musculoskeletal: Without obvious deformity.    Neurological: Awake.  Appears at baseline cognition.    Skin: Skin is warm and dry.    Psychiatric:  Normal mood and affect. Normal behavior        RECORDS REVIEW:    progress notes, VS    ASSESSMENT   Diagnoses and all orders for this visit:    1. Acute URI (Primary)    2. Productive cough          PLAN  --azithromycin 500mg by mouth once  daily on day 1, then 250mg by mouth once daily days 2-5  --Mucinex-DM 1200mg by mouth twice daily x 7 days  --VS per facility policy  --assist patient with mobility and ADLs as needed to promote safety and independence  --notify MD or myself of any acute changes in condition    [x]  Discussed Patient in detail with nursing/staff, addressed all needs today.     [x]  Plan of Care Reviewed   []  PT/OT Reviewed   []  Order Changes  []  Discharge Plans Reviewed  [x]  Advance Directive on file with Nursing Home.   [x]  POA on file with Nursing Home.   [x]  Code Status listed: []  Full Code   []  DNR         Arianne Mcintosh, RAINA  07/10/2023

## 2023-10-24 ENCOUNTER — NURSING HOME (OUTPATIENT)
Dept: INTERNAL MEDICINE | Facility: CLINIC | Age: 88
End: 2023-10-24
Payer: MEDICARE

## 2023-10-24 VITALS
DIASTOLIC BLOOD PRESSURE: 73 MMHG | WEIGHT: 167 LBS | TEMPERATURE: 97.2 F | OXYGEN SATURATION: 93 % | HEART RATE: 68 BPM | SYSTOLIC BLOOD PRESSURE: 139 MMHG | BODY MASS INDEX: 32.61 KG/M2 | RESPIRATION RATE: 18 BRPM

## 2023-10-24 DIAGNOSIS — E11.65 TYPE 2 DIABETES MELLITUS WITH HYPERGLYCEMIA, WITH LONG-TERM CURRENT USE OF INSULIN: Primary | ICD-10-CM

## 2023-10-24 DIAGNOSIS — I10 ESSENTIAL HYPERTENSION: ICD-10-CM

## 2023-10-24 DIAGNOSIS — E03.9 HYPOTHYROIDISM, UNSPECIFIED TYPE: ICD-10-CM

## 2023-10-24 DIAGNOSIS — I50.20 SYSTOLIC CONGESTIVE HEART FAILURE, UNSPECIFIED HF CHRONICITY: ICD-10-CM

## 2023-10-24 DIAGNOSIS — Z79.4 TYPE 2 DIABETES MELLITUS WITH HYPERGLYCEMIA, WITH LONG-TERM CURRENT USE OF INSULIN: Primary | ICD-10-CM

## 2023-10-24 DIAGNOSIS — F32.A ANXIETY AND DEPRESSION: ICD-10-CM

## 2023-10-24 DIAGNOSIS — F41.9 ANXIETY AND DEPRESSION: ICD-10-CM

## 2023-10-24 DIAGNOSIS — F41.9 ANXIETY: ICD-10-CM

## 2023-10-24 DIAGNOSIS — F32.0 MAJOR DEPRESSIVE DISORDER, SINGLE EPISODE, MILD: ICD-10-CM

## 2023-10-24 DIAGNOSIS — I10 ESSENTIAL (PRIMARY) HYPERTENSION: ICD-10-CM

## 2023-10-24 DIAGNOSIS — I25.10 CORONARY ARTERY DISEASE INVOLVING NATIVE HEART WITHOUT ANGINA PECTORIS, UNSPECIFIED VESSEL OR LESION TYPE: ICD-10-CM

## 2023-10-24 NOTE — LETTER
Nursing Home Progress Note        Justino Weston DO   [address] [place] [address] Phone: [phone]  Fax: [phone]     PATIENT NAME: Breonna Gongora                                                                          YOB: 1935           DATE OF SERVICE: 10/24/2023  FACILITY:  [facility]  ______________________________________________________________________     CHIEF COMPLAINT:  Chronic Medical Management      HISTORY OF PRESENT ILLNESS:   ***    PAST MEDICAL & SURGICAL HISTORY:   Past Medical History:   Diagnosis Date    Allergic     Anemia     CHF (congestive heart failure)     Depression     Hypothyroidism     Paroxysmal A-fib     Stroke       Past Surgical History:   Procedure Laterality Date    APPENDECTOMY      CATARACT EXTRACTION N/A     TOTAL ABDOMINAL HYSTERECTOMY WITH SALPINGO OOPHORECTOMY           MEDICATIONS:  I have reviewed and reconciled the patients medication list in the patients chart at the skilled nursing facility today, 10/24/2023.      ALLERGIES:  Allergies   Allergen Reactions    Penicillins Unknown - High Severity    Sulfa Antibiotics Unknown - High Severity         SOCIAL HISTORY:  Social History     Socioeconomic History    Marital status: Unknown   Tobacco Use    Smoking status: Never    Smokeless tobacco: Never   Vaping Use    Vaping Use: Never used   Substance and Sexual Activity    Alcohol use: Never    Drug use: Never    Sexual activity: Defer       FAMILY HISTORY:  Family History   Problem Relation Age of Onset    COPD Mother     Heart disease Father     Alcohol abuse Father        REVIEW OF SYSTEMS:  Review of Systems   Constitutional:  Negative for chills, fatigue and fever.   HENT:  Negative for congestion, ear pain, rhinorrhea, sinus pressure and sore throat.    Eyes:  Negative for visual disturbance.   Respiratory:  Negative for cough, chest tightness, shortness of breath and wheezing.    Cardiovascular:  Negative for chest pain,  palpitations and leg swelling.   Gastrointestinal:  Negative for abdominal pain, blood in stool, constipation, diarrhea, nausea and vomiting.   Endocrine: Negative for polydipsia and polyuria.   Genitourinary:  Negative for dysuria and hematuria.   Musculoskeletal:  Negative for arthralgias and back pain.   Skin:  Negative for rash.   Neurological:  Negative for dizziness, light-headedness, numbness and headaches.   Psychiatric/Behavioral:  Negative for dysphoric mood and sleep disturbance. The patient is not nervous/anxious.         PHYSICAL EXAMINATION:     VITAL SIGNS:  There were no vitals taken for this visit.    Physical Exam  Vitals and nursing note reviewed.   Constitutional:       Appearance: Normal appearance. She is well-developed.      Comments: Frail elderly female in no acute distress   HENT:      Head: Normocephalic and atraumatic.      Nose: Nose normal.      Mouth/Throat:      Mouth: Mucous membranes are moist.      Pharynx: No oropharyngeal exudate.   Eyes:      General: No scleral icterus.     Extraocular Movements: Extraocular movements intact.      Conjunctiva/sclera: Conjunctivae normal.      Pupils: Pupils are equal, round, and reactive to light.   Neck:      Thyroid: No thyromegaly.   Cardiovascular:      Rate and Rhythm: Normal rate and regular rhythm.      Heart sounds: Murmur heard.      No friction rub. No gallop.   Pulmonary:      Effort: Pulmonary effort is normal. No respiratory distress.      Breath sounds: Normal breath sounds. No wheezing.   Abdominal:      General: Bowel sounds are normal. There is no distension.      Palpations: Abdomen is soft.      Tenderness: There is no abdominal tenderness.   Musculoskeletal:         General: No deformity or signs of injury.      Cervical back: Normal range of motion and neck supple.   Lymphadenopathy:      Cervical: No cervical adenopathy.   Skin:     General: Skin is warm and dry.      Findings: No rash.   Neurological:      General: No  focal deficit present.      Mental Status: She is alert and oriented to person, place, and time.   Psychiatric:         Mood and Affect: Mood normal.         Behavior: Behavior normal.     RECORDS REVIEW:   ***     ASSESSMENT     There are no diagnoses linked to this encounter.    PLAN  Type 2 diabetes mellitus  - Glucose has been well controlled in the facility.  Check CBC, CMP, TSH, and A1c every 3 months.  Labs were clarified today.    CAD/diastolic congestive heart failure  - Currently euvolemic but due to episodes of exacerbations, patient is currently on weekly weights with as needed Lasix.  - Stable on current cardiac regimen at this time.    Atrial fibrillation  - Rate is controlled.  Continue metoprolol and Eliquis.    Hypothyroidism  - Continue monitoring TSH every 3 months.  Stable on current regimen of Synthroid.    Pulmonary hypertension  - Severe disease noted during hospitalization.  Continue supportive care.    Anxiety and depression  - Stable on mirtazapine and lorazepam.  Psychiatry is following in the facility.    Hypertension  - Stable on current medications.          [x]  Discussed Patient in detail with nursing/staff, addressed all needs today.     [x]  Plan of Care Reviewed   []  PT/OT Reviewed   []  Order Changes  []  Discharge Plans Reviewed  [x]  Advance Directive on file with Nursing Home.   [x]  POA on file with Nursing Home.    [x]  Code Status listed and reviewed.     I confirm accuracy of unchanged data/findings including physical exam and plan which have been carried forward from previous visit, as well as I have updated appropriately those that have changed        Belia May MA.  10/23/2023

## 2023-10-31 DIAGNOSIS — F41.9 ANXIETY: ICD-10-CM

## 2023-10-31 RX ORDER — LORAZEPAM 1 MG/1
1 TABLET ORAL NIGHTLY
Qty: 30 TABLET | Refills: 5 | Status: SHIPPED | OUTPATIENT
Start: 2023-10-31

## 2023-11-03 ENCOUNTER — NURSING HOME (OUTPATIENT)
Dept: FAMILY MEDICINE CLINIC | Facility: CLINIC | Age: 88
End: 2023-11-03
Payer: MEDICARE

## 2023-11-03 VITALS
RESPIRATION RATE: 18 BRPM | DIASTOLIC BLOOD PRESSURE: 82 MMHG | TEMPERATURE: 97.3 F | BODY MASS INDEX: 32.85 KG/M2 | HEART RATE: 64 BPM | OXYGEN SATURATION: 97 % | WEIGHT: 168.2 LBS | SYSTOLIC BLOOD PRESSURE: 152 MMHG

## 2023-11-03 DIAGNOSIS — Z91.199 NON-COMPLIANCE: ICD-10-CM

## 2023-11-03 DIAGNOSIS — Z79.899 MEDICATION MANAGEMENT: ICD-10-CM

## 2023-11-03 DIAGNOSIS — R60.0 BILATERAL LOWER EXTREMITY EDEMA: ICD-10-CM

## 2023-11-03 DIAGNOSIS — J96.10 CHRONIC RESPIRATORY FAILURE REQUIRING TREATMENT WITH NOCTURNAL BPAP BY MASK: ICD-10-CM

## 2023-11-03 DIAGNOSIS — I50.33 ACUTE ON CHRONIC HEART FAILURE WITH PRESERVED EJECTION FRACTION (HFPEF): Primary | ICD-10-CM

## 2023-11-03 DIAGNOSIS — Z99.89 CHRONIC RESPIRATORY FAILURE REQUIRING TREATMENT WITH NOCTURNAL BPAP BY MASK: ICD-10-CM

## 2023-11-03 NOTE — LETTER
Nursing Home Follow Up Note      Bean Ag DO []   RAINA Koehler [x]  [address] [place] [address]  Phone: [phone]  Fax: [phone] Radha Hopper MD []    Justino Weston DO []   [address] [place] [address]  Phone: [phone]  Fax: [phone]     PATIENT NAME: Breonna Gongora                                                                          YOB: 1935           DATE OF SERVICE: 11/03/2023  FACILITY:  [x][facility]   [] [facility]   [] [facility]   [] [facility]   [] Other ______________________________________________________________________      CHIEF COMPLAINT:    Follow up BLE edema and abnormal labs.       HISTORY OF PRESENT ILLNESS:     History of CHF with frequent exacerbation secondary to noncompliance with BiPAP.  She has been on Lasix daily recently increased to twice daily for 7 days.  She has had some slight improvement in the edema but not a lot.  She had labs a couple days ago and TSH 5.9 and .  She does not have edema elsewhere besides her lower extremities and does feel the shortness of breath has improved some with increase in the Lasix.  She has not been compliant with her BiPAP for some time now, it makes her very anxious.  Several different types of masks including the nasal mask, have been tried but she feels smothered with each of them and they called her to have an anxiety attack.  No other complaints at this time.    PAST MEDICAL & SURGICAL HISTORY:   Past Medical History:   Diagnosis Date    Allergic     Anemia     CHF (congestive heart failure)     Depression     Hypothyroidism     Paroxysmal A-fib     Stroke       Past Surgical History:   Procedure Laterality Date    APPENDECTOMY      CATARACT EXTRACTION N/A     TOTAL ABDOMINAL HYSTERECTOMY WITH SALPINGO OOPHORECTOMY           MEDICATIONS:  I have reviewed and reconciled the patients medication list in the patients chart at the skilled nursing facility today.       ALLERGIES:    Allergies   Allergen Reactions    Penicillins Unknown - High Severity    Sulfa Antibiotics Unknown - High Severity         SOCIAL HISTORY:    Social History     Socioeconomic History    Marital status: Unknown   Tobacco Use    Smoking status: Never    Smokeless tobacco: Never   Vaping Use    Vaping Use: Never used   Substance and Sexual Activity    Alcohol use: Never    Drug use: Never    Sexual activity: Defer       FAMILY HISTORY:    Family History   Problem Relation Age of Onset    COPD Mother     Heart disease Father     Alcohol abuse Father        REVIEW OF SYSTEMS:    Review of Systems   Constitutional:  Negative for activity change, appetite change, chills, diaphoresis, fatigue, fever, unexpected weight gain and unexpected weight loss.   HENT:  Negative for congestion, mouth sores, nosebleeds, sore throat, swollen glands and trouble swallowing.    Respiratory:  Positive for apnea and shortness of breath. Negative for cough, choking, chest tightness and wheezing.    Cardiovascular:  Positive for leg swelling. Negative for chest pain and palpitations.   Gastrointestinal:  Negative for abdominal distention, abdominal pain, blood in stool, constipation, diarrhea, nausea, vomiting and indigestion.   Genitourinary:  Negative for decreased urine volume, difficulty urinating, dysuria, flank pain, frequency, hematuria, urgency and urinary incontinence.   Musculoskeletal:  Positive for arthralgias (chronic). Negative for back pain and myalgias.   Skin:  Negative for color change and rash.   Neurological:  Positive for weakness and memory problem. Negative for dizziness and confusion.   Psychiatric/Behavioral:  Negative for behavioral problems, dysphoric mood, sleep disturbance and depressed mood. The patient is not nervous/anxious.          PHYSICAL EXAMINATION:   VITAL SIGNS:   Vitals:    11/03/23 1027   BP: 152/82   Pulse: 64   Resp: 18   Temp: 97.3 °F (36.3 °C)   SpO2: 97%   Weight: 76.3 kg (168 lb  3.2 oz)        Physical Exam  Vitals reviewed.   Constitutional:       Appearance: Normal appearance.   Cardiovascular:      Rate and Rhythm: Normal rate and regular rhythm.   Pulmonary:      Effort: Pulmonary effort is normal.      Breath sounds: Normal breath sounds. No wheezing, rhonchi or rales.   Musculoskeletal:      Right lower le+ Edema present.      Left lower le+ Edema present.   Neurological:      Mental Status: She is alert. Mental status is at baseline.   Psychiatric:         Mood and Affect: Mood and affect normal.         Speech: Speech normal.         Cognition and Memory: Memory is impaired.         RECORDS REVIEW:   I have reviewed records in [facility]    ASSESSMENT     Diagnoses and all orders for this visit:    1. Acute on chronic heart failure with preserved ejection fraction (HFpEF) (Primary)    2. Bilateral lower extremity edema    3. Non-compliance    4. Chronic respiratory failure requiring treatment with nocturnal BPAP by mask    5. Medication management        PLAN    Acute on chronic CHF/CRF/non compliance/BLE edema  -Will stop Lasix and start Bumex 2 mg p.o. twice daily for 3 days then 2 mg daily.  Check BMP Tuesday.  She is on potassium daily.  Continues to be encouraged about compliance with BiPAP.    Thyroidism  -TSH 5.9 and was elevated last time as well.  Will increase levothyroxine to 125 mcg daily and check TSH, T3 and T4 in 4 weeks.    Will follow-up with results and continue to monitor.    Patient and nursing encouraged to keep me informed of any acute changes, lack of improvement, or any new concerning symptoms.     Continue supportive care for all ADLs.        [x]  Discussed Patient in detail with nursing/staff, addressed all needs today.     [x]  Plan of Care Reviewed   []  PT/OT Reviewed   [x]  Order Changes  []  Discharge Plans Reviewed  [x]  Advance Directive on file with Nursing Home.   [x]  POA on file with Nursing Home.   [x]  Code Status listed: []  Full Code   []   DNR        I spent 35 minutes caring for Breonna on this date of service. This time includes time spent by me in the following activities:preparing for the visit, reviewing tests, obtaining and/or reviewing a separately obtained history, performing a medically appropriate examination and/or evaluation , counseling and educating the patient/family/caregiver, ordering medications, tests, or procedures, referring and communicating with other health care professionals , documenting information in the medical record, independently interpreting results and communicating that information with the patient/family/caregiver, and care coordination       Myesha Ann APRN.

## 2023-11-05 NOTE — PROGRESS NOTES
Nursing Home Follow Up Note      Bean Ag DO []   RAINA Koehler [x]  [address] [place] [address]  Phone: [phone]  Fax: [phone] Radha Hopper MD []    Justino Weston DO []   [address] [place] [address]  Phone: [phone]  Fax: [phone]     PATIENT NAME: Breonna Gongora                                                                          YOB: 1935           DATE OF SERVICE: 11/03/2023  FACILITY:  [x][facility]   [] [facility]   [] [facility]   [] [facility]   [] Other ______________________________________________________________________      CHIEF COMPLAINT:    Follow up BLE edema and abnormal labs.       HISTORY OF PRESENT ILLNESS:     History of CHF with frequent exacerbation secondary to noncompliance with BiPAP.  She has been on Lasix daily recently increased to twice daily for 7 days.  She has had some slight improvement in the edema but not a lot.  She had labs a couple days ago and TSH 5.9 and .  She does not have edema elsewhere besides her lower extremities and does feel the shortness of breath has improved some with increase in the Lasix.  She has not been compliant with her BiPAP for some time now, it makes her very anxious.  Several different types of masks including the nasal mask, have been tried but she feels smothered with each of them and they called her to have an anxiety attack.  No other complaints at this time.    PAST MEDICAL & SURGICAL HISTORY:   Past Medical History:   Diagnosis Date    Allergic     Anemia     CHF (congestive heart failure)     Depression     Hypothyroidism     Paroxysmal A-fib     Stroke       Past Surgical History:   Procedure Laterality Date    APPENDECTOMY      CATARACT EXTRACTION N/A     TOTAL ABDOMINAL HYSTERECTOMY WITH SALPINGO OOPHORECTOMY           MEDICATIONS:  I have reviewed and reconciled the patients medication list in the patients chart at the skilled nursing facility today.       ALLERGIES:    Allergies   Allergen Reactions    Penicillins Unknown - High Severity    Sulfa Antibiotics Unknown - High Severity         SOCIAL HISTORY:    Social History     Socioeconomic History    Marital status: Unknown   Tobacco Use    Smoking status: Never    Smokeless tobacco: Never   Vaping Use    Vaping Use: Never used   Substance and Sexual Activity    Alcohol use: Never    Drug use: Never    Sexual activity: Defer       FAMILY HISTORY:    Family History   Problem Relation Age of Onset    COPD Mother     Heart disease Father     Alcohol abuse Father        REVIEW OF SYSTEMS:    Review of Systems   Constitutional:  Negative for activity change, appetite change, chills, diaphoresis, fatigue, fever, unexpected weight gain and unexpected weight loss.   HENT:  Negative for congestion, mouth sores, nosebleeds, sore throat, swollen glands and trouble swallowing.    Respiratory:  Positive for apnea and shortness of breath. Negative for cough, choking, chest tightness and wheezing.    Cardiovascular:  Positive for leg swelling. Negative for chest pain and palpitations.   Gastrointestinal:  Negative for abdominal distention, abdominal pain, blood in stool, constipation, diarrhea, nausea, vomiting and indigestion.   Genitourinary:  Negative for decreased urine volume, difficulty urinating, dysuria, flank pain, frequency, hematuria, urgency and urinary incontinence.   Musculoskeletal:  Positive for arthralgias (chronic). Negative for back pain and myalgias.   Skin:  Negative for color change and rash.   Neurological:  Positive for weakness and memory problem. Negative for dizziness and confusion.   Psychiatric/Behavioral:  Negative for behavioral problems, dysphoric mood, sleep disturbance and depressed mood. The patient is not nervous/anxious.          PHYSICAL EXAMINATION:   VITAL SIGNS:   Vitals:    11/03/23 1027   BP: 152/82   Pulse: 64   Resp: 18   Temp: 97.3 °F (36.3 °C)   SpO2: 97%   Weight: 76.3 kg (168 lb  3.2 oz)        Physical Exam  Vitals reviewed.   Constitutional:       Appearance: Normal appearance.   Cardiovascular:      Rate and Rhythm: Normal rate and regular rhythm.   Pulmonary:      Effort: Pulmonary effort is normal.      Breath sounds: Normal breath sounds. No wheezing, rhonchi or rales.   Musculoskeletal:      Right lower le+ Edema present.      Left lower le+ Edema present.   Neurological:      Mental Status: She is alert. Mental status is at baseline.   Psychiatric:         Mood and Affect: Mood and affect normal.         Speech: Speech normal.         Cognition and Memory: Memory is impaired.         RECORDS REVIEW:   I have reviewed records in [facility]    ASSESSMENT     Diagnoses and all orders for this visit:    1. Acute on chronic heart failure with preserved ejection fraction (HFpEF) (Primary)    2. Bilateral lower extremity edema    3. Non-compliance    4. Chronic respiratory failure requiring treatment with nocturnal BPAP by mask    5. Medication management        PLAN    Acute on chronic CHF/CRF/non compliance/BLE edema  -Will stop Lasix and start Bumex 2 mg p.o. twice daily for 3 days then 2 mg daily.  Check BMP Tuesday.  She is on potassium daily.  Continues to be encouraged about compliance with BiPAP.    Thyroidism  -TSH 5.9 and was elevated last time as well.  Will increase levothyroxine to 125 mcg daily and check TSH, T3 and T4 in 4 weeks.    Will follow-up with results and continue to monitor.    Patient and nursing encouraged to keep me informed of any acute changes, lack of improvement, or any new concerning symptoms.     Continue supportive care for all ADLs.        [x]  Discussed Patient in detail with nursing/staff, addressed all needs today.     [x]  Plan of Care Reviewed   []  PT/OT Reviewed   [x]  Order Changes  []  Discharge Plans Reviewed  [x]  Advance Directive on file with Nursing Home.   [x]  POA on file with Nursing Home.   [x]  Code Status listed: []  Full Code   []   DNR        I spent 35 minutes caring for Breonna on this date of service. This time includes time spent by me in the following activities:preparing for the visit, reviewing tests, obtaining and/or reviewing a separately obtained history, performing a medically appropriate examination and/or evaluation , counseling and educating the patient/family/caregiver, ordering medications, tests, or procedures, referring and communicating with other health care professionals , documenting information in the medical record, independently interpreting results and communicating that information with the patient/family/caregiver, and care coordination       Myesha Ann APRN.

## 2023-11-06 ENCOUNTER — NURSING HOME (OUTPATIENT)
Dept: FAMILY MEDICINE CLINIC | Facility: CLINIC | Age: 88
End: 2023-11-06
Payer: MEDICARE

## 2023-11-06 VITALS
RESPIRATION RATE: 18 BRPM | HEART RATE: 64 BPM | WEIGHT: 168.2 LBS | TEMPERATURE: 98.1 F | OXYGEN SATURATION: 96 % | BODY MASS INDEX: 32.85 KG/M2 | SYSTOLIC BLOOD PRESSURE: 116 MMHG | DIASTOLIC BLOOD PRESSURE: 60 MMHG

## 2023-11-06 DIAGNOSIS — Z91.199 NON-COMPLIANCE: ICD-10-CM

## 2023-11-06 DIAGNOSIS — I50.33 ACUTE ON CHRONIC HEART FAILURE WITH PRESERVED EJECTION FRACTION (HFPEF): Primary | ICD-10-CM

## 2023-11-06 DIAGNOSIS — R60.0 BILATERAL LOWER EXTREMITY EDEMA: ICD-10-CM

## 2023-11-06 DIAGNOSIS — Z74.09 IMPAIRED MOBILITY AND ADLS: ICD-10-CM

## 2023-11-06 DIAGNOSIS — Z78.9 IMPAIRED MOBILITY AND ADLS: ICD-10-CM

## 2023-11-06 PROBLEM — I50.9 ACUTE ON CHRONIC CONGESTIVE HEART FAILURE, UNSPECIFIED HEART FAILURE TYPE: Status: RESOLVED | Noted: 2023-05-11 | Resolved: 2023-11-06

## 2023-11-06 PROBLEM — I50.9 ACUTE EXACERBATION OF CHF (CONGESTIVE HEART FAILURE): Status: RESOLVED | Noted: 2023-07-13 | Resolved: 2023-11-06

## 2023-11-06 PROCEDURE — 99308 SBSQ NF CARE LOW MDM 20: CPT | Performed by: NURSE PRACTITIONER

## 2023-11-06 NOTE — LETTER
Nursing Home Follow Up Note      Bean Ag DO []   RAINA Koehler [x]  [address] [place] [address]  Phone: [phone]  Fax: [phone] Radha Hopper MD []    Justino Weston DO []   [address] [place] [address]  Phone: [phone]  Fax: [phone]     PATIENT NAME: Breonna Gongora                                                                          YOB: 1935           DATE OF SERVICE: 11/06/2023  FACILITY:  [x][facility]   [] [facility]   [] [facility]   [] [facility]   [] Other ______________________________________________________________________      CHIEF COMPLAINT:    Follow up acute CHF exacerbation.       HISTORY OF PRESENT ILLNESS:     History of CHF with frequent exacerbation secondary to noncompliance with BiPAP.  Lasix was changed to Bumex last week.  Lower extremity edema much improved and she reports that she is much less short of breath as well.  Twice daily dose of Bumex will complete today.  She will continue on daily Bumex.  Labs obtained this morning and are pending.  No other complaints and reports she is doing well otherwise. Continues to be noncompliant with BiPAP.    PAST MEDICAL & SURGICAL HISTORY:   Past Medical History:   Diagnosis Date    Allergic     Anemia     CHF (congestive heart failure)     Depression     Hypothyroidism     Paroxysmal A-fib     Stroke       Past Surgical History:   Procedure Laterality Date    APPENDECTOMY      CATARACT EXTRACTION N/A     TOTAL ABDOMINAL HYSTERECTOMY WITH SALPINGO OOPHORECTOMY           MEDICATIONS:  I have reviewed and reconciled the patients medication list in the patients chart at the skilled nursing facility today.      ALLERGIES:    Allergies   Allergen Reactions    Penicillins Unknown - High Severity    Sulfa Antibiotics Unknown - High Severity         SOCIAL HISTORY:    Social History     Socioeconomic History    Marital status: Unknown   Tobacco Use    Smoking status: Never    Smokeless  tobacco: Never   Vaping Use    Vaping Use: Never used   Substance and Sexual Activity    Alcohol use: Never    Drug use: Never    Sexual activity: Defer       FAMILY HISTORY:    Family History   Problem Relation Age of Onset    COPD Mother     Heart disease Father     Alcohol abuse Father        REVIEW OF SYSTEMS:    Review of Systems   Constitutional:  Negative for activity change, appetite change, chills, diaphoresis, fatigue, fever, unexpected weight gain and unexpected weight loss.   HENT:  Negative for congestion, mouth sores, nosebleeds and trouble swallowing.    Respiratory:  Positive for apnea (sleep apnea). Negative for cough, choking, chest tightness, shortness of breath (much improved) and wheezing.    Cardiovascular:  Positive for leg swelling (improved). Negative for chest pain and palpitations.   Gastrointestinal:  Negative for abdominal distention, abdominal pain, blood in stool, constipation, diarrhea, nausea, vomiting and indigestion.   Genitourinary:  Positive for frequency (due to diuretic). Negative for decreased urine volume, difficulty urinating, dysuria, flank pain, hematuria, urgency and urinary incontinence.   Musculoskeletal:  Positive for arthralgias (chronic). Negative for back pain and myalgias.   Skin:  Negative for color change and rash.   Neurological:  Positive for weakness and memory problem. Negative for dizziness and confusion.   Psychiatric/Behavioral:  Negative for behavioral problems, dysphoric mood, sleep disturbance and depressed mood. The patient is not nervous/anxious.          PHYSICAL EXAMINATION:   VITAL SIGNS:   Vitals:    11/06/23 1146   BP: 116/60   Pulse: 64   Resp: 18   Temp: 98.1 °F (36.7 °C)   SpO2: 96%   Weight: 76.3 kg (168 lb 3.2 oz)        Physical Exam  Vitals reviewed.   Constitutional:       Appearance: Normal appearance.   Cardiovascular:      Rate and Rhythm: Normal rate and regular rhythm.      Comments: Trace edema BLE but much improved  Pulmonary:       Effort: Pulmonary effort is normal.      Breath sounds: Normal breath sounds. No wheezing, rhonchi or rales.   Musculoskeletal:      Right lower leg: Edema present.      Left lower leg: Edema present.   Neurological:      Mental Status: She is alert. Mental status is at baseline.   Psychiatric:         Mood and Affect: Mood and affect normal.         Speech: Speech normal.         Cognition and Memory: Memory is impaired.         RECORDS REVIEW:   I have reviewed records in [facility]    ASSESSMENT     Diagnoses and all orders for this visit:    1. Acute on chronic heart failure with preserved ejection fraction (HFpEF) (Primary)    2. Bilateral lower extremity edema    3. Non-compliance    4. Impaired mobility and ADLs          PLAN    Acute on chronic CHF/CRF/non compliance/BLE edema  -Acute CHF exacerbation much improved and nearly resolved. She will continue on Bumex daily. Labs are pending. Will follow up.    Will follow-up with results and continue to monitor.    Patient and nursing encouraged to keep me informed of any acute changes, lack of improvement, or any new concerning symptoms.     Continue supportive care for all ADLs.        [x]  Discussed Patient in detail with nursing/staff, addressed all needs today.     [x]  Plan of Care Reviewed   []  PT/OT Reviewed   [x]  Order Changes  []  Discharge Plans Reviewed  [x]  Advance Directive on file with Nursing Home.   [x]  POA on file with Nursing Home.   [x]  Code Status listed: []  Full Code   []  DNR      “I confirm accuracy of unchanged data/findings which have been carried forward from previous visit, as well as I have updated appropriately those that have changed.”     Myesha Ann, RAINA.

## 2023-11-07 NOTE — PROGRESS NOTES
Nursing Home Follow Up Note      Bean Ag DO []   RAINA Koehler [x]  [address] [place] [address]  Phone: [phone]  Fax: [phone] Radha Hopper MD []    Justino Weston DO []   [address] [place] [address]  Phone: [phone]  Fax: [phone]     PATIENT NAME: Breonna Gongora                                                                          YOB: 1935           DATE OF SERVICE: 11/06/2023  FACILITY:  [x][facility]   [] [facility]   [] [facility]   [] [facility]   [] Other ______________________________________________________________________      CHIEF COMPLAINT:    Follow up acute CHF exacerbation.       HISTORY OF PRESENT ILLNESS:     History of CHF with frequent exacerbation secondary to noncompliance with BiPAP.  Lasix was changed to Bumex last week.  Lower extremity edema much improved and she reports that she is much less short of breath as well.  Twice daily dose of Bumex will complete today.  She will continue on daily Bumex.  Labs obtained this morning and are pending.  No other complaints and reports she is doing well otherwise. Continues to be noncompliant with BiPAP.    PAST MEDICAL & SURGICAL HISTORY:   Past Medical History:   Diagnosis Date    Allergic     Anemia     CHF (congestive heart failure)     Depression     Hypothyroidism     Paroxysmal A-fib     Stroke       Past Surgical History:   Procedure Laterality Date    APPENDECTOMY      CATARACT EXTRACTION N/A     TOTAL ABDOMINAL HYSTERECTOMY WITH SALPINGO OOPHORECTOMY           MEDICATIONS:  I have reviewed and reconciled the patients medication list in the patients chart at the skilled nursing facility today.      ALLERGIES:    Allergies   Allergen Reactions    Penicillins Unknown - High Severity    Sulfa Antibiotics Unknown - High Severity         SOCIAL HISTORY:    Social History     Socioeconomic History    Marital status: Unknown   Tobacco Use    Smoking status: Never    Smokeless  tobacco: Never   Vaping Use    Vaping Use: Never used   Substance and Sexual Activity    Alcohol use: Never    Drug use: Never    Sexual activity: Defer       FAMILY HISTORY:    Family History   Problem Relation Age of Onset    COPD Mother     Heart disease Father     Alcohol abuse Father        REVIEW OF SYSTEMS:    Review of Systems   Constitutional:  Negative for activity change, appetite change, chills, diaphoresis, fatigue, fever, unexpected weight gain and unexpected weight loss.   HENT:  Negative for congestion, mouth sores, nosebleeds and trouble swallowing.    Respiratory:  Positive for apnea (sleep apnea). Negative for cough, choking, chest tightness, shortness of breath (much improved) and wheezing.    Cardiovascular:  Positive for leg swelling (improved). Negative for chest pain and palpitations.   Gastrointestinal:  Negative for abdominal distention, abdominal pain, blood in stool, constipation, diarrhea, nausea, vomiting and indigestion.   Genitourinary:  Positive for frequency (due to diuretic). Negative for decreased urine volume, difficulty urinating, dysuria, flank pain, hematuria, urgency and urinary incontinence.   Musculoskeletal:  Positive for arthralgias (chronic). Negative for back pain and myalgias.   Skin:  Negative for color change and rash.   Neurological:  Positive for weakness and memory problem. Negative for dizziness and confusion.   Psychiatric/Behavioral:  Negative for behavioral problems, dysphoric mood, sleep disturbance and depressed mood. The patient is not nervous/anxious.          PHYSICAL EXAMINATION:   VITAL SIGNS:   Vitals:    11/06/23 1146   BP: 116/60   Pulse: 64   Resp: 18   Temp: 98.1 °F (36.7 °C)   SpO2: 96%   Weight: 76.3 kg (168 lb 3.2 oz)        Physical Exam  Vitals reviewed.   Constitutional:       Appearance: Normal appearance.   Cardiovascular:      Rate and Rhythm: Normal rate and regular rhythm.      Comments: Trace edema BLE but much improved  Pulmonary:       Effort: Pulmonary effort is normal.      Breath sounds: Normal breath sounds. No wheezing, rhonchi or rales.   Musculoskeletal:      Right lower leg: Edema present.      Left lower leg: Edema present.   Neurological:      Mental Status: She is alert. Mental status is at baseline.   Psychiatric:         Mood and Affect: Mood and affect normal.         Speech: Speech normal.         Cognition and Memory: Memory is impaired.         RECORDS REVIEW:   I have reviewed records in [facility]    ASSESSMENT     Diagnoses and all orders for this visit:    1. Acute on chronic heart failure with preserved ejection fraction (HFpEF) (Primary)    2. Bilateral lower extremity edema    3. Non-compliance    4. Impaired mobility and ADLs          PLAN    Acute on chronic CHF/CRF/non compliance/BLE edema  -Acute CHF exacerbation much improved and nearly resolved. She will continue on Bumex daily. Labs are pending. Will follow up.    Will follow-up with results and continue to monitor.    Patient and nursing encouraged to keep me informed of any acute changes, lack of improvement, or any new concerning symptoms.     Continue supportive care for all ADLs.        [x]  Discussed Patient in detail with nursing/staff, addressed all needs today.     [x]  Plan of Care Reviewed   []  PT/OT Reviewed   [x]  Order Changes  []  Discharge Plans Reviewed  [x]  Advance Directive on file with Nursing Home.   [x]  POA on file with Nursing Home.   [x]  Code Status listed: []  Full Code   []  DNR      “I confirm accuracy of unchanged data/findings which have been carried forward from previous visit, as well as I have updated appropriately those that have changed.”     Myesha Ann, RAINA.

## 2023-11-27 PROBLEM — I50.9 CHRONIC CONGESTIVE HEART FAILURE: Status: ACTIVE | Noted: 2023-01-01

## 2023-11-27 NOTE — LETTER
Nursing Home Follow Up Note      Bean Ag DO []   RAINA Koehler [x]  [address] [place] [address]  Phone: [phone]  Fax: [phone] Radha Hopper MD []    Justino Weston DO []   [address] [place] [address]  Phone: [phone]  Fax: [phone]     PATIENT NAME: Breonna Gongora                                                                          YOB: 1935           DATE OF SERVICE: 11/27/2023  FACILITY:  [x][facility]   [] [facility]   [] [facility]   [] [facility]   [] Other ______________________________________________________________________      CHIEF COMPLAINT:    Coughing and wheezing for the past couple days.     Puffiness to face for past couple days.       HISTORY OF PRESENT ILLNESS:     Coughing and wheezing for the past couple days. Does not have any increased edema but does have some facial puffiness. History of CHF with frequent exacerbation secondary to noncompliance with BiPAP.  She is on Bumex 1 mg daily, and symptoms have been very well controlled for the past several weeks. ST is also working with her currently due to concerns for aspiration. Also has history of COPD. No increased shortness of breath, no hypoxia. No other complaints today, no signs and symptoms of any distress.     PAST MEDICAL & SURGICAL HISTORY:   Past Medical History:   Diagnosis Date    Allergic     Anemia     CHF (congestive heart failure)     Depression     Hypothyroidism     Paroxysmal A-fib     Stroke       Past Surgical History:   Procedure Laterality Date    APPENDECTOMY      CATARACT EXTRACTION N/A     TOTAL ABDOMINAL HYSTERECTOMY WITH SALPINGO OOPHORECTOMY           MEDICATIONS:  I have reviewed and reconciled the patients medication list in the patients chart at the skilled nursing facility today.      ALLERGIES:    Allergies   Allergen Reactions    Penicillins Unknown - High Severity    Sulfa Antibiotics Unknown - High Severity         SOCIAL  HISTORY:    Social History     Socioeconomic History    Marital status: Unknown   Tobacco Use    Smoking status: Never    Smokeless tobacco: Never   Vaping Use    Vaping Use: Never used   Substance and Sexual Activity    Alcohol use: Never    Drug use: Never    Sexual activity: Defer       FAMILY HISTORY:    Family History   Problem Relation Age of Onset    COPD Mother     Heart disease Father     Alcohol abuse Father        REVIEW OF SYSTEMS:    Review of Systems   Constitutional:  Negative for activity change, appetite change, chills, diaphoresis, fatigue, fever, unexpected weight gain and unexpected weight loss.   HENT:  Negative for congestion, mouth sores, nosebleeds, sore throat, swollen glands and trouble swallowing.    Respiratory:  Positive for apnea, cough, shortness of breath and wheezing. Negative for chest tightness.    Cardiovascular:  Positive for leg swelling. Negative for chest pain and palpitations.   Gastrointestinal:  Negative for abdominal distention, abdominal pain, blood in stool, constipation, diarrhea, nausea, vomiting and indigestion.   Genitourinary:  Negative for decreased urine volume, difficulty urinating, dysuria, flank pain, frequency, hematuria, urgency and urinary incontinence.   Musculoskeletal:  Positive for arthralgias (chronic). Negative for back pain and myalgias.   Skin:  Negative for color change and rash.   Neurological:  Positive for weakness and memory problem. Negative for dizziness and confusion.   Psychiatric/Behavioral:  Negative for behavioral problems, dysphoric mood, sleep disturbance and depressed mood. The patient is not nervous/anxious.          PHYSICAL EXAMINATION:   VITAL SIGNS:   Vitals:    11/27/23 1304   BP: 164/60   Pulse: 82   Resp: 18   Temp: 97.5 °F (36.4 °C)   SpO2: 97%   Weight: 72.8 kg (160 lb 6.4 oz)        Physical Exam  Vitals reviewed.   Constitutional:       Appearance: Normal appearance.   Cardiovascular:      Rate and Rhythm: Normal rate and  regular rhythm.   Pulmonary:      Effort: Pulmonary effort is normal.      Breath sounds: Examination of the right-upper field reveals rhonchi. Examination of the left-upper field reveals rhonchi. Examination of the right-lower field reveals decreased breath sounds. Examination of the left-lower field reveals decreased breath sounds. Decreased breath sounds, wheezing and rhonchi present. No rales.   Musculoskeletal:      Right lower leg: No edema.      Left lower leg: No edema.   Neurological:      Mental Status: She is alert. Mental status is at baseline.   Psychiatric:         Mood and Affect: Mood and affect normal.         Speech: Speech normal.         Cognition and Memory: Memory is impaired.         RECORDS REVIEW:   I have reviewed records in [facility]    ASSESSMENT     Diagnoses and all orders for this visit:    1. Chronic bronchitis, unspecified chronic bronchitis type    2. Chronic congestive heart failure, unspecified heart failure type    3. Acute cough    4. Wheezing    5. Impaired mobility and ADLs          PLAN    COPD/CHF/cough/wheezing  -CXR performed and reported small LLL effusion and possible pneumonia verses atelectasis but improved from previous xray. Will increase Bumex to bid x 3 days. Duo Nebs q 6 hours x 7 days. Guaifenesin 400 mg po tid x 7 days. Will continue to monitor.     Will follow-up and continue to monitor.    Patient and nursing encouraged to keep me informed of any acute changes, lack of improvement, or any new concerning symptoms.     Continue supportive care for all ADLs.        [x]  Discussed Patient in detail with nursing/staff, addressed all needs today.     [x]  Plan of Care Reviewed   []  PT/OT Reviewed   [x]  Order Changes  []  Discharge Plans Reviewed  [x]  Advance Directive on file with Nursing Home.   [x]  POA on file with Nursing Home.   [x]  Code Status listed: []  Full Code   []  DNR     “I confirm accuracy of unchanged data/findings which have been carried forward from  previous visit, as well as I have updated appropriately those that have changed.”     I spent 35 minutes caring for Breonna on this date of service. This time includes time spent by me in the following activities:preparing for the visit, reviewing tests, obtaining and/or reviewing a separately obtained history, performing a medically appropriate examination and/or evaluation , counseling and educating the patient/family/caregiver, ordering medications, tests, or procedures, referring and communicating with other health care professionals , documenting information in the medical record, independently interpreting results and communicating that information with the patient/family/caregiver, and care coordination       Myesha Ann, APRN.

## 2023-11-28 NOTE — PROGRESS NOTES
Nursing Home Follow Up Note      Bean Ag DO []   RAINA Koehler [x]  [address] [place] [address]  Phone: [phone]  Fax: [phone] Radha Hopper MD []    Justino Weston DO []   [address] [place] [address]  Phone: [phone]  Fax: [phone]     PATIENT NAME: Breonna Gongora                                                                          YOB: 1935           DATE OF SERVICE: 11/27/2023  FACILITY:  [x][place]   [] [facility]   [] [facility]   [] [facility]   [] Other ______________________________________________________________________      CHIEF COMPLAINT:    Coughing and wheezing for the past couple days.     Puffiness to face for past couple days.       HISTORY OF PRESENT ILLNESS:     Coughing and wheezing for the past couple days. Does not have any increased edema but does have some facial puffiness. History of CHF with frequent exacerbation secondary to noncompliance with BiPAP.  She is on Bumex 1 mg daily, and symptoms have been very well controlled for the past several weeks. ST is also working with her currently due to concerns for aspiration. Also has history of COPD. No increased shortness of breath, no hypoxia. No other complaints today, no signs and symptoms of any distress.     PAST MEDICAL & SURGICAL HISTORY:   Past Medical History:   Diagnosis Date    Allergic     Anemia     CHF (congestive heart failure)     Depression     Hypothyroidism     Paroxysmal A-fib     Stroke       Past Surgical History:   Procedure Laterality Date    APPENDECTOMY      CATARACT EXTRACTION N/A     TOTAL ABDOMINAL HYSTERECTOMY WITH SALPINGO OOPHORECTOMY           MEDICATIONS:  I have reviewed and reconciled the patients medication list in the patients chart at the skilled nursing facility today.      ALLERGIES:    Allergies   Allergen Reactions    Penicillins Unknown - High Severity    Sulfa Antibiotics Unknown - High Severity         SOCIAL  HISTORY:    Social History     Socioeconomic History    Marital status: Unknown   Tobacco Use    Smoking status: Never    Smokeless tobacco: Never   Vaping Use    Vaping Use: Never used   Substance and Sexual Activity    Alcohol use: Never    Drug use: Never    Sexual activity: Defer       FAMILY HISTORY:    Family History   Problem Relation Age of Onset    COPD Mother     Heart disease Father     Alcohol abuse Father        REVIEW OF SYSTEMS:    Review of Systems   Constitutional:  Negative for activity change, appetite change, chills, diaphoresis, fatigue, fever, unexpected weight gain and unexpected weight loss.   HENT:  Negative for congestion, mouth sores, nosebleeds, sore throat, swollen glands and trouble swallowing.    Respiratory:  Positive for apnea, cough, shortness of breath and wheezing. Negative for chest tightness.    Cardiovascular:  Positive for leg swelling. Negative for chest pain and palpitations.   Gastrointestinal:  Negative for abdominal distention, abdominal pain, blood in stool, constipation, diarrhea, nausea, vomiting and indigestion.   Genitourinary:  Negative for decreased urine volume, difficulty urinating, dysuria, flank pain, frequency, hematuria, urgency and urinary incontinence.   Musculoskeletal:  Positive for arthralgias (chronic). Negative for back pain and myalgias.   Skin:  Negative for color change and rash.   Neurological:  Positive for weakness and memory problem. Negative for dizziness and confusion.   Psychiatric/Behavioral:  Negative for behavioral problems, dysphoric mood, sleep disturbance and depressed mood. The patient is not nervous/anxious.          PHYSICAL EXAMINATION:   VITAL SIGNS:   Vitals:    11/27/23 1304   BP: 164/60   Pulse: 82   Resp: 18   Temp: 97.5 °F (36.4 °C)   SpO2: 97%   Weight: 72.8 kg (160 lb 6.4 oz)        Physical Exam  Vitals reviewed.   Constitutional:       Appearance: Normal appearance.   Cardiovascular:      Rate and Rhythm: Normal rate and  regular rhythm.   Pulmonary:      Effort: Pulmonary effort is normal.      Breath sounds: Examination of the right-upper field reveals rhonchi. Examination of the left-upper field reveals rhonchi. Examination of the right-lower field reveals decreased breath sounds. Examination of the left-lower field reveals decreased breath sounds. Decreased breath sounds, wheezing and rhonchi present. No rales.   Musculoskeletal:      Right lower leg: No edema.      Left lower leg: No edema.   Neurological:      Mental Status: She is alert. Mental status is at baseline.   Psychiatric:         Mood and Affect: Mood and affect normal.         Speech: Speech normal.         Cognition and Memory: Memory is impaired.         RECORDS REVIEW:   I have reviewed records in [facility]    ASSESSMENT     Diagnoses and all orders for this visit:    1. Chronic bronchitis, unspecified chronic bronchitis type    2. Chronic congestive heart failure, unspecified heart failure type    3. Acute cough    4. Wheezing    5. Impaired mobility and ADLs          PLAN    COPD/CHF/cough/wheezing  -CXR performed and reported small LLL effusion and possible pneumonia verses atelectasis but improved from previous xray. Will increase Bumex to bid x 3 days. Duo Nebs q 6 hours x 7 days. Guaifenesin 400 mg po tid x 7 days. Will continue to monitor.     Will follow-up and continue to monitor.    Patient and nursing encouraged to keep me informed of any acute changes, lack of improvement, or any new concerning symptoms.     Continue supportive care for all ADLs.        [x]  Discussed Patient in detail with nursing/staff, addressed all needs today.     [x]  Plan of Care Reviewed   []  PT/OT Reviewed   [x]  Order Changes  []  Discharge Plans Reviewed  [x]  Advance Directive on file with Nursing Home.   [x]  POA on file with Nursing Home.   [x]  Code Status listed: []  Full Code   []  DNR     “I confirm accuracy of unchanged data/findings which have been carried forward from  previous visit, as well as I have updated appropriately those that have changed.”     I spent 35 minutes caring for Breonna on this date of service. This time includes time spent by me in the following activities:preparing for the visit, reviewing tests, obtaining and/or reviewing a separately obtained history, performing a medically appropriate examination and/or evaluation , counseling and educating the patient/family/caregiver, ordering medications, tests, or procedures, referring and communicating with other health care professionals , documenting information in the medical record, independently interpreting results and communicating that information with the patient/family/caregiver, and care coordination       Myesha Ann, APRN.

## 2023-11-30 PROBLEM — J96.00 ACUTE RESPIRATORY FAILURE: Status: ACTIVE | Noted: 2023-01-01

## 2023-11-30 NOTE — DISCHARGE SUMMARY
[facility]      Name:  Breonna Gongora   Age:  88 y.o.  Sex:  female  :  1935  MRN:  [id number]   Visit Number:  [id number]    Admission Date:  2023  Date of Discharge:  2023  Primary Care Physician:  Provider, No Known    Important issues to note:    Patient  prior to admission examination.  Time of death noted by ED physician.    Discharge Diagnoses:     Acute respiratory failure with hypoxia and hypercapnia  Sepsis of unknown origin  Acute congestive heart failure exacerbation  RSV infection  Atrial Fibrillation with RVR    Problem List:     Active Hospital Problems    Diagnosis  POA    **Acute respiratory failure [J96.00]  Yes      Resolved Hospital Problems   No resolved problems to display.     Presenting Problem:    Chief Complaint   Patient presents with    Respiratory Distress      Consults:     Consulting Physician(s)                     None              History of presenting illness/Hospital Course:    Patient is an 88 year old chronically ill female who presents from local nursing home to the emergency department in respiratory distress.  Baseline oxygen use 2L.  Found by nursing staff at her facility in respiratory distress with hypoxia.  EMS placed patient on Bipap which was continued on arrival to the hospital.  Patient with past health history significant for paroxysmal atrial fibrillation, history of stroke, hypothyroidism, congestive heart failure and anemia.  Patient' daughter reports progressive decline recently and worsening CHF with diuretics recently adjusted to Bumex therapy.  Reports bedridden for the past year.     In the emergency department patient noted to have tachypnea and increased work of breathing as well as tachycardia.  Lab work was obtained and noted +RSV, elevated BNP>20,000, leukocytosis with WBC-28.  Patient was given lasix 80mg IV, Levaquin IV, and Morphine 2mg x2 doses.  After daughter arrived she  advised she wanted to honor her mother's wishes of DNR and proceed with hospice and comfort care.  There were no beds available at [facility], thus Hospitalist was contacted for admission.  Patient refused Bipap after a short time and that was removed per patient/ daughter wishes.  Upon going down to do a physical exam of the patient staff notified myself that the patient had  around shift change.  Time of death pronounced by ED physician prior to my exam.    Vital Signs:    Temp:  [98 °F (36.7 °C)] 98 °F (36.7 °C)  Heart Rate:  [] 104  Resp:  [38-50] 38  BP: (103-196)/() 103/61    Physical Exam:    Patient  at time of exam.  Time of death called by ED physician.    Pertinent Lab Results:     Results from last 7 days   Lab Units 23  0146   SODIUM mmol/L 137   POTASSIUM mmol/L 3.9   CHLORIDE mmol/L 94*   CO2 mmol/L 31.9*   BUN mg/dL 38*   CREATININE mg/dL 1.05*   CALCIUM mg/dL 9.7   BILIRUBIN mg/dL 0.2   ALK PHOS U/L 102   ALT (SGPT) U/L 12   AST (SGOT) U/L 23   GLUCOSE mg/dL 160*     Results from last 7 days   Lab Units 23  0146   WBC 10*3/mm3 28.74*   HEMOGLOBIN g/dL 11.9*   HEMATOCRIT % 38.2   PLATELETS 10*3/mm3 413         Results from last 7 days   Lab Units 23  0146   HSTROP T ng/L 39*     Results from last 7 days   Lab Units 23  0146   PROBNP pg/mL 16,726.0*             Results from last 7 days   Lab Units 23  0145   PH, ARTERIAL pH units 7.316*   PO2 ART mm Hg 93.9   PCO2, ARTERIAL mm Hg 68.3*   HCO3 ART mmol/L 34.9*           Pertinent Radiology Results:    Imaging Results (All)       Procedure Component Value Units Date/Time    XR Chest 1 View [[id number]] Collected: 23     Updated: 23    Narrative:      PROCEDURE: XR CHEST 1 VW-     HISTORY: SOA Triage Protocol     COMPARISON: 2023..     FINDINGS: The heart is upper limits of normal. Patient is rotated to the  left. There is bilateral interstitial disease  and bronchial wall  thickening slightly worsened from prior. There are small bilateral  pleural effusions and pulmonary vascular congestion suggesting mild  CHF.. . The mediastinum is unremarkable. There is no pneumothorax.   There are no acute osseous abnormalities.       Impression:      Findings suggesting mild CHF, recommend follow-up..                 This report was signed and finalized on 2023 8:47 AM by Deyanira العراقي MD.               Echo:    Results for orders placed during the hospital encounter of 23    Adult Transthoracic Echo Complete With Contrast if Necessary Per Protocol    Interpretation Summary    Left ventricular ejection fraction appears to be 66 - 70%.    Left ventricular wall thickness is consistent with moderate concentric hypertrophy.    Left ventricular diastolic function is consistent with (grade III w/high LAP) reversible restrictive pattern.    The right ventricular cavity is mild to moderately dilated.    The right atrial cavity is borderline dilated.    There is moderate calcification of the aortic valve mainly affecting the non-coronary, left coronary and right coronary cusp(s).    Estimated right ventricular systolic pressure from tricuspid regurgitation is markedly elevated (>55 mmHg).    Moderate to severe pulmonary hypertension is present.    There is a small (<1cm) pericardial effusion adjacent to the right atrium. There is no evidence of cardiac tamponade.    Condition on Discharge:      Stable.    Code status during the hospital stay:    Code Status and Medical Interventions:   Ordered at: 23 0811     Level Of Support Discussed With:    Next of Kin (If No Surrogate)     Code Status (Patient has no pulse and is not breathing):    No CPR (Do Not Attempt to Resuscitate)     Medical Interventions (Patient has pulse or is breathing):    Comfort Measures     Discharge Disposition:        Discharge Medications:       Discharge Medications        ASK your  doctor about these medications        Instructions Start Date   amLODIPine 10 MG tablet  Commonly known as: NORVASC   10 mg, Oral, Every 24 Hours Scheduled      apixaban 5 MG tablet tablet  Commonly known as: ELIQUIS   5 mg, Oral, 2 Times Daily      chlordiazePOXIDE 5 MG capsule  Commonly known as: LIBRIUM   5 mg, Oral, 2 Times Daily      docusate sodium 100 MG capsule  Commonly known as: COLACE   100 mg, Oral, Nightly      fluticasone 50 MCG/ACT nasal spray  Commonly known as: FLONASE   1 spray, Nasal, Daily      furosemide 40 MG tablet  Commonly known as: Lasix   40 mg, Oral, Daily      insulin detemir 100 UNIT/ML injection  Commonly known as: LEVEMIR   20 Units, Subcutaneous, Nightly      ipratropium-albuterol 0.5-2.5 mg/3 ml nebulizer  Commonly known as: DUO-NEB   3 mL, Nebulization, Every 6 Hours PRN      isosorbide mononitrate 30 MG 24 hr tablet  Commonly known as: IMDUR   60 mg, Oral, Daily      levothyroxine 75 MCG tablet  Commonly known as: SYNTHROID, LEVOTHROID   75 mcg, Oral, Daily      lisinopril 20 MG tablet  Commonly known as: PRINIVIL,ZESTRIL   20 mg, Oral, Daily      loratadine 10 MG tablet  Commonly known as: CLARITIN   10 mg, Oral, Daily      LORazepam 0.5 MG tablet  Commonly known as: ATIVAN   0.5 mg, Oral, Every 6 Hours PRN      LORazepam 1 MG tablet  Commonly known as: Ativan   1 mg, Oral, Nightly      magnesium oxide 400 MG tablet  Commonly known as: MAG-OX   400 mg, Oral, 2 Times Weekly, TUES AND FRID      Melatonin 10 MG tablet   10 mg, Oral, Nightly      metoprolol succinate XL 50 MG 24 hr tablet  Commonly known as: TOPROL-XL   50 mg, Oral, Daily      polyethylene glycol 17 GM/SCOOP powder  Commonly known as: MIRALAX   17 g, Oral, 2 Times Daily                 No future appointments.  Test Results Pending at Discharge:    Pending Labs       Order Current Status    Blood Culture - Blood, Hand, Left In process    Blood Culture - Blood, Hand, Left In process               Myesha Pérez  RAINA  11/30/23  10:40 EST    Time: I spent 25 minutes on this discharge activity which included: face-to-face encounter with the patient, reviewing the data in the system, coordination of the care with the nursing staff as well as consultants, documentation, and entering orders.     Dictated utilizing Dragon dictation.

## 2023-11-30 NOTE — H&P
[facility]   HISTORY AND PHYSICAL      Name:  Breonna Gongora   Age:  88 y.o.  Sex:  female  :  1935  MRN:  [id number]   Visit Number:  [id number]  Admission Date:  2023  Date Of Service:  23  Primary Care Physician:  Provider, No Known    Chief Complaint:     Respiratory Distress    History Of Presenting Illness:      Patient is an 88 year old chronically ill female who presents from local nursing home to the emergency department in respiratory distress.  Baseline oxygen use 2L.  Found by nursing staff at her facility in respiratory distress with hypoxia.  EMS placed patient on Bipap which was continued on arrival to the hospital.  Patient with past health history significant for paroxysmal atrial fibrillation, history of stroke, hypothyroidism, congestive heart failure and anemia.  Patient' daughter reports progressive decline recently and worsening CHF with diuretics recently adjusted to Bumex therapy.  Reports bedridden for the past year.    In the emergency department patient noted to have tachypnea and increased work of breathing as well as tachycardia.  Lab work was obtained and noted +RSV, elevated BNP>20,000, leukocytosis with WBC-28.  Patient was given lasix 80mg IV, Levaquin IV, and Morphine 2mg x2 doses.  After daughter arrived she advised she wanted to honor her mother's wishes of DNR and proceed with hospice and comfort care.  There were no beds available at [facility], thus Hospitalist was contacted for admission.  Patient refused Bipap after a short time and that was removed per patient/ daughter wishes.  Upon going down to do a physical exam of the patient staff notified myself that the patient had  around shift change.    Review Of Systems:    All systems were reviewed and negative except as mentioned in history of presenting illness, assessment and plan.    Past Medical History: Patient  has a past medical history of Allergic,  Anemia, CHF (congestive heart failure), Depression, Hypothyroidism, Paroxysmal A-fib, and Stroke.    Past Surgical History: Patient  has a past surgical history that includes Total abdominal hysterectomy w/ bilateral salpingoophorectomy; Appendectomy; and Cataract extraction (N/A).    Social History: Patient  reports that she has never smoked. She has never used smokeless tobacco. She reports that she does not drink alcohol and does not use drugs.    Family History:  Patient's family history has been reviewed and found to be noncontributory.     Allergies:      Penicillins and Sulfa antibiotics    Home Medications:    Prior to Admission Medications       Prescriptions Last Dose Informant Patient Reported? Taking?    amLODIPine (NORVASC) 10 MG tablet  Nursing Home No No    Take 1 tablet by mouth Daily.    Patient taking differently:  Take 1 tablet by mouth Daily.    apixaban (ELIQUIS) 5 MG tablet tablet  Nursing Home Yes No    Take 1 tablet by mouth 2 (Two) Times a Day.    chlordiazePOXIDE (LIBRIUM) 5 MG capsule   No No    Take 1 capsule by mouth 2 (Two) Times a Day.    docusate sodium (COLACE) 100 MG capsule  Nursing Home Yes No    Take 1 capsule by mouth Every Night.    fluticasone (FLONASE) 50 MCG/ACT nasal spray  Nursing Home Yes No    1 spray into the nostril(s) as directed by provider Daily.    furosemide (Lasix) 40 MG tablet   No No    Take 1 tablet by mouth Daily for 30 days.    insulin detemir (LEVEMIR) 100 UNIT/ML injection  Nursing Home No No    Inject 20 Units under the skin into the appropriate area as directed Every Night.    ipratropium-albuterol (DUO-NEB) 0.5-2.5 mg/3 ml nebulizer   No No    Take 3 mL by nebulization Every 6 (Six) Hours As Needed for Wheezing.    isosorbide mononitrate (IMDUR) 30 MG 24 hr tablet  Nursing Home Yes No    Take 2 tablets by mouth Daily.    levothyroxine (SYNTHROID, LEVOTHROID) 75 MCG tablet  Nursing Home Yes No    Take 1 tablet by mouth Daily.    lisinopril  "(PRINIVIL,ZESTRIL) 20 MG tablet  Nursing Home Yes No    Take 1 tablet by mouth Daily.    loratadine (CLARITIN) 10 MG tablet  Nursing Home Yes No    Take 1 tablet by mouth Daily.    LORazepam (ATIVAN) 0.5 MG tablet   No No    Take 1 tablet by mouth Every 6 (Six) Hours As Needed for Anxiety.    LORazepam (Ativan) 1 MG tablet   No No    Take 1 tablet by mouth Every Night.    magnesium oxide (MAG-OX) 400 MG tablet  Nursing Home Yes No    Take 1 tablet by mouth 2 (Two) Times a Week. TUES AND FRID    Melatonin 10 MG tablet  Nursing Home Yes No    Take 1 tablet by mouth Every Night.    metoprolol succinate XL (TOPROL-XL) 50 MG 24 hr tablet  Nursing Home Yes No    Take 1 tablet by mouth Daily.    polyethylene glycol (MIRALAX) 17 GM/SCOOP powder  Nursing Home Yes No    Take 17 g by mouth 2 (Two) Times a Day.          ED Medications:    Medications   sodium chloride 0.9 % flush 10 mL (has no administration in time range)   levoFLOXacin (LEVAQUIN) 750 mg/150 mL D5W (premix) (LEVAQUIN) 750 mg (0 mg Intravenous Stopped 23 0431)   Morphine sulfate (PF) injection 2 mg (2 mg Intravenous Given 23 0323)   furosemide (LASIX) injection 80 mg (80 mg Intravenous Given 23 0323)   Morphine sulfate (PF) injection 2 mg (2 mg Intravenous Given 23 0646)     Vital Signs:  Temp:  [98 °F (36.7 °C)] 98 °F (36.7 °C)  Heart Rate:  [] 104  Resp:  [38-50] 38  BP: (103-196)/() 103/61        23  0138   Weight: 72.6 kg (160 lb)     Body mass index is 31.25 kg/m².    Physical Exam:     Most recent vital Signs: /61   Pulse 104   Temp 98 °F (36.7 °C) (Axillary)   Resp (!) 38   Ht 152.4 cm (60\")   Wt 72.6 kg (160 lb)   SpO2 92%   BMI 31.25 kg/m²     Physical Exam  Vitals and nursing note reviewed.       Upon going to admit patient was notified that patient had .      Laboratory data:    I have reviewed the labs done in the emergency room.    Results from last 7 days   Lab Units 23  0146 " "  SODIUM mmol/L 137   POTASSIUM mmol/L 3.9   CHLORIDE mmol/L 94*   CO2 mmol/L 31.9*   BUN mg/dL 38*   CREATININE mg/dL 1.05*   CALCIUM mg/dL 9.7   BILIRUBIN mg/dL 0.2   ALK PHOS U/L 102   ALT (SGPT) U/L 12   AST (SGOT) U/L 23   GLUCOSE mg/dL 160*     Results from last 7 days   Lab Units 23  0146   WBC 10*3/mm3 28.74*   HEMOGLOBIN g/dL 11.9*   HEMATOCRIT % 38.2   PLATELETS 10*3/mm3 413         Results from last 7 days   Lab Units 23  0146   HSTROP T ng/L 39*     Results from last 7 days   Lab Units 23  014   PROBNP pg/mL 16,726.0*             Results from last 7 days   Lab Units 23  0145   PH, ARTERIAL pH units 7.316*   PO2 ART mm Hg 93.9   PCO2, ARTERIAL mm Hg 68.3*   HCO3 ART mmol/L 34.9*           Invalid input(s): \"USDES\", \"NITRITITE\", \"BACT\", \"EP\"    Pain Management Panel           No data to display                EKG:      Atrial Fibrillation with RVR with rate of 106bpm--Abnormal EKG    Radiology:    No radiology results for the last 3 days    Assessment:    Acute respiratory failure with hypoxia and hypercapnia  Sepsis 2/2 to #1  Acute congestive heart failure exacerbation  RSV infection  Atrial Fibrillation with RVR    Plan:    -Initially had planned for comfort measures, however, patient  prior to my examination.    Risk Assessment: High  DVT Prophylaxis: Not indicated  Code Status: DNR--comfort measures  Diet: As tolerated    Advance Care Planning   ACP discussion was held with the patient during this visit. Patient has an advance directive in EMR which is still valid.        Myesha Pérez, APRN  23  08:13 EST    Dictated utilizing Dragon dictation.  "

## 2023-11-30 NOTE — PROGRESS NOTES
Enter Query Response Below      Query Response:     Acute on chronic respiratory failure with hypoxia     Electronically signed by Myesha Pérez, APRN, 23, 12:34 PM EST.               If applicable, please update the problem list.       Patient: Breonna Gongora        : 1935  Account: [id number]           Admit Date:         How to Respond to this query:       a. Click New Note     b. Answer query within the yellow box.                c. Update the Problem List, if applicable.      If you have any questions about this query contact me at: [email or web address]     Myesha B Beto APRN,     88 year old female admitted with sepsis, diagnosed with acute respiratory failure with hypoxia and hypercapnia. H&P and discharge summary document baseline oxygen use of 2 LPM.     Treatment: Bipap in ED for a short time then patient refused to wear      Please specify the baseline oxygen (dependence/continuous use) as:    Chronic respiratory failure with hypoxia  Chronic respiratory failure with hypercapnia  Chronic respiratory failure   Other- specify ___________  Unable to clinically determine      By submitting this query, we are merely seeking further clarification of documentation to accurately reflect all conditions that you are monitoring, evaluating, treating or that extend the hospitalization or utilize additional resources of care. Please utilize your independent clinical judgment when addressing the question(s) above.     This query and your response, once completed, will be entered into the legal medical record.    Sincerely,  Sangita Macedo RN CCDS  Clinical Documentation Integrity Program

## 2023-11-30 NOTE — ED PROVIDER NOTES
Subjective   History of Present Illness  88-year-old female presents to the ED from her nursing home for chief complaint of respiratory distress.  Patient apparently normally wears 2 L nasal cannula.  Nursing staff noted that she was having increased work of breathing and was hypoxic and called EMS.  EMS placed the patient on BiPAP.  Patient presents to the ED still on BiPAP.    Review of Systems   Unable to perform ROS: Severe respiratory distress   Respiratory:  Positive for shortness of breath.    All other systems reviewed and are negative.      Past Medical History:   Diagnosis Date    Allergic     Anemia     CHF (congestive heart failure)     Depression     Hypothyroidism     Paroxysmal A-fib     Stroke        Allergies   Allergen Reactions    Penicillins Unknown - High Severity    Sulfa Antibiotics Unknown - High Severity       Past Surgical History:   Procedure Laterality Date    APPENDECTOMY      CATARACT EXTRACTION N/A     TOTAL ABDOMINAL HYSTERECTOMY WITH SALPINGO OOPHORECTOMY         Family History   Problem Relation Age of Onset    COPD Mother     Heart disease Father     Alcohol abuse Father        Social History     Socioeconomic History    Marital status: Unknown   Tobacco Use    Smoking status: Never    Smokeless tobacco: Never   Vaping Use    Vaping Use: Never used   Substance and Sexual Activity    Alcohol use: Never    Drug use: Never    Sexual activity: Defer           Objective   Physical Exam  Vitals and nursing note reviewed.   Constitutional:       General: She is in acute distress.      Appearance: She is ill-appearing.      Comments: Chronically ill-appearing.  Acutely ill.   HENT:      Head: Normocephalic and atraumatic.   Cardiovascular:      Rate and Rhythm: Tachycardia present. Rhythm irregular.      Pulses: Normal pulses.   Pulmonary:      Effort: Respiratory distress present.      Breath sounds: Wheezing and rhonchi present.   Abdominal:      Palpations: Abdomen is soft.       Tenderness: There is no abdominal tenderness.   Musculoskeletal:      Right lower leg: Edema present.      Left lower leg: Edema present.         Procedures           ED Course  ED Course as of 11/30/23 0642   Thu Nov 30, 2023   0158 EKG interpreted by me.  Atrial fibrillation with rapid ventricular response.  Rate of 106.  Nonspecific ST segment depressions.  Abnormal EKG [CG]      ED Course User Index  [CG] Cedrick Wilkerson,                                              Medical Decision Making  Problems Addressed:  Acute congestive heart failure, unspecified heart failure type: complicated acute illness or injury  Acute respiratory failure with hypoxia and hypercapnia: complicated acute illness or injury  Atrial fibrillation with RVR: complicated acute illness or injury  RSV (respiratory syncytial virus infection): complicated acute illness or injury  Sepsis with acute organ dysfunction, due to unspecified organism, unspecified organ dysfunction type, unspecified whether septic shock present: complicated acute illness or injury    Amount and/or Complexity of Data Reviewed  Labs: ordered.  Radiology: ordered.  ECG/medicine tests: ordered.    Risk  Prescription drug management.  Decision regarding hospitalization.    Chief complaint: Respiratory distress    Differential diagnosis includes but not limited to: Atrial fibrillation with RVR, acute CHF exacerbation, aspiration pneumonia, pneumonia, acute congestive heart failure, other    Patient arrives stable, afebrile, without respiratory distress with initial vitals interpreted by myself.  Pertinent initial vitals include tachypneic respiratory rate of 50, hypoxic, hypertensive    Plan: Chest x-ray, CBC, CMP, EKG, proBNP, troponin, ABG, procalcitonin, lactic acid, other    Results from initial plan were reviewed and interpreted by myself and include: Chest x-ray shows bilateral pulmonary edema, lactic elevated, proBNP greater than 16,000, RSV positive, white blood  cell count significantly evaded 28,000, CMP shows glucose 160 BUN 38 creatinine 1.05    Consultations Dr. Cabrera, accepts for admission.  Hospice,no beds available at [facility]. Will consult on patient.     Reevaluation refusing BiPAP.    Discussion: Ill-appearing acutely distressed 88-year-old female presents to the ED in respiratory distress.  Patient is on BiPAP.  Work-up most consistent with bronchiolitis, likely aspiration pneumonia, sepsis, A-fib with RVR and acute CHF exacerbation with pulmonary edema.  Patient is actively DNR.  She is refusing to wear BiPAP.  Still breathing severely tachypneic.  Patient's daughter arrived and was at bedside wish to take the patient off of BiPAP.  They wish to speak with hospice and/or go to the [facility].  There were no beds available at the [facility].  Family wishes to make the patient comfort measures only.  We will admit to the hospitalist.  With palliative care and hospice consult.    Diagnostic information from other sources includes: Review of previous visits, prior labs, prior imaging, available notes from prior evaluations or visits with specialists, medication list, allergies, past medical history, past surgical history    Interventions in the ED included: Lasix, BiPAP, supplemental oxygen, other    Disposition plan: Admission.  Palliative care/end-of-life management.    Critical Care  Performed by: Cedrick Wilkerson DO  Authorized by: Cedrick Wilkerson DO     Critical care provider statement:     Critical care time (minutes): 35    Critical care time was exclusive of:  Separately billable procedures and treating other patients    Critical care was necessary to treat or prevent imminent or life-threatening deterioration of the following conditions: Sepsis, aspiration pneumonia, atrial fibrillation with RVR, acute congestive heart failure, acute respiratory failure, other    Critical care was time spent personally by me  on the following activities:  Ordering and performing treatments and interventions, development of treatment plan with patient or surrogate, discussions with consultants, evaluation of patient's response to treatment, examination of patient, ordering and review of laboratory studies, ordering and review of radiographic studies, pulse oximetry, re-evaluation of patient's condition and review of old charts        Final diagnoses:   Acute respiratory failure with hypoxia and hypercapnia   Sepsis with acute organ dysfunction, due to unspecified organism, unspecified organ dysfunction type, unspecified whether septic shock present   Acute congestive heart failure, unspecified heart failure type   Atrial fibrillation with RVR   RSV (respiratory syncytial virus infection)       ED Disposition  ED Disposition       ED Disposition   Decision to Admit    Condition   --    Comment   Level of Care: Palliative Care [18]   Diagnosis: Acute respiratory failure [518.81.ICD-9-CM]   Admitting Physician: BEA ONEAL [[id number]]   Attending Physician: BEA ONEAL [[id number]]   Certification: I Certify That Inpatient Hospital Services Are Medically Necessary For Greater Than 2 Midnights                 No follow-up provider specified.       Medication List      No changes were made to your prescriptions during this visit.            Cedrick Wilkerson, DO  11/30/23 0642

## 2023-11-30 NOTE — CASE MANAGEMENT/SOCIAL WORK
Case Management Discharge Note                Selected Continued Care - Admitted Since 2023       Destination    No services have been selected for the patient.                Durable Medical Equipment    No services have been selected for the patient.                Dialysis/Infusion    No services have been selected for the patient.                Home Medical Care    No services have been selected for the patient.                Therapy    No services have been selected for the patient.                [facility]    No services have been selected for the patient.                [facility]    No services have been selected for the patient.                         Final Discharge Disposition Code: 20 -

## 2023-11-30 NOTE — PROGRESS NOTES
Enter Query Response Below      Query Response:         Yes, all secondary to sepsis  Electronically signed by Myesha Pérez, APRN, 23, 12:33 PM EST.         If applicable, please update the problem list.       Patient: Breonna Gongora        : 1935  Account: [id number]           Admit Date:         How to Respond to this query:       a. Click New Note     b. Answer query within the yellow box.                c. Update the Problem List, if applicable.      If you have any questions about this query contact me at: [email or web address]     Myesha Pérez APRN,    88 year old female admitted with sepsis of unknown origin and RSV infection. The patient was diagnosed with acute respiratory failure with hypoxia and hypercapnia. Lactic acid elevated at 3.0.     Treatment: Bipap- removed in ED per pt refusal to wear, IV Levaquin x 1 dose    Are any of this patient’s organ dysfunctions (acute hypoxic and hypercapnic respiratory failure, elevated lactic acid) secondary to sepsis?     Yes, all secondary to sepsis   Yes, but not all, please specify____________   No   Other- specify____________   Unable to determine        By submitting this query, we are merely seeking further clarification of documentation to accurately reflect all conditions that you are monitoring, evaluating, treating or that extend the hospitalization or utilize additional resources of care. Please utilize your independent clinical judgment when addressing the question(s) above.     This query and your response, once completed, will be entered into the legal medical record.    Sincerely,  Sangita Macedo RN CCDS  Clinical Documentation Integrity Program

## 2023-11-30 NOTE — Clinical Note
Level of Care: Palliative Care [18]   Diagnosis: Acute respiratory failure [518.81.ICD-9-CM]   Admitting Physician: BEA ONEAL [[id number]]   Attending Physician: BEA ONEAL [[id number]]   Certification: I Certify That Inpatient Hospital Services Are Medically Necessary For Greater Than 2 Midnights

## 2023-11-30 NOTE — PROGRESS NOTES
Enter Query Response Below      Query Response:     Acute on chronic diastolic heart failure/HFpEF    Electronically signed by Myesha DIONI Pérez, APRN, 23, 12:32 PM EST.           If applicable, please update the problem list.       Patient: Breonna Gongora        : 1935  Account: [id number]           Admit Date:         How to Respond to this query:       a. Click New Note     b. Answer query within the yellow box.                c. Update the Problem List, if applicable.      If you have any questions about this query contact me at: [email or web address]     Myesha Pérez APRN,    88 year old female admitted with sepsis  was diagnosed with acute exacerbation of CHF. Echocardiogram 23 reported ejection fraction 66-70%. The patient received IV lasix 80mg x1 in ED.    Please clarify the type of heart failure treated/monitored:    Acute on chronic diastolic heart failure/HFpEF  Acute diastolic CHF/HFpEF  Other- specify_________  Unable to determine      By submitting this query, we are merely seeking further clarification of documentation to accurately reflect all conditions that you are monitoring, evaluating, treating or that extend the hospitalization or utilize additional resources of care. Please utilize your independent clinical judgment when addressing the question(s) above.     This query and your response, once completed, will be entered into the legal medical record.    Sincerely,  Sangita Macedo RN CCDS  Clinical Documentation Integrity Program

## 2023-11-30 NOTE — SIGNIFICANT NOTE
Palliative Care Team consulted to regarding patient who presented to [facility] with respiratory distress with associated hypoxia.  Upon ED evaluation, patient initially on BiPAP however removed this and refused use.  ED evaluation noted elevated lactic, RSV positive, leukocytosis, BUN 38, Creatinine 1.05.  Per chart review, family requested comfort focused care, requesting no further use of BiPAP.  Initially requesting transfer to the [facility], however no beds available.  Primary medicine team consulted for admission to continue comfort care with palliative care inpatient consult.  Patient provided with comfort medications regarding symptom control.  After speaking directly with the primary medical team, proceeded to bedside to meet with patient/family.  Upon arrival to ED, notified by nursing that patient had been pronounced .  Palliative care met with daughter at bedside to offer condolences and provide support.  Daughter appreciative of palliative care team presence, no additional questions or concerns expressed.

## 2023-11-30 NOTE — NURSING NOTE
Patient  at time of meeting. TOD 0805 called by . Palliative care team provided emotional support for family.

## 2023-12-01 NOTE — PROGRESS NOTES
Enter Query Response Below      Query Response:     RSV infection unspecified    Electronically signed by Myesha B Beto, APRN, 23, 6:40 AM EST.               If applicable, please update the problem list.       Patient: Breonna Gongora        : 1935  Account: [id number]           Admit Date: 2023        How to Respond to this query:       a. Click New Note     b. Answer query within the yellow box.                c. Update the Problem List, if applicable.      If you have any questions about this query contact me at: [email or web address]     Myesha DIONI Pérez APRN,    88 year old female admitted  with sepsis and RSV infection.  Chest x-ray showed bilateral interstitial disease and bronchial wall thickening, small bilateral pleural effusions. The patient received IV Levaquin x1 dose before expiring.     Please clarify RSV infection as:    RSV pneumonia  RSV bronchitis  RSV infection unspecified  Other, please specify__________  Unable to clinically determine    By submitting this query, we are merely seeking further clarification of documentation to accurately reflect all conditions that you are monitoring, evaluating, treating or that extend the hospitalization or utilize additional resources of care. Please utilize your independent clinical judgment when addressing the question(s) above.     This query and your response, once completed, will be entered into the legal medical record.    Sincerely,  Sangita Macedo RN CCDS  Clinical Documentation Integrity Program

## 2023-12-05 LAB
BACTERIA SPEC AEROBE CULT: NORMAL
BACTERIA SPEC AEROBE CULT: NORMAL